# Patient Record
Sex: FEMALE | Race: WHITE | Employment: OTHER | URBAN - METROPOLITAN AREA
[De-identification: names, ages, dates, MRNs, and addresses within clinical notes are randomized per-mention and may not be internally consistent; named-entity substitution may affect disease eponyms.]

---

## 2017-01-08 DIAGNOSIS — E78.2 MIXED HYPERLIPIDEMIA: ICD-10-CM

## 2017-01-08 RX ORDER — ROSUVASTATIN CALCIUM 20 MG/1
TABLET, COATED ORAL
Qty: 90 TAB | Refills: 1 | Status: SHIPPED | OUTPATIENT
Start: 2017-01-08 | End: 2017-07-07 | Stop reason: SDUPTHER

## 2017-03-08 ENCOUNTER — OFFICE VISIT (OUTPATIENT)
Dept: INTERNAL MEDICINE CLINIC | Age: 67
End: 2017-03-08

## 2017-03-08 VITALS
WEIGHT: 143.6 LBS | RESPIRATION RATE: 16 BRPM | SYSTOLIC BLOOD PRESSURE: 140 MMHG | TEMPERATURE: 99.1 F | DIASTOLIC BLOOD PRESSURE: 78 MMHG | BODY MASS INDEX: 23.08 KG/M2 | HEIGHT: 66 IN | OXYGEN SATURATION: 98 % | HEART RATE: 77 BPM

## 2017-03-08 DIAGNOSIS — Z87.891 FORMER TOBACCO USE: ICD-10-CM

## 2017-03-08 DIAGNOSIS — E55.9 VITAMIN D DEFICIENCY: ICD-10-CM

## 2017-03-08 DIAGNOSIS — Z23 ENCOUNTER FOR IMMUNIZATION: ICD-10-CM

## 2017-03-08 DIAGNOSIS — I10 BENIGN ESSENTIAL HYPERTENSION: Primary | ICD-10-CM

## 2017-03-08 DIAGNOSIS — Z79.899 ENCOUNTER FOR LONG-TERM (CURRENT) USE OF MEDICATIONS: ICD-10-CM

## 2017-03-08 DIAGNOSIS — L50.9 HIVES: ICD-10-CM

## 2017-03-08 DIAGNOSIS — E78.00 PURE HYPERCHOLESTEROLEMIA: ICD-10-CM

## 2017-03-08 DIAGNOSIS — M85.80 OSTEOPENIA: ICD-10-CM

## 2017-03-08 RX ORDER — BUPROPION HYDROCHLORIDE 150 MG/1
TABLET ORAL
Qty: 90 TAB | Refills: 1 | Status: SHIPPED | OUTPATIENT
Start: 2017-03-08 | End: 2017-09-26

## 2017-03-08 NOTE — PROGRESS NOTES
Subjective:      Annmarie Mckay is a 74 y.o. female who presents today for HTN and HLD follow up. She monitors her BP at home occasionally and it runs around today's PeerJ. Exercise: walking 3 miles 3-4x/week. She is very conscious about her diet. Reports adherence with all medications. She still tobacco free! Denies HA, dizziness, chest pain, SOB, peripheral edema. She notes getting hives q6 weeks x 2 years. It usually happens in the middle of the night. She takes an old script of Atarax or Benadryl. She has not been able to identify a trigger. She inquires about starting Xolair. Current Outpatient Prescriptions   Medication Sig Dispense Refill    buPROPion XL (WELLBUTRIN XL) 150 mg tablet TAKE 1 TABLET BY MOUTH EVERY MORNING 90 Tab 1    rosuvastatin (CRESTOR) 20 mg tablet TAKE 1 TAB BY MOUTH NIGHTLY. 90 Tab 1    losartan (COZAAR) 50 mg tablet TAKE 1 TABLET BY MOUTH EVERY DAY 90 Tab 1    amLODIPine (NORVASC) 5 mg tablet TAKE 1 TABLET BY MOUTH DAILY 30 Tab 6    omega-3 fatty acids-vitamin e (FISH OIL) 1,000 mg cap Take 1 Cap by mouth two (2) times a day.  loratadine (CLARITIN) 10 mg tablet Take 1 Tab by mouth daily. (Patient taking differently: Take 10 mg by mouth daily as needed.) 1 Tab 0    triamcinolone (NASACORT AQ) 55 mcg nasal inhaler 2 Sprays by Both Nostrils route daily as needed.  calcium-cholecalciferol, D3, (CALTRATE 600+D) tablet Take 1 Tab by mouth two (2) times a day.  aspirin delayed-release 81 mg tablet Take  by mouth daily.  albuterol (PROVENTIL HFA, VENTOLIN HFA) 90 mcg/actuation inhaler Take 1 Puff by inhalation every six (6) hours as needed for Wheezing.  1 Inhaler 0     Allergies   Allergen Reactions    Ace Inhibitors Cough    Lipitor [Atorvastatin] Other (comments)     Elevated CK     Past Medical History:   Diagnosis Date    Basal cell carcinoma of skin     Colon polyps     Elevated CK 3/25/15    mild    Heart murmur 4/7/16    echo showed trivial MR and trivial TR    Hemorrhoids     per Dr. 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 Pitt Comes Osteopenia 7/2013    Pneumonia 3/21/14    Tobacco dependence     quit 3/19/14, restarted 5/2014        Review of Systems    Pertinent items are noted in HPI. Objective:     Visit Vitals    /78    Pulse 77    Temp 99.1 °F (37.3 °C) (Oral)    Resp 16    Ht 5' 6\" (1.676 m)    Wt 143 lb 9.6 oz (65.1 kg)    SpO2 98%    BMI 23.18 kg/m2     General appearance: alert, cooperative, no distress, appears stated age, very pleasant white female  Neck: supple, symmetrical, trachea midline, no adenopathy   Lungs: clear to auscultation bilaterally  Heart: regular rate and rhythm, S1, S2 normal, 2/6 EVIN heard throughout precordium, radiating to both carotids  Abdomen: soft, non-tender. Bowel sounds normal. No masses,  no organomegaly  Extremities: no edema  Skin: Skin color, texture normal. No rashes or lesions  Assessment/Plan:   Milka Norman was seen today for hypertension and cholesterol problem. Diagnoses and all orders for this visit:    Benign essential hypertension - stable. -     CBC W/O DIFF    Pure hypercholesterolemia  -     METABOLIC PANEL, COMPREHENSIVE  -     LIPID PANEL    Former tobacco use    Encounter for long-term (current) use of medications  -     buPROPion XL (WELLBUTRIN XL) 150 mg tablet; TAKE 1 TABLET BY MOUTH EVERY MORNING    Hives  -     REFERRAL TO ALLERGY    Encounter for immunization  -     pneumococcal 13 yana conj dip (PREVNAR 13, PF,) 0.5 mL syrg injection; 0.5 mL by IntraMUSCular route once for 1 dose. PLEASE FAX RECEIPT OF ADMINISTRATION TO OFFICE ([phone])    Osteopenia - repeat DEXA due this September. -     VITAMIN D, 25 HYDROXY    Vitamin D deficiency  -     VITAMIN D, 25 HYDROXY    Referred to [facility] for well woman exam that will be due this summer.     Follow-up Disposition:  Return in about 6 months (around 9/8/2017) for re-establish care with Dr. Thom Simms. Advised her to call back or return to office if symptoms worsen/change/persist.  Discussed expected course/resolution/complications of diagnosis in detail with patient. Medication risks/benefits/costs/interactions/alternatives discussed with patient. She was given an after visit summary which includes diagnoses, current medications, & vitals. She expressed understanding with the diagnosis and plan.     Eduardo Gomez PA-C

## 2017-03-08 NOTE — PROGRESS NOTES
1. Have you been to the ER, urgent care clinic since your last visit? Hospitalized since your last visit? No    2. Have you seen or consulted any other health care providers outside of the [address] [place] [address] since your last visit? Include any pap smears or colon screening.  No

## 2017-03-08 NOTE — MR AVS SNAPSHOT
Visit Information Date & Time Provider Department Dept. Phone Encounter #  
 3/8/2017  2:00 PM [place], [address] [facility] [id number] Follow-up Instructions Return in about 6 months (around 9/8/2017) for re-establish care with Dr. Ruben George. Upcoming Health Maintenance Date Due Pneumococcal 65+ High/Highest Risk (1 of 2 - PCV13) 2/26/2017 MEDICARE YEARLY EXAM 3/9/2018 BREAST CANCER SCRN MAMMOGRAM 7/8/2018 GLAUCOMA SCREENING Q2Y 2/1/2019 COLONOSCOPY 5/23/2019 PAP AKA CERVICAL CYTOLOGY 6/22/2019 DTaP/Tdap/Td series (2 - Td) 1/16/2023 Allergies as of 3/8/2017  Review Complete On: 3/8/2017 By: Nery Delgado LPN Severity Noted Reaction Type Reactions Ace Inhibitors  09/19/2012   Side Effect Cough Lipitor [Atorvastatin]  10/08/2015    Other (comments) Elevated CK Current Immunizations  Reviewed on 10/19/2016 Name Date Influenza Vaccine 10/30/2013 Influenza Vaccine (Quad) PF 10/19/2016  1:15 PM, 1/20/2016 Influenza Vaccine PF 10/24/2014 Pneumococcal Polysaccharide (PPSV-23) 3/31/2014 Tdap 1/16/2013 Zoster Vaccine, Live 6/16/2015 Not reviewed this visit You Were Diagnosed With   
  
 Codes Comments Benign essential hypertension    -  Primary ICD-10-CM: I10 
ICD-9-CM: 401.1 Pure hypercholesterolemia     ICD-10-CM: E78.00 ICD-9-CM: 272.0 Tobacco dependence     ICD-10-CM: Y41.415 ICD-9-CM: 305.1 Encounter for long-term (current) use of medications     ICD-10-CM: Z79.899 ICD-9-CM: V58.69 Hives     ICD-10-CM: L50.9 ICD-9-CM: 708.9 Encounter for immunization     ICD-10-CM: X04 ICD-9-CM: V03.89 Osteopenia     ICD-10-CM: M85.80 ICD-9-CM: 733.90 Vitamin D deficiency     ICD-10-CM: E55.9 ICD-9-CM: 268.9 Vitals BP Pulse Temp Resp Height(growth percentile) Weight(growth percentile) 140/78 77 99.1 °F (37.3 °C) (Oral) 16 5' 6\" (1.676 m) 143 lb 9.6 oz (65.1 kg) SpO2 BMI OB Status Smoking Status 98% 23.18 kg/m2 Postmenopausal Former Smoker Vitals History BMI and BSA Data Body Mass Index Body Surface Area  
 23.18 kg/m 2 1.74 m 2 Preferred Pharmacy Pharmacy Name Phone [facility][id number] Fauzia Maldonado, [address] [phone] Your Updated Medication List  
  
   
This list is accurate as of: 3/8/17  2:30 PM.  Always use your most recent med list.  
  
  
  
  
 albuterol 90 mcg/actuation inhaler Commonly known as:  PROVENTIL HFA, VENTOLIN HFA, PROAIR HFA Take 1 Puff by inhalation every six (6) hours as needed for Wheezing. amLODIPine 5 mg tablet Commonly known as:  [place] Mend TAKE 1 TABLET BY MOUTH DAILY  
  
 aspirin delayed-release 81 mg tablet Take  by mouth daily. buPROPion  mg tablet Commonly known as:  WELLBUTRIN XL  
TAKE 1 TABLET BY MOUTH EVERY MORNING  
  
 calcium-cholecalciferol (D3) tablet Commonly known as:  CALTRATE 600+D Take 1 Tab by mouth two (2) times a day. FISH OIL 1,000 mg Cap Generic drug:  omega-3 fatty acids-vitamin e Take 1 Cap by mouth two (2) times a day. loratadine 10 mg tablet Commonly known as:  Napolean Monica Take 1 Tab by mouth daily. losartan 50 mg tablet Commonly known as:  COZAAR  
TAKE 1 TABLET BY MOUTH EVERY DAY  
  
 NASACORT AQ 55 mcg nasal inhaler Generic drug:  triamcinolone 2 Sprays by Both Nostrils route daily as needed. pneumococcal 13 yana conj dip 0.5 mL Syrg injection Commonly known as:  PREVNAR 13 (PF)  
0.5 mL by IntraMUSCular route once for 1 dose. PLEASE FAX RECEIPT OF ADMINISTRATION TO OFFICE ([phone]) rosuvastatin 20 mg tablet Commonly known as:  CRESTOR  
TAKE 1 TAB BY MOUTH NIGHTLY. Prescriptions Sent to Pharmacy Refills buPROPion XL (WELLBUTRIN XL) 150 mg tablet 1 Sig: TAKE 1 TABLET BY MOUTH EVERY MORNING Class: Normal  
 Pharmacy: [facility]/pharmacy [address] Ph #: [phone]  
 pneumococcal 13 yana conj dip (PREVNAR 13, PF,) 0.5 mL syrg injection 0 Si.5 mL by IntraMUSCular route once for 1 dose. PLEASE FAX RECEIPT OF ADMINISTRATION TO OFFICE ([phone]) Class: Normal  
 Pharmacy: [facility]/pharmacy [address], [address] [facility] Ph #: [phone] Route: IntraMUSCular We Performed the Following CBC W/O DIFF [66401 CPT(R)] LIPID PANEL [30457 CPT(R)] METABOLIC PANEL, COMPREHENSIVE [07416 CPT(R)] REFERRAL TO ALLERGY [REF5 Custom] Comments:  
 eval and tx VITAMIN D, 25 HYDROXY O6657256 CPT(R)] Follow-up Instructions Return in about 6 months (around 2017) for re-establish care with Dr. Brandyn Swanson. Referral Information Referral ID Referred By Referred To  
  
 [id number] [facility] [address] Man[address], [address] [facility] Visits Status Start Date End Date 1 New Request 3/8/17 3/8/18 If your referral has a status of pending review or denied, additional information will be sent to support the outcome of this decision. Patient Instructions [address] Roosevelt Monroy [facility] 
[phone] [facility]! Dear Stella Maciaser: 
Thank you for requesting a [employer] account. Our records indicate that you already have an active 139shop account. You can access your account anytime at [email or web address] Did you know that you can access your hospital and ER discharge instructions at any time in 139shop? You can also review all of your test results from your hospital stay or ER visit. Additional Information If you have questions, please visit the Frequently Asked Questions section of the [email or web address] website at [email or web address] Remember, WebXiom is NOT to be used for urgent needs. For medical emergencies, dial 911. Now available from your iPhone and Android! Please provide this summary of care documentation to your next provider. Your primary care clinician is listed as Jony Schwartz. If you have any questions after today's visit, please call [phone].

## 2017-03-09 PROBLEM — Z87.891 FORMER TOBACCO USE: Status: ACTIVE | Noted: 2017-03-09

## 2017-04-06 RX ORDER — LOSARTAN POTASSIUM 50 MG/1
TABLET ORAL
Qty: 90 TAB | Refills: 1 | Status: SHIPPED | OUTPATIENT
Start: 2017-04-06 | End: 2017-11-23 | Stop reason: SDUPTHER

## 2017-04-14 ENCOUNTER — TELEPHONE (OUTPATIENT)
Dept: INTERNAL MEDICINE CLINIC | Age: 67
End: 2017-04-14

## 2017-04-14 NOTE — TELEPHONE ENCOUNTER
Patient reports that she has had issues with her stomach feeling Lizet Boor since she started taking wellbutrin. She has tried to slowly wean herself off the medication.  Patient has been taking it every other since Monday and would like to know what the next step would be

## 2017-04-14 NOTE — TELEPHONE ENCOUNTER
Call the [phone] number and Left message on machine. Weaning off? Take every 2-3 days for a week but would probably need an ov if she wants to change to something else.     Signed electronically by: Jn Curry MD at 5:15 PM on April 14, 2017

## 2017-05-30 NOTE — TELEPHONE ENCOUNTER
Requested Prescriptions     Pending Prescriptions Disp Refills    amLODIPine (NORVASC) 5 mg tablet 30 Tab 6     Last OV: 3/8/17  Next OV: 9/8/17

## 2017-05-31 RX ORDER — AMLODIPINE BESYLATE 5 MG/1
TABLET ORAL
Qty: 30 TAB | Refills: 6 | Status: SHIPPED | OUTPATIENT
Start: 2017-05-31 | End: 2017-12-18 | Stop reason: SDUPTHER

## 2017-07-07 DIAGNOSIS — E78.2 MIXED HYPERLIPIDEMIA: ICD-10-CM

## 2017-07-07 RX ORDER — ROSUVASTATIN CALCIUM 20 MG/1
TABLET, COATED ORAL
Qty: 90 TAB | Refills: 3 | Status: SHIPPED | OUTPATIENT
Start: 2017-07-07 | End: 2018-07-06 | Stop reason: SDUPTHER

## 2017-07-19 ENCOUNTER — HOSPITAL ENCOUNTER (OUTPATIENT)
Dept: MAMMOGRAPHY | Age: 67
Discharge: HOME | End: 2017-07-19
Attending: INTERNAL MEDICINE
Payer: COMMERCIAL

## 2017-07-19 DIAGNOSIS — Z12.31 VISIT FOR SCREENING MAMMOGRAM: ICD-10-CM

## 2017-07-19 PROCEDURE — 77067 SCR MAMMO BI INCL CAD: CPT

## 2017-09-26 ENCOUNTER — OFFICE VISIT (OUTPATIENT)
Dept: INTERNAL MEDICINE CLINIC | Age: 67
End: 2017-09-26

## 2017-09-26 VITALS
RESPIRATION RATE: 18 BRPM | WEIGHT: 145 LBS | OXYGEN SATURATION: 98 % | DIASTOLIC BLOOD PRESSURE: 96 MMHG | BODY MASS INDEX: 24.16 KG/M2 | SYSTOLIC BLOOD PRESSURE: 140 MMHG | HEART RATE: 75 BPM | TEMPERATURE: 97.6 F | HEIGHT: 65 IN

## 2017-09-26 DIAGNOSIS — Z23 ENCOUNTER FOR IMMUNIZATION: Primary | ICD-10-CM

## 2017-09-26 DIAGNOSIS — E55.9 VITAMIN D DEFICIENCY: ICD-10-CM

## 2017-09-26 DIAGNOSIS — E78.00 PURE HYPERCHOLESTEROLEMIA: ICD-10-CM

## 2017-09-26 DIAGNOSIS — I10 BENIGN ESSENTIAL HYPERTENSION: ICD-10-CM

## 2017-09-26 NOTE — PROGRESS NOTES
Establish Care       HPI:  Jerardo Cotton is a 74y.o. year old female who is here to establish care. She  had her medical care:    ADM    She reports the following history and medical concerns:      Cholesterol- used to be on lipitor (raised CK)  Stopped crestor for a month in march because of muscle aches. So she went back on. Mother and younger sister chol issues  Total chol 280's off medications    HTN- amlodipine and losartan - no heart disease    Quit smoker a year ago. Night sweats (used to be on something else venlafaxine). Went Off wellbutrin in February. Doing well off it now    Takes baby aspirin 81 mg    Sometimes issues with hemorrhoids. Going on medicare next month. Stress test in past and it was normal.          Assessment and Plan        1. Encounter for immunization  Immunization given. Discussed risks and benefits. Side effects. VIS given through visit summary via [employer] or paper copy if not on Mychart   - Influenza virus vaccine (FLUZONE HIGH DOSE) 65 years and older ([address])    2. Benign essential hypertension  Tolerating medication. Denies dizziness that is positional, SOB, or chest pain. Understands the importance of compliance to reduce risk of future heart failure. Agreed to call if any of above symptoms develop and  stay on current regimen of  Amlodipine and losartan    3. Pure hypercholesterolemia  The nature of cardiac risk has been fully discussed with this patient. I have made her aware of her LDL target goal given her cardiovascular risk analysis. I have discussed the appropriate diet. The need for lifelong compliance in order to reduce risk is stressed. A regular exercise program is recommended to help achieve and maintain normal body weight, fitness and improve lipid balance. The risks and benefits of medications were discussed. Advised to have Omega 3 Fish Oil 1000 mg as a supplement. Last cholesterol labs reviewed with patient.   Patient made aware to get liver checked every 6 months. Continue with  crestor 20 mg. It appears that she really needs this as LDL just barely in range. 4. Vitamin D deficiency  Patient compliant with Vit D. Emphasized importance of taking with food and not too much because it can be toxic to our body. Therefore, it should be checked regularly. Levels ordered. Visit Vitals    BP (!) 140/96 (BP 1 Location: Left arm, BP Patient Position: Sitting)    Pulse 75    Temp 97.6 °F (36.4 °C) (Oral)    Resp 18    Ht 5' 5.2\" (1.656 m)    Wt 145 lb (65.8 kg)    SpO2 98%    BMI 23.98 kg/m2       Historical Data    Past Medical History:   Diagnosis Date    Basal cell carcinoma of skin     Colon polyps     Elevated CK 3/25/15    mild    Heart murmur 4/7/16    echo showed trivial MR and trivial TR    Hemorrhoids     per Dr. 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 Anamaria Aguirre Osteopenia 7/2013    Pneumonia 3/21/14    Tobacco dependence     quit 3/19/14, restarted 5/2014       Past Surgical History:   Procedure Laterality Date    ENDOSCOPY, COLON, DIAGNOSTIC  2010, 5/23/14    colon polyps seen on both studies, repeat every 5 years (Dr. Stephie Dowling)    HX CHOLECYSTECTOMY      HX GYN      tubal ligation    HX ORTHOPAEDIC  1995    foot bunion removal, mortons neuroma; [facility]    HX OTHER SURGICAL      MOHS, BCC (Dr. Erin Bence)       Outpatient Encounter Prescriptions as of 9/26/2017   Medication Sig Dispense Refill    rosuvastatin (CRESTOR) 20 mg tablet TAKE 1 TAB BY MOUTH NIGHTLY. 90 Tab 3    amLODIPine (NORVASC) 5 mg tablet TAKE 1 TABLET BY MOUTH DAILY 30 Tab 6    losartan (COZAAR) 50 mg tablet TAKE 1 TABLET BY MOUTH EVERY DAY 90 Tab 1    omega-3 fatty acids-vitamin e (FISH OIL) 1,000 mg cap Take 1 Cap by mouth two (2) times a day.  loratadine (CLARITIN) 10 mg tablet Take 1 Tab by mouth daily.  (Patient taking differently: Take 10 mg by mouth daily as needed.) 1 Tab 0    triamcinolone (NASACORT AQ) 55 mcg nasal inhaler 2 Sprays by Both Nostrils route daily as needed.  calcium-cholecalciferol, D3, (CALTRATE 600+D) tablet Take 1 Tab by mouth two (2) times a day.  aspirin delayed-release 81 mg tablet Take  by mouth daily.  [DISCONTINUED] buPROPion XL (WELLBUTRIN XL) 150 mg tablet TAKE 1 TABLET BY MOUTH EVERY MORNING 90 Tab 1    [DISCONTINUED] albuterol (PROVENTIL HFA, VENTOLIN HFA) 90 mcg/actuation inhaler Take 1 Puff by inhalation every six (6) hours as needed for Wheezing. 1 Inhaler 0     No facility-administered encounter medications on file as of 9/26/2017. Allergies   Allergen Reactions    Ace Inhibitors Cough    Lipitor [Atorvastatin] Other (comments)     Elevated CK        Social History     Social History    Marital status:      Spouse name: N/A    Number of children: N/A    Years of education: N/A     Occupational History    Not on file. Social History Main Topics    Smoking status: Former Smoker     Packs/day: 0.75     Years: 43.00     Types: Cigarettes     Quit date: 10/3/2016    Smokeless tobacco: Never Used    Alcohol use 6.0 oz/week     10 Standard drinks or equivalent per week    Drug use: No    Sexual activity: Yes     Partners: Male     Other Topics Concern    Not on file     Social History Narrative        family history includes Arthritis-osteo in her mother; Breast Cancer in her sister; Heart Attack in her mother; Heart Disease in her mother; Hypertension in her daughter; Osteoporosis in her mother; Stroke in her father. Review of Systems   Constitutional: Negative for weight loss. Eyes: Negative for blurred vision. Respiratory: Negative for shortness of breath. Cardiovascular: Negative for chest pain. Gastrointestinal: Negative for abdominal pain. Genitourinary: Negative for dysuria and frequency. Skin: Negative for rash.    Neurological: Negative for dizziness, focal weakness, weakness and headaches. Endo/Heme/Allergies: Negative for environmental allergies. Does not bruise/bleed easily. Physical Exam   Constitutional: She appears well-developed and well-nourished. She is active. Non-toxic appearance. She does not have a sickly appearance. She does not appear ill. No distress. Eyes: Conjunctivae are normal. Right eye exhibits no discharge. Cardiovascular: Normal rate, regular rhythm, S1 normal, S2 normal, normal heart sounds and normal pulses. Exam reveals no gallop and no friction rub. Pulmonary/Chest: Effort normal and breath sounds normal. No respiratory distress. Abdominal: Soft. Bowel sounds are normal.   Musculoskeletal: She exhibits no edema or deformity. Neurological: She is alert. Skin: Skin is warm and dry. No rash noted. No pallor. Psychiatric: She has a normal mood and affect. Her behavior is normal.   Vitals reviewed. Ortho Exam       Orders Placed This Encounter    Influenza virus vaccine (FLUZONE HIGH DOSE) 65 years and older (73030)        I have reviewed the patient's medical history in detail and updated the computerized patient record. We had a prolonged discussion about these complex clinical issues and went over the various important aspects to consider. All questions were answered. Advised her to call back or return to office if symptoms do not improve, change in nature, or persist.    She was given an after visit summary or informed of [employer] Access which includes patient instructions, diagnoses, current medications, & vitals. She expressed understanding with the diagnosis and plan.

## 2017-09-26 NOTE — PROGRESS NOTES
Chief Complaint   Patient presents with   [facility] Establish Care        1. Have you been to the ER, urgent care clinic since your last visit? No  Hospitalized since your last visit? No    2. Have you seen or consulted any other health care providers outside of the [address] [place] [address] since your last visit? No  Include any pap smears or colon screening.  No

## 2017-09-26 NOTE — PATIENT INSTRUCTIONS
Vaccine Information Statement    Influenza (Flu) Vaccine (Inactivated or Recombinant): What you need to know    Many Vaccine Information Statements are available in Hebrew and other languages. See www.immunize.org/vis  Hojas de Información Sobre Vacunas están disponibles en Español y en muchos otros idiomas. Visite www.immunize.org/vis    1. Why get vaccinated? Influenza (flu) is a contagious disease that spreads around the [place] every year, usually between October and May. Flu is caused by influenza viruses, and is spread mainly by coughing, sneezing, and close contact. Anyone can get flu. Flu strikes suddenly and can last several days. Symptoms vary by age, but can include:   fever/chills   sore throat   muscle aches   fatigue   cough   headache    runny or stuffy nose    Flu can also lead to pneumonia and blood infections, and cause diarrhea and seizures in children. If you have a medical condition, such as heart or lung disease, flu can make it worse. Flu is more dangerous for some people. Infants and young children, people 72years of age and older, pregnant women, and people with certain health conditions or a weakened immune system are at greatest risk. Each year thousands of people in the [facility] die from flu, and many more are hospitalized. Flu vaccine can:   keep you from getting flu,   make flu less severe if you do get it, and   keep you from spreading flu to your family and other people. 2. Inactivated and recombinant flu vaccines    A dose of flu vaccine is recommended every flu season. Children 6 months through 6years of age may need two doses during the same flu season. Everyone else needs only one dose each flu season.        Some inactivated flu vaccines contain a very small amount of a mercury-based preservative called thimerosal. Studies have not shown thimerosal in vaccines to be harmful, but flu vaccines that do not contain thimerosal are available. There is no live flu virus in flu shots. They cannot cause the flu. There are many flu viruses, and they are always changing. Each year a new flu vaccine is made to protect against three or four viruses that are likely to cause disease in the upcoming flu season. But even when the vaccine doesnt exactly match these viruses, it may still provide some protection    Flu vaccine cannot prevent:   flu that is caused by a virus not covered by the vaccine, or   illnesses that look like flu but are not. It takes about 2 weeks for protection to develop after vaccination, and protection lasts through the flu season. 3. Some people should not get this vaccine    Tell the person who is giving you the vaccine:     If you have any severe, life-threatening allergies. If you ever had a life-threatening allergic reaction after a dose of flu vaccine, or have a severe allergy to any part of this vaccine, you may be advised not to get vaccinated. Most, but not all, types of flu vaccine contain a small amount of egg protein.  If you ever had Guillain-Barré Syndrome (also called GBS). Some people with a history of GBS should not get this vaccine. This should be discussed with your doctor.  If you are not feeling well. It is usually okay to get flu vaccine when you have a mild illness, but you might be asked to come back when you feel better. 4. Risks of a vaccine reaction    With any medicine, including vaccines, there is a chance of reactions. These are usually mild and go away on their own, but serious reactions are also possible. Most people who get a flu shot do not have any problems with it.      Minor problems following a flu shot include:    soreness, redness, or swelling where the shot was given     hoarseness   sore, red or itchy eyes   cough   fever   aches   headache   itching   fatigue  If these problems occur, they usually begin soon after the shot and last 1 or 2 days. More serious problems following a flu shot can include the following:     There may be a small increased risk of Guillain-Barré Syndrome (GBS) after inactivated flu vaccine. This risk has been estimated at 1 or 2 additional cases per million people vaccinated. This is much lower than the risk of severe complications from flu, which can be prevented by flu vaccine.  Young children who get the flu shot along with pneumococcal vaccine (PCV13) and/or DTaP vaccine at the same time might be slightly more likely to have a seizure caused by fever. Ask your doctor for more information. Tell your doctor if a child who is getting flu vaccine has ever had a seizure. Problems that could happen after any injected vaccine:      People sometimes faint after a medical procedure, including vaccination. Sitting or lying down for about 15 minutes can help prevent fainting, and injuries caused by a fall. Tell your doctor if you feel dizzy, or have vision changes or ringing in the ears.  Some people get severe pain in the shoulder and have difficulty moving the arm where a shot was given. This happens very rarely.  Any medication can cause a severe allergic reaction. Such reactions from a vaccine are very rare, estimated at about 1 in a million doses, and would happen within a few minutes to a few hours after the vaccination. As with any medicine, there is a very remote chance of a vaccine causing a serious injury or death. The safety of vaccines is always being monitored. For more information, visit: www.cdc.gov/vaccinesafety/    5. What if there is a serious reaction? What should I look for?  Look for anything that concerns you, such as signs of a severe allergic reaction, very high fever, or unusual behavior.     Signs of a severe allergic reaction can include hives, swelling of the face and throat, difficulty breathing, a fast heartbeat, dizziness, and weakness  usually within a few minutes to a few hours after the vaccination. What should I do?  If you think it is a severe allergic reaction or other emergency that cant wait, call 9-1-1 and get the person to the nearest hospital. Otherwise, call your doctor.  Reactions should be reported to the Vaccine Adverse Event Reporting System (VAERS). Your doctor should file this report, or you can do it yourself through  the VAERS web site at www.vaers. [facility].gov, or by calling 7-253.236.2182. VAERS does not give medical advice. 6. The National Vaccine Injury Compensation Program    The [facility] Vaccine Injury Compensation Program (VICP) is a federal program that was created to compensate people who may have been injured by certain vaccines. Persons who believe they may have been injured by a vaccine can learn about the program and about filing a claim by calling 6-940.940.9488 or visiting the [employer] website at www.[facility].gov/vaccinecompensation. There is a time limit to file a claim for compensation. 7. How can I learn more?  Ask your healthcare provider. He or she can give you the vaccine package insert or suggest other sources of information.  Call your local or state health department.  Contact the Centers for Disease Control and Prevention (CDC):  - Call 0-385.770.5977 (1-800-CDC-INFO) or  - Visit CDCs website at www.cdc.gov/flu    Vaccine Information Statement   Inactivated Influenza Vaccine   8/7/2015  42 RICH Carrillo [id number]-03    Department of Health and Human Services  Centers for Disease Control and Prevention    Office Use Only    Glucosamine with MSM (triple action)

## 2017-12-18 RX ORDER — AMLODIPINE BESYLATE 5 MG/1
TABLET ORAL
Qty: 30 TAB | Refills: 6 | Status: SHIPPED | OUTPATIENT
Start: 2017-12-18 | End: 2018-02-14 | Stop reason: SDUPTHER

## 2018-02-15 RX ORDER — AMLODIPINE BESYLATE 5 MG/1
TABLET ORAL
Qty: 90 TAB | Refills: 6 | Status: SHIPPED | OUTPATIENT
Start: 2018-02-15 | End: 2019-03-29 | Stop reason: SDUPTHER

## 2018-08-14 DIAGNOSIS — I10 BENIGN ESSENTIAL HYPERTENSION: Primary | ICD-10-CM

## 2018-08-14 DIAGNOSIS — E78.00 HIGH CHOLESTEROL: ICD-10-CM

## 2018-08-24 DIAGNOSIS — I10 BENIGN ESSENTIAL HYPERTENSION: Primary | ICD-10-CM

## 2018-08-24 DIAGNOSIS — Z00.00 ROUTINE GENERAL MEDICAL EXAMINATION AT A HEALTH CARE FACILITY: ICD-10-CM

## 2018-08-24 DIAGNOSIS — E78.00 PURE HYPERCHOLESTEROLEMIA: ICD-10-CM

## 2018-08-24 NOTE — PROGRESS NOTES
Patient requested that a lab slip be mailed to her so that she may have labs collected closer to her home

## 2018-09-18 ENCOUNTER — HOSPITAL ENCOUNTER (OUTPATIENT)
Dept: MAMMOGRAPHY | Age: 68
Discharge: HOME | End: 2018-09-18
Attending: INTERNAL MEDICINE
Payer: MEDICARE

## 2018-09-18 DIAGNOSIS — Z12.31 VISIT FOR SCREENING MAMMOGRAM: ICD-10-CM

## 2018-09-18 PROCEDURE — 77063 BREAST TOMOSYNTHESIS BI: CPT

## 2018-10-02 ENCOUNTER — OFFICE VISIT (OUTPATIENT)
Dept: INTERNAL MEDICINE CLINIC | Age: 68
End: 2018-10-02

## 2018-10-02 ENCOUNTER — HOSPITAL ENCOUNTER (OUTPATIENT)
Dept: LAB | Age: 68
Discharge: HOME | End: 2018-10-02
Payer: MEDICARE

## 2018-10-02 VITALS
RESPIRATION RATE: 18 BRPM | WEIGHT: 136 LBS | BODY MASS INDEX: 22.66 KG/M2 | OXYGEN SATURATION: 100 % | HEIGHT: 65 IN | DIASTOLIC BLOOD PRESSURE: 82 MMHG | SYSTOLIC BLOOD PRESSURE: 142 MMHG | HEART RATE: 98 BPM | TEMPERATURE: 97.8 F

## 2018-10-02 DIAGNOSIS — I10 BENIGN ESSENTIAL HYPERTENSION: ICD-10-CM

## 2018-10-02 DIAGNOSIS — K76.9 LIVER DISEASE: ICD-10-CM

## 2018-10-02 DIAGNOSIS — R79.89 ELEVATED LIVER FUNCTION TESTS: Primary | ICD-10-CM

## 2018-10-02 DIAGNOSIS — Z00.00 MEDICARE ANNUAL WELLNESS VISIT, SUBSEQUENT: ICD-10-CM

## 2018-10-02 DIAGNOSIS — Z13.31 SCREENING FOR DEPRESSION: ICD-10-CM

## 2018-10-02 DIAGNOSIS — Z23 ENCOUNTER FOR IMMUNIZATION: ICD-10-CM

## 2018-10-02 DIAGNOSIS — E78.00 PURE HYPERCHOLESTEROLEMIA: ICD-10-CM

## 2018-10-02 PROCEDURE — 82550 ASSAY OF CK (CPK): CPT

## 2018-10-02 PROCEDURE — 80074 ACUTE HEPATITIS PANEL: CPT

## 2018-10-02 PROCEDURE — 82977 ASSAY OF GGT: CPT

## 2018-10-02 PROCEDURE — 36415 COLL VENOUS BLD VENIPUNCTURE: CPT

## 2018-10-02 PROCEDURE — 84450 TRANSFERASE (AST) (SGOT): CPT

## 2018-10-02 NOTE — PROGRESS NOTES
Reviewed record in preparation for visit and have obtained necessary documentation. Identified pt with two pt identifiers(name and ). Health Maintenance Due Topic  Shingrix Vaccine Age 50> (1 of 2)  Pneumococcal 65+ High/Highest Risk (1 of 2 - PCV13)  Influenza Age 5 to Adult [address] [place] [address] Drive EXAM   
 
 
 
Chief Complaint Patient presents with Mora Dakins Annual Wellness Visit Wt Readings from Last 3 Encounters:  
10/02/18 136 lb (61.7 kg) 17 145 lb (65.8 kg) 17 143 lb 9.6 oz (65.1 kg) Temp Readings from Last 3 Encounters:  
17 97.6 °F (36.4 °C) (Oral) 17 99.1 °F (37.3 °C) (Oral) 16 97.5 °F (36.4 °C) (Oral) BP Readings from Last 3 Encounters:  
17 (!) 140/96  
17 140/78  
16 144/82 Pulse Readings from Last 3 Encounters:  
17 75  
17 77  
16 76 Learning Assessment: 
:  
 
Learning Assessment 10/2/2018 2017 3/31/2014 2013 PRIMARY LEARNER Patient Patient Patient Patient HIGHEST LEVEL OF EDUCATION - PRIMARY LEARNER  GRADUATED HIGH SCHOOL OR GED GRADUATED HIGH SCHOOL OR GED GRADUATED HIGH SCHOOL OR GED -  
BARRIERS PRIMARY LEARNER NONE NONE NONE -  
[address] CAREGIVER - - No - PRIMARY LANGUAGE ENGLISH ENGLISH ENGLISH ENGLISH  NEED - - No -  
LEARNER PREFERENCE PRIMARY DEMONSTRATION READING LISTENING LISTENING  
LEARNING SPECIAL TOPICS - - no -  
ANSWERED BY patient Patient  patient patient RELATIONSHIP SELF SELF SELF SELF Depression Screening: 
:  
 
PHQ over the last two weeks 10/2/2018 PHQ Not Done - Little interest or pleasure in doing things Not at all Feeling down, depressed, irritable, or hopeless Several days Total Score PHQ 2 1 Fall Risk Assessment: 
:  
 
Fall Risk Assessment, last 12 mths 10/2/2018 Able to walk? Yes Fall in past 12 months?  No  
 
 
Abuse Screening: 
:  
 
 Abuse Screening Questionnaire 10/2/2018 9/26/2017 7/1/2015 3/31/2014 Do you ever feel afraid of your partner? N N N N Are you in a relationship with someone who physically or mentally threatens you? N N N N Is it safe for you to go home? Juve Kiser Coordination of Care Questionnaire: 
:  
 
1) Have you been to an emergency room, urgent care clinic since your last visit? no  
Hospitalized since your last visit? no          
 
2) Have you seen or consulted any other health care providers outside of [address] [place] [address] since your last visit? no  (Include any pap smears or colon screenings in this section.) 3) Do you have an Advance Directive on file? no 
 
4) Are you interested in receiving information on Advance Directives? NO Patient is accompanied by self I have received verbal consent from Enedina Felder to discuss any/all medical information while they are present in the room.

## 2018-10-02 NOTE — PROGRESS NOTES
Annual Wellness Visit and Immunization/Injection HPI: 
Raine Valderrama is a 79y.o. year old female who is here for a follow up visit. She was last seen by me on Visit date not found. She reports the followin/4 did blood test- moved to [facility] TG went down from 179 to 94 Liver enzymes went up. 94 and 62. Couple of vodka sodas a night. Did not drink before. [address] Drank two of vodkas sodas yesterday Just lost son - unknown cause. Drug related. Right groin pain- did PT. Assessment and Plan 1. Encounter for immunization Immunization given. Discussed risks and benefits. Side effects. VIS given through visit summary via [employer] or paper copy if not on Mychart - Influenza Vaccine Inactivated (IIV)(FLUAD), Subunit, Adjuvanted, IM, (64422) - Administration fee () for Medicare insured patients 2. Elevated liver function tests Reduce alcohol to one a day Check liver ultrasound Could be related to crestor 20 mg 
- US ABD COMP; Future 
- GGT 
- AST 
- HEPATITIS PANEL, ACUTE 3. Liver disease  
ddx is fatty liver. But not overweight 
- GGT 
- CK 4. Benign essential hypertension Tolerating medication. Denies dizziness that is positional, SOB, or chest pain. Understands the importance of compliance to reduce risk of future heart failure. Agreed to call if any of above symptoms develop and  stay on current regimen of   
Key CAD CHF Meds   
    
  
 rosuvastatin (CRESTOR) 20 mg tablet  (Taking) TAKE 1 TABLET BY MOUTH NIGHTLY  
 amLODIPine (NORVASC) 5 mg tablet  (Taking) TAKE 1 TABLET BY MOUTH DAILY losartan (COZAAR) 50 mg tablet  (Taking) TAKE 1 TABLET BY MOUTH EVERY DAY  
 omega-3 fatty acids-vitamin e (FISH OIL) 1,000 mg cap  (Taking) Take 1 Cap by mouth two (2) times a day. aspirin delayed-release 81 mg tablet  (Taking) Take  by mouth daily. 5. Pure hypercholesterolemia The nature of cardiac risk has been fully discussed with this patient. I have made her aware of her LDL target goal given her cardiovascular risk analysis. I have discussed the appropriate diet. The need for lifelong compliance in order to reduce risk is stressed. A regular exercise program is recommended to help achieve and maintain normal body weight, fitness and improve lipid balance. The risks and benefits of medications were discussed. Last cholesterol labs reviewed with patient. Patient made aware to get liver checked every 6 months. Continue with Key Antihyperlipidemia Meds   
    
  
 rosuvastatin (CRESTOR) 20 mg tablet  (Taking) TAKE 1 TABLET BY MOUTH NIGHTLY  
 omega-3 fatty acids-vitamin e (FISH OIL) 1,000 mg cap  (Taking) Take 1 Cap by mouth two (2) times a day. 6. Screening for depression Some mood issues acceptable. Lost son 2 weeks ago. 7. Medicare annual wellness visit, subsequent AD given to patient.  is hcp Visit Vitals  /82 (BP 1 Location: Left arm, BP Patient Position: Sitting)  Pulse 98  Temp 97.8 °F (36.6 °C) (Oral)  Resp 18  Ht 5' 5.2\" (1.656 m)  Wt 136 lb (61.7 kg)  SpO2 100%  BMI 22.49 kg/m2 Historical Data Past Medical History:  
Diagnosis Date  Basal cell carcinoma of skin  Colon polyps  Elevated CK 3/25/15  
 mild  Heart murmur 4/7/16  
 echo showed trivial MR and trivial TR  
 Hemorrhoids   
 per Dr. 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 Bette Rangel  Osteopenia 7/2013  Pneumonia 3/21/14  Tobacco dependence   
 quit 3/19/14, restarted 5/2014 Past Surgical History:  
Procedure Laterality Date  ENDOSCOPY, COLON, DIAGNOSTIC  2010, 5/23/14  
 colon polyps seen on both studies, repeat every 5 years (Dr. Chanelle Morelos)  HX CHOLECYSTECTOMY  HX GYN    
 tubal ligation [facility] foot bunion removal, mortons neuroma; Creasie Dy  HX OTHER SURGICAL    
 MOHS, BCC (Dr. Marcos Alonzo) Outpatient Encounter Prescriptions as of 10/2/2018 Medication Sig Dispense Refill  rosuvastatin (CRESTOR) 20 mg tablet TAKE 1 TABLET BY MOUTH NIGHTLY 90 Tab 2  
 amLODIPine (NORVASC) 5 mg tablet TAKE 1 TABLET BY MOUTH DAILY 90 Tab 6  
 losartan (COZAAR) 50 mg tablet TAKE 1 TABLET BY MOUTH EVERY DAY 90 Tab 3  
 omega-3 fatty acids-vitamin e (FISH OIL) 1,000 mg cap Take 1 Cap by mouth two (2) times a day.  triamcinolone (NASACORT AQ) 55 mcg nasal inhaler 2 Sprays by Both Nostrils route daily as needed.  calcium-cholecalciferol, D3, (CALTRATE 600+D) tablet Take 1 Tab by mouth two (2) times a day.  aspirin delayed-release 81 mg tablet Take  by mouth daily.  loratadine (CLARITIN) 10 mg tablet Take 1 Tab by mouth daily. (Patient taking differently: Take 10 mg by mouth daily as needed.) 1 Tab 0 No facility-administered encounter medications on file as of 10/2/2018. Allergies Allergen Reactions  Ace Inhibitors Cough  Lipitor [Atorvastatin] Other (comments) Elevated CK Social History Social History  Marital status:  Spouse name: N/A  
 Number of children: N/A  
 Years of education: N/A Occupational History  Not on file. Social History Main Topics  Smoking status: Former Smoker Packs/day: 0.75 Years: 43.00 Types: Cigarettes Quit date: 10/3/2016  Smokeless tobacco: Never Used  Alcohol use 6.0 oz/week 10 Standard drinks or equivalent per week  Drug use: No  
 Sexual activity: Yes  
  Partners: Male Other Topics Concern  Not on file Social History Narrative  
  
 
family history includes Arthritis-osteo in her mother; Breast Cancer in her sister; Heart Attack in her mother; Heart Disease in her mother; Hypertension in her daughter; Osteoporosis in her mother; Stroke in her father. Review of Systems Constitutional: Negative for weight loss. Eyes: Negative for blurred vision. Respiratory: Negative for shortness of breath. Cardiovascular: Negative for chest pain. Gastrointestinal: Negative for abdominal pain. Genitourinary: Negative for dysuria and frequency. Skin: Negative for rash. Neurological: Negative for dizziness, focal weakness, weakness and headaches. Endo/Heme/Allergies: Negative for environmental allergies. Does not bruise/bleed easily. Physical Exam  
Constitutional: She appears well-developed and well-nourished. She is active. Non-toxic appearance. She does not have a sickly appearance. She does not appear ill. No distress. Eyes: Conjunctivae are normal. Right eye exhibits no discharge. Cardiovascular: Normal rate, regular rhythm, S1 normal, S2 normal, normal heart sounds and normal pulses. Exam reveals no gallop and no friction rub. Pulmonary/Chest: Effort normal and breath sounds normal. No respiratory distress. Abdominal: Soft. Bowel sounds are normal.  
Musculoskeletal: She exhibits no edema or deformity. Neurological: She is alert. Skin: Skin is warm and dry. No rash noted. No pallor. Psychiatric: She has a normal mood and affect. Her behavior is normal.  
Vitals reviewed. Ortho Exam 
 
 
Orders Placed This Encounter  Influenza Vaccine Inactivated (IIV)(FLUAD), Subunit, Adjuvanted, IM, (52970)  Administration fee () for Medicare insured patients I have reviewed the patient's medical history in detail and updated the computerized patient record. We had a prolonged discussion about these complex clinical issues and went over the various important aspects to consider. All questions were answered.   
 
Advised her to call back or return to office if symptoms do not improve, change in nature, or persist. 
 
She was given an after visit summary or informed of [facility] which includes patient instructions, diagnoses, current medications, & vitals. She expressed understanding with the diagnosis and plan. Modesta Ng is a 79 y.o. female and presents for annual Medicare Wellness Visit. Assessment of cognitive impairment: Alert and oriented x 3. Patient denies concerns about cognitive impairment. Problem List: Reviewed with patient and discussed risk factors. Patient Active Problem List  
Diagnosis Code  Pure hypercholesterolemia E78.00  Post-menopausal Z78.0  
 Hx of screening mammography Z92.89  
 Pap smear for cervical cancer screening Z12.4  Screening for osteoporosis Z13.820  
 Osteopenia M85.80  
 Skin cancer C44.90  Pneumonia, organism unspecified(486) J18.9  
 Hx of iron deficiency anemia Z86.2  Heart murmur R01.1  FHx: breast cancer in first degree relative Z80.3  Oral dysplasia, acquired K15.74  Benign essential hypertension I10  
 Former tobacco use F10.794 Current medical providers:  Patient Care Team: 
Shanita Abdalla MD as PCP - General (Internal Medicine) End of Life Planning: This was discussed with her today and she  has NO advanced directive  - add't info provided. Reviewed DNR/DNI and patient is no. Depression Screen: Reviewed PQH2 done by nurse. PHQ over the last two weeks 10/2/2018 PHQ Not Done - Little interest or pleasure in doing things Not at all Feeling down, depressed, irritable, or hopeless Several days Total Score PHQ 2 1 Fall Risk:  
Fall Risk Assessment, last 12 mths 10/2/2018 Able to walk? Yes Fall in past 12 months? No  
 
 
Home Safety - discussion completed. No issues found. Hearing Loss: 
denies any hearing loss Activities of Daily Living: 
Self-care. Requires assistance with: no ADLs Adult Nutrition Screen: No risk factors noted. Health Maintenance- Reviewed AAA Screening: 
Glaucoma Screening:  
 
 
Health Maintenance Due Topic Date Due  
  Shingrix Vaccine Age 50> (1 of 2) 02/26/2002  Pneumococcal 65+ High/Highest Risk (1 of 2 - PCV13) 02/26/2017  Influenza Age 5 to Adult  08/01/2018  MEDICARE YEARLY EXAM  08/12/2018 Health Maintenance reviewed - 
Plan:   
 
Orders Placed This Encounter  US ABD COMP  Influenza Vaccine Inactivated (IIV)(FLUAD), Subunit, Adjuvanted, IM, (53467)  GGT  AST  HEPATITIS PANEL, ACUTE  CK  Administration fee () for Medicare insured patients Plan:   
Diagnoses and all orders for this visit: 1. Elevated liver function tests 
-     US ABD COMP; Future 
-     GGT 
-     AST 
-     HEPATITIS PANEL, ACUTE 2. Encounter for immunization -     Influenza Vaccine Inactivated (IIV)(FLUAD), Subunit, Adjuvanted, IM, (99660) -     Administration fee () for Medicare insured patients 3. Liver disease  
-     GGT 
-     CK Orders Placed This Encounter  US ABD COMP  Influenza Vaccine Inactivated (IIV)(FLUAD), Subunit, Adjuvanted, IM, (00523)  GGT  AST  HEPATITIS PANEL, ACUTE  CK  Administration fee () for Medicare insured patients Follow-up Disposition: Not on File Reviewed with patient the treatment plan, goals of treatment plan, and limitations of treatment plan, to include the potential for side effects from medications and procedures. If side effects occur, it is the responsibility of the patient to inform the clinic so that a change in the treatment plan can be made in a safe manner. The patient is advised that stopping prescribed medication may cause an increase in symptoms and possible medication withdrawal symptoms. The patient is informed an emergency room evaluation may be necessary if this occurs. Patient verbalized understanding and is in agreement with treatment plan as outlined above. All questions answered.

## 2018-10-02 NOTE — MR AVS SNAPSHOT
[address] [facility], [address] [place] [address] 
[phone] Patient: Corinna Jurado MRN: [id number] TR Visit Information Date & Time Provider Department Dept. Phone Encounter #  
 10/2/2018 10:45 AM Jase Alexandra MD [facility] [phone] Follow-up Instructions Return in about 6 months (around 2019) for recheck blood pressure, Follow up 15 minutes. Upcoming Health Maintenance Date Due Shingrix Vaccine Age 50> (1 of 2) 2002 Pneumococcal 65+ High/Highest Risk (1 of 2 - PCV13) 2017 Influenza Age 5 to Adult 2018 MEDICARE YEARLY EXAM 2018 GLAUCOMA SCREENING Q2Y 2019 COLONOSCOPY 2019 BREAST CANCER SCRN MAMMOGRAM 2020 DTaP/Tdap/Td series (2 - Td) 2023 Allergies as of 10/2/2018  Review Complete On: 10/2/2018 By: Jase Alexandra MD  
  
 Severity Noted Reaction Type Reactions Ace Inhibitors  2012   Side Effect Cough Lipitor [Atorvastatin]  10/08/2015    Other (comments) Elevated CK Current Immunizations  Reviewed on 10/19/2016 Name Date Influenza High Dose Vaccine PF 2017 Influenza Vaccine 10/30/2013 Influenza Vaccine (Quad) PF 10/19/2016  1:15 PM, 2016 Influenza Vaccine (Tri) Adjuvanted 10/2/2018 Influenza Vaccine PF 10/24/2014 Pneumococcal Polysaccharide (PPSV-23) 3/31/2014 Tdap 2013 Zoster Vaccine, Live 2015 Not reviewed this visit You Were Diagnosed With   
  
 Codes Comments Elevated liver function tests    -  Primary ICD-10-CM: R94.5 ICD-9-CM: 790.6 Encounter for immunization     ICD-10-CM: N41 ICD-9-CM: V03.89 Liver disease     ICD-10-CM: K76.9 ICD-9-CM: 573.9 Benign essential hypertension     ICD-10-CM: I10 
ICD-9-CM: 401.1 Pure hypercholesterolemia     ICD-10-CM: E78.00 ICD-9-CM: 272.0 Screening for depression     ICD-10-CM: Z13.31 
ICD-9-CM: V79.0 Medicare annual wellness visit, subsequent     ICD-10-CM: Z00.00 ICD-9-CM: V70.0 Vitals BP Pulse Temp Resp Height(growth percentile) Weight(growth percentile) 142/82 (BP 1 Location: Left arm, BP Patient Position: Sitting) 98 97.8 °F (36.6 °C) (Oral) 18 5' 5.2\" (1.656 m) 136 lb (61.7 kg) SpO2 BMI OB Status Smoking Status 100% 22.49 kg/m2 Postmenopausal Former Smoker Vitals History BMI and BSA Data Body Mass Index Body Surface Area  
 22.49 kg/m 2 1.68 m 2 Preferred Pharmacy Pharmacy Name Phone [facility]2222[facility], [address] [place] [phone] Your Updated Medication List  
  
   
This list is accurate as of 10/2/18 11:39 AM.  Always use your most recent med list. amLODIPine 5 mg tablet Commonly known as:  Winifred Gomez TAKE 1 TABLET BY MOUTH DAILY  
  
 aspirin delayed-release 81 mg tablet Take  by mouth daily. calcium-cholecalciferol (D3) tablet Commonly known as:  CALTRATE 600+D Take 1 Tab by mouth two (2) times a day. FISH OIL 1,000 mg Cap Generic drug:  omega-3 fatty acids-vitamin e Take 1 Cap by mouth two (2) times a day. loratadine 10 mg tablet Commonly known as:  Moe Putty Take 1 Tab by mouth daily. losartan 50 mg tablet Commonly known as:  COZAAR  
TAKE 1 TABLET BY MOUTH EVERY DAY  
  
 NASACORT AQ 55 mcg nasal inhaler Generic drug:  triamcinolone 2 Sprays by Both Nostrils route daily as needed. pneumococcal 13 yana conj dip 0.5 mL Syrg injection Commonly known as:  PREVNAR-13  
0.5 mL by IntraMUSCular route once for 1 dose. rosuvastatin 20 mg tablet Commonly known as:  CRESTOR  
TAKE 1 TABLET BY MOUTH NIGHTLY Prescriptions Printed  Refills  
 pneumococcal 13 yana conj dip (PREVNAR-13) 0.5 mL syrg injection 0  
 Si.5 mL by IntraMUSCular route once for 1 dose. Class: Print Route: IntraMUSCular We Performed the Following ADMIN INFLUENZA VIRUS VAC [ HCP] AST [id number] CPT(R)] CK [id number] CPT(R)] GGT [26159 CPT(R)] HEPATITIS PANEL, ACUTE [30122 CPT(R)] INFLUENZA VACCINE INACTIVATED (IIV), SUBUNIT, ADJUVANTED, IM [id number] CPT(R)] Follow-up Instructions Return in about 6 months (around 2019) for recheck blood pressure, Follow up 15 minutes. To-Do List   
 10/02/2018 Imaging:  US ABD COMP Patient Instructions Vaccine Information Statement Influenza (Flu) Vaccine (Inactivated or Recombinant): What you need to know Many Vaccine Information Statements are available in Fijian and other languages. See www.immunize.org/vis Hojas de Información Sobre Vacunas están disponibles en Español y en muchos otros idiomas. Visite www.immunize.org/vis 1. Why get vaccinated? Influenza (flu) is a contagious disease that spreads around the [facility] every year, usually between October and May. Flu is caused by influenza viruses, and is spread mainly by coughing, sneezing, and close contact. Anyone can get flu. Flu strikes suddenly and can last several days. Symptoms vary by age, but can include: 
 fever/chills  sore throat  muscle aches  fatigue  cough  headache  runny or stuffy nose Flu can also lead to pneumonia and blood infections, and cause diarrhea and seizures in children. If you have a medical condition, such as heart or lung disease, flu can make it worse. Flu is more dangerous for some people. Infants and young children, people 72years of age and older, pregnant women, and people with certain health conditions or a weakened immune system are at greatest risk. Each year thousands of people in the [facility] die from flu, and many more are hospitalized.   
 
Flu vaccine can: 
 keep you from getting flu, 
  make flu less severe if you do get it, and 
 keep you from spreading flu to your family and other people. 2. Inactivated and recombinant flu vaccines A dose of flu vaccine is recommended every flu season. Children 6 months through 6years of age may need two doses during the same flu season. Everyone else needs only one dose each flu season. Some inactivated flu vaccines contain a very small amount of a mercury-based preservative called thimerosal. Studies have not shown thimerosal in vaccines to be harmful, but flu vaccines that do not contain thimerosal are available. There is no live flu virus in flu shots. They cannot cause the flu. There are many flu viruses, and they are always changing. Each year a new flu vaccine is made to protect against three or four viruses that are likely to cause disease in the upcoming flu season. But even when the vaccine doesnt exactly match these viruses, it may still provide some protection Flu vaccine cannot prevent: 
 flu that is caused by a virus not covered by the vaccine, or 
 illnesses that look like flu but are not. It takes about 2 weeks for protection to develop after vaccination, and protection lasts through the flu season. 3. Some people should not get this vaccine Tell the person who is giving you the vaccine:  If you have any severe, life-threatening allergies. If you ever had a life-threatening allergic reaction after a dose of flu vaccine, or have a severe allergy to any part of this vaccine, you may be advised not to get vaccinated. Most, but not all, types of flu vaccine contain a small amount of egg protein.  If you ever had Guillain-Barré Syndrome (also called GBS). Some people with a history of GBS should not get this vaccine. This should be discussed with your doctor.  If you are not feeling well.    
It is usually okay to get flu vaccine when you have a mild illness, but you might be asked to come back when you feel better. 4. Risks of a vaccine reaction With any medicine, including vaccines, there is a chance of reactions. These are usually mild and go away on their own, but serious reactions are also possible. Most people who get a flu shot do not have any problems with it. Minor problems following a flu shot include:  
 soreness, redness, or swelling where the shot was given  hoarseness  sore, red or itchy eyes  cough  fever  aches  headache  itching  fatigue If these problems occur, they usually begin soon after the shot and last 1 or 2 days. More serious problems following a flu shot can include the following:  There may be a small increased risk of Guillain-Barré Syndrome (GBS) after inactivated flu vaccine. This risk has been estimated at 1 or 2 additional cases per million people vaccinated. This is much lower than the risk of severe complications from flu, which can be prevented by flu vaccine.  Young children who get the flu shot along with pneumococcal vaccine (PCV13) and/or DTaP vaccine at the same time might be slightly more likely to have a seizure caused by fever. Ask your doctor for more information. Tell your doctor if a child who is getting flu vaccine has ever had a seizure. Problems that could happen after any injected vaccine:  People sometimes faint after a medical procedure, including vaccination. Sitting or lying down for about 15 minutes can help prevent fainting, and injuries caused by a fall. Tell your doctor if you feel dizzy, or have vision changes or ringing in the ears.  Some people get severe pain in the shoulder and have difficulty moving the arm where a shot was given. This happens very rarely.  Any medication can cause a severe allergic reaction.  Such reactions from a vaccine are very rare, estimated at about 1 in a million doses, and would happen within a few minutes to a few hours after the vaccination. As with any medicine, there is a very remote chance of a vaccine causing a serious injury or death. The safety of vaccines is always being monitored. For more information, visit: www.cdc.gov/vaccinesafety/ 
 
5. What if there is a serious reaction? What should I look for?  Look for anything that concerns you, such as signs of a severe allergic reaction, very high fever, or unusual behavior. Signs of a severe allergic reaction can include hives, swelling of the face and throat, difficulty breathing, a fast heartbeat, dizziness, and weakness  usually within a few minutes to a few hours after the vaccination. What should I do?  If you think it is a severe allergic reaction or other emergency that cant wait, call 9-1-1 and get the person to the nearest hospital. Otherwise, call your doctor.  Reactions should be reported to the Vaccine Adverse Event Reporting System (VAERS). Your doctor should file this report, or you can do it yourself through  the VAERS web site at www.vaers. [facility].gov, or by calling 2-310.751.3116. VAERS does not give medical advice. 6. The National Vaccine Injury Compensation Program 
 
The [facility] Vaccine Injury Compensation Program (VICP) is a federal program that was created to compensate people who may have been injured by certain vaccines. Persons who believe they may have been injured by a vaccine can learn about the program and about filing a claim by calling 9-380.208.8901 or visiting the [employer] website at www.[facility].gov/vaccinecompensation. There is a time limit to file a claim for compensation. 7. How can I learn more?  Ask your healthcare provider. He or she can give you the vaccine package insert or suggest other sources of information.  Call your local or state health department.  
 Contact the Centers for Disease Control and Prevention (CDC): 
 - Call 5-716.670.1527 (1-800-CDC-INFO) or 
- Visit CDCs website at www.cdc.gov/flu Vaccine Information Statement Inactivated Influenza Vaccine 8/7/2015 
42 RICH Gibson Officer [id number] [facility] and [employer] Centers for Disease Control and Prevention Office Use Only Introducing [facility]! Dear Clark Barnett: 
Thank you for requesting a [employer] account. Our records indicate that you already have an active [employer] account. You can access your account anytime at [email or web address] Did you know that you can access your hospital and ER discharge instructions at any time in [employer]? You can also review all of your test results from your hospital stay or ER visit. Additional Information If you have questions, please visit the Frequently Asked Questions section of the [employer] website at [email or web address] Remember, [employer] is NOT to be used for urgent needs. For medical emergencies, dial 911. Now available from your iPhone and Android! Please provide this summary of care documentation to your next provider. Your primary care clinician is listed as Izabel Tavarez. If you have any questions after today's visit, please call [phone].

## 2018-10-02 NOTE — PATIENT INSTRUCTIONS
Vaccine Information Statement Influenza (Flu) Vaccine (Inactivated or Recombinant): What you need to know Many Vaccine Information Statements are available in Bengali and other languages. See www.immunize.org/vis Hojas de Información Sobre Vacunas están disponibles en Español y en muchos otros idiomas. Visite www.immunize.org/vis 1. Why get vaccinated? Influenza (flu) is a contagious disease that spreads around the [place] every year, usually between October and May. Flu is caused by influenza viruses, and is spread mainly by coughing, sneezing, and close contact. Anyone can get flu. Flu strikes suddenly and can last several days. Symptoms vary by age, but can include: 
 fever/chills  sore throat  muscle aches  fatigue  cough  headache  runny or stuffy nose Flu can also lead to pneumonia and blood infections, and cause diarrhea and seizures in children. If you have a medical condition, such as heart or lung disease, flu can make it worse. Flu is more dangerous for some people. Infants and young children, people 72years of age and older, pregnant women, and people with certain health conditions or a weakened immune system are at greatest risk. Each year thousands of people in the [facility] die from flu, and many more are hospitalized. Flu vaccine can: 
 keep you from getting flu, 
 make flu less severe if you do get it, and 
 keep you from spreading flu to your family and other people. 2. Inactivated and recombinant flu vaccines A dose of flu vaccine is recommended every flu season. Children 6 months through 6years of age may need two doses during the same flu season. Everyone else needs only one dose each flu season.   
 
 
Some inactivated flu vaccines contain a very small amount of a mercury-based preservative called thimerosal. Studies have not shown thimerosal in vaccines to be harmful, but flu vaccines that do not contain thimerosal are available. There is no live flu virus in flu shots. They cannot cause the flu. There are many flu viruses, and they are always changing. Each year a new flu vaccine is made to protect against three or four viruses that are likely to cause disease in the upcoming flu season. But even when the vaccine doesnt exactly match these viruses, it may still provide some protection Flu vaccine cannot prevent: 
 flu that is caused by a virus not covered by the vaccine, or 
 illnesses that look like flu but are not. It takes about 2 weeks for protection to develop after vaccination, and protection lasts through the flu season. 3. Some people should not get this vaccine Tell the person who is giving you the vaccine:  If you have any severe, life-threatening allergies. If you ever had a life-threatening allergic reaction after a dose of flu vaccine, or have a severe allergy to any part of this vaccine, you may be advised not to get vaccinated. Most, but not all, types of flu vaccine contain a small amount of egg protein.  If you ever had Guillain-Barré Syndrome (also called GBS). Some people with a history of GBS should not get this vaccine. This should be discussed with your doctor.  If you are not feeling well. It is usually okay to get flu vaccine when you have a mild illness, but you might be asked to come back when you feel better. 4. Risks of a vaccine reaction With any medicine, including vaccines, there is a chance of reactions. These are usually mild and go away on their own, but serious reactions are also possible. Most people who get a flu shot do not have any problems with it. Minor problems following a flu shot include:  
 soreness, redness, or swelling where the shot was given  hoarseness  sore, red or itchy eyes  cough  fever  aches  headache  itching  fatigue If these problems occur, they usually begin soon after the shot and last 1 or 2 days. More serious problems following a flu shot can include the following:  There may be a small increased risk of Guillain-Barré Syndrome (GBS) after inactivated flu vaccine. This risk has been estimated at 1 or 2 additional cases per million people vaccinated. This is much lower than the risk of severe complications from flu, which can be prevented by flu vaccine.  Young children who get the flu shot along with pneumococcal vaccine (PCV13) and/or DTaP vaccine at the same time might be slightly more likely to have a seizure caused by fever. Ask your doctor for more information. Tell your doctor if a child who is getting flu vaccine has ever had a seizure. Problems that could happen after any injected vaccine:  People sometimes faint after a medical procedure, including vaccination. Sitting or lying down for about 15 minutes can help prevent fainting, and injuries caused by a fall. Tell your doctor if you feel dizzy, or have vision changes or ringing in the ears.  Some people get severe pain in the shoulder and have difficulty moving the arm where a shot was given. This happens very rarely.  Any medication can cause a severe allergic reaction. Such reactions from a vaccine are very rare, estimated at about 1 in a million doses, and would happen within a few minutes to a few hours after the vaccination. As with any medicine, there is a very remote chance of a vaccine causing a serious injury or death. The safety of vaccines is always being monitored. For more information, visit: www.cdc.gov/vaccinesafety/ 
 
5. What if there is a serious reaction? What should I look for?  Look for anything that concerns you, such as signs of a severe allergic reaction, very high fever, or unusual behavior.  
 
Signs of a severe allergic reaction can include hives, swelling of the face and throat, difficulty breathing, a fast heartbeat, dizziness, and weakness  usually within a few minutes to a few hours after the vaccination. What should I do?  If you think it is a severe allergic reaction or other emergency that cant wait, call 9-1-1 and get the person to the nearest hospital. Otherwise, call your doctor.  Reactions should be reported to the Vaccine Adverse Event Reporting System (VAERS). Your doctor should file this report, or you can do it yourself through  the VAERS web site at www.vaers. [facility].gov, or by calling 0-428.595.4250. VAERS does not give medical advice. 6. The National Vaccine Injury Compensation Program 
 
The [facility] Vaccine Injury Compensation Program (VICP) is a federal program that was created to compensate people who may have been injured by certain vaccines. Persons who believe they may have been injured by a vaccine can learn about the program and about filing a claim by calling 4-996.260.6618 or visiting the [employer] website at www.[facility].gov/vaccinecompensation. There is a time limit to file a claim for compensation. 7. How can I learn more?  Ask your healthcare provider. He or she can give you the vaccine package insert or suggest other sources of information.  Call your local or state health department.  Contact the Centers for Disease Control and Prevention (CDC): 
- Call 0-125.337.4038 (1-800-CDC-INFO) or 
- Visit CDCs website at www.cdc.gov/flu Vaccine Information Statement Inactivated Influenza Vaccine 8/7/2015 
42 U. Benny Camera [id number] Department of [facility] and [employer] Centers for Disease Control and Prevention Office Use Only

## 2018-10-03 LAB
AST SERPL-CCNC: 41 IU/L (ref 0–40)
CK SERPL-CCNC: 107 U/L (ref 24–173)
GGT SERPL-CCNC: 27 IU/L (ref 0–60)
HAV IGM SERPL QL IA: NEGATIVE
HBV CORE IGM SERPL QL IA: NEGATIVE
HBV SURFACE AG SERPL QL IA: NEGATIVE
HCV AB S/CO SERPL IA: <0.1 S/CO RATIO (ref 0–0.9)

## 2018-10-04 NOTE — PROGRESS NOTES
Your liver tests went back to normal and the hepatitis tests were all normal.  I would still try to reduce the alcohol to one drink a night Message sent to patient via [employer]: 
October 3, 2018

## 2018-11-21 RX ORDER — LOSARTAN POTASSIUM 50 MG/1
TABLET ORAL
Qty: 90 TAB | Refills: 3 | Status: SHIPPED | OUTPATIENT
Start: 2018-11-21 | End: 2019-03-29 | Stop reason: SDUPTHER

## 2018-11-21 NOTE — TELEPHONE ENCOUNTER
PCP: Mery Sadler MD    Last appt: 10/2/2018  No future appointments.     Requested Prescriptions     Pending Prescriptions Disp Refills    losartan (COZAAR) 50 mg tablet 90 Tab 3

## 2019-03-29 PROBLEM — Z63.4 GRIEF AT LOSS OF CHILD: Status: ACTIVE | Noted: 2019-03-29

## 2019-03-29 PROBLEM — F43.21 GRIEF AT LOSS OF CHILD: Status: ACTIVE | Noted: 2019-03-29

## 2019-10-14 ENCOUNTER — HOSPITAL ENCOUNTER (OUTPATIENT)
Dept: MAMMOGRAPHY | Age: 69
Discharge: HOME | End: 2019-10-14
Attending: FAMILY MEDICINE
Payer: MEDICARE

## 2019-10-14 DIAGNOSIS — Z12.31 SCREENING MAMMOGRAM FOR HIGH-RISK PATIENT: ICD-10-CM

## 2019-10-14 PROCEDURE — 77063 BREAST TOMOSYNTHESIS BI: CPT

## 2019-10-18 NOTE — PROGRESS NOTES
Patient identified by two patient identifiers, name and date of birth. Spoke with patient. Patient aware of results and states understanding at this time.

## 2020-08-20 DIAGNOSIS — E78.2 MIXED HYPERLIPIDEMIA: ICD-10-CM

## 2020-08-20 RX ORDER — ROSUVASTATIN CALCIUM 20 MG/1
TABLET, COATED ORAL
Qty: 90 TAB | Refills: 0 | Status: SHIPPED | OUTPATIENT
Start: 2020-08-20 | End: 2020-10-29 | Stop reason: SDUPTHER

## 2020-10-09 ENCOUNTER — TRANSCRIBE ORDER (OUTPATIENT)
Dept: SCHEDULING | Age: 70
End: 2020-10-09

## 2020-10-09 DIAGNOSIS — Z12.31 VISIT FOR SCREENING MAMMOGRAM: Primary | ICD-10-CM

## 2020-11-16 ENCOUNTER — HOSPITAL ENCOUNTER (OUTPATIENT)
Dept: MAMMOGRAPHY | Age: 70
Discharge: HOME | End: 2020-11-16
Attending: FAMILY MEDICINE
Payer: MEDICARE

## 2020-11-16 DIAGNOSIS — Z12.31 VISIT FOR SCREENING MAMMOGRAM: ICD-10-CM

## 2020-11-16 PROCEDURE — 77063 BREAST TOMOSYNTHESIS BI: CPT

## 2021-03-19 ENCOUNTER — OFFICE VISIT (OUTPATIENT)
Dept: FAMILY MEDICINE CLINIC | Age: 71
End: 2021-03-19
Payer: MEDICARE

## 2021-03-19 VITALS
BODY MASS INDEX: 21.59 KG/M2 | RESPIRATION RATE: 18 BRPM | HEART RATE: 88 BPM | DIASTOLIC BLOOD PRESSURE: 80 MMHG | TEMPERATURE: 95.8 F | WEIGHT: 129.6 LBS | SYSTOLIC BLOOD PRESSURE: 139 MMHG | OXYGEN SATURATION: 99 % | HEIGHT: 65 IN

## 2021-03-19 DIAGNOSIS — M16.11 PRIMARY OSTEOARTHRITIS OF RIGHT HIP: ICD-10-CM

## 2021-03-19 DIAGNOSIS — I10 BENIGN ESSENTIAL HYPERTENSION: Primary | ICD-10-CM

## 2021-03-19 PROCEDURE — G8427 DOCREV CUR MEDS BY ELIG CLIN: HCPCS | Performed by: FAMILY MEDICINE

## 2021-03-19 PROCEDURE — G8399 PT W/DXA RESULTS DOCUMENT: HCPCS | Performed by: FAMILY MEDICINE

## 2021-03-19 PROCEDURE — G8752 SYS BP LESS 140: HCPCS | Performed by: FAMILY MEDICINE

## 2021-03-19 PROCEDURE — 1101F PT FALLS ASSESS-DOCD LE1/YR: CPT | Performed by: FAMILY MEDICINE

## 2021-03-19 PROCEDURE — G8536 NO DOC ELDER MAL SCRN: HCPCS | Performed by: FAMILY MEDICINE

## 2021-03-19 PROCEDURE — G9899 SCRN MAM PERF RSLTS DOC: HCPCS | Performed by: FAMILY MEDICINE

## 2021-03-19 PROCEDURE — 1090F PRES/ABSN URINE INCON ASSESS: CPT | Performed by: FAMILY MEDICINE

## 2021-03-19 PROCEDURE — 99213 OFFICE O/P EST LOW 20 MIN: CPT | Performed by: FAMILY MEDICINE

## 2021-03-19 PROCEDURE — 3017F COLORECTAL CA SCREEN DOC REV: CPT | Performed by: FAMILY MEDICINE

## 2021-03-19 PROCEDURE — G8754 DIAS BP LESS 90: HCPCS | Performed by: FAMILY MEDICINE

## 2021-03-19 PROCEDURE — G8510 SCR DEP NEG, NO PLAN REQD: HCPCS | Performed by: FAMILY MEDICINE

## 2021-03-19 PROCEDURE — G8420 CALC BMI NORM PARAMETERS: HCPCS | Performed by: FAMILY MEDICINE

## 2021-03-19 RX ORDER — NAPROXEN 500 MG/1
500 TABLET ORAL 2 TIMES DAILY WITH MEALS
Qty: 180 TAB | Refills: 1 | Status: SHIPPED | OUTPATIENT
Start: 2021-03-19 | End: 2021-09-15

## 2021-03-19 NOTE — PROGRESS NOTES
Yvonne Mitchell is a 73 y.o. female who presents with the following:  Chief Complaint   Patient presents with    Hypertension    Hip Pain       Patient's back in for follow-up of her hypertension which has improved but it still on the borderline by our sphygmomanometer. I talked with the patient about increasing her medicine slightly to lower it a bit more but she states that at home her machine is reading her blood pressure considerably lower but she has not had it calibrated. I suggested she bring her machine in the next time she is coming in and will check it against ours. Patient also complains about right hip pain which she states she has had for over a year and has had x-rays done and the orthopedic surgeon stated there was nothing of any significance there but she states it still gives her achy pain and occasionally a sharp pain. Allergies   Allergen Reactions    Ace Inhibitors Cough    Lipitor [Atorvastatin] Other (comments)     Elevated CK       Current Outpatient Medications   Medication Sig    naproxen (NAPROSYN) 500 mg tablet Take 1 Tab by mouth two (2) times daily (with meals).  amLODIPine (NORVASC) 5 mg tablet TAKE 1 TABLET BY MOUTH EVERY DAY    traZODone (DESYREL) 50 mg tablet TAKE 1 TABLET BY MOUTH AT NIGHT    losartan (COZAAR) 50 mg tablet Take 1 Tab by mouth daily. TAKE 1 TABLET BY MOUTH ONCE DAILY    rosuvastatin (CRESTOR) 20 mg tablet TAKE 1 TABLET BY MOUTH EVERY DAY AT NIGHT    cream base no. 157 (Q-DERM) Take 15 mg by mouth daily.  multivit-minerals/folic acid (CENTRUM VITAMINTS PO) Take  by mouth.  omega-3 fatty acids-vitamin e (FISH OIL) 1,000 mg cap Take 1 Cap by mouth two (2) times a day.  loratadine (CLARITIN) 10 mg tablet Take 1 Tab by mouth daily. (Patient taking differently: Take 10 mg by mouth daily as needed.)    calcium-cholecalciferol, D3, (CALTRATE 600+D) tablet Take 1 Tab by mouth two (2) times a day.     aspirin delayed-release 81 mg tablet Take  by mouth daily.  CALCIUM-MAGNESIUM-ZINC PO Take  by mouth.  Cetirizine (ZYRTEC) 10 mg cap Take  by mouth.  triamcinolone (NASACORT AQ) 55 mcg nasal inhaler 2 Sprays by Both Nostrils route daily as needed. No current facility-administered medications for this visit. Past Medical History:   Diagnosis Date    Basal cell carcinoma of skin     Colon polyps     Elevated CK 3/25/15    mild    Heart murmur 16    echo showed trivial MR and trivial TR    Hemorrhoids     per Dr. Serge Washington Hyperlipidemia LDL goal < 100     Hypertension     Oral dysplasia, acquired 2016    Dr. Taz Bryant Osteopenia 2013    Pneumonia 3/21/14    Tobacco dependence     quit 3/19/14, restarted 2014       Past Surgical History:   Procedure Laterality Date    ENDOSCOPY, COLON, DIAGNOSTIC  , 14    colon polyps seen on both studies, repeat every 5 years (Dr. Sherie Lenz)    HX CHOLECYSTECTOMY      HX GYN      tubal ligation    HX ORTHOPAEDIC      foot bunion removal, mortons neuroma; Joyce Buerger    HX OTHER SURGICAL      MOHS, BCC (Dr. Steffanie Lloyd)       Family History   Problem Relation Age of Onset    Arthritis-osteo Mother     Heart Disease Mother     Heart Attack Mother         72    Osteoporosis Mother     Hypertension Mother     Stroke Father     Heart Disease Father     Hypertension Father     Breast Cancer Sister     Hypertension Daughter        Social History     Socioeconomic History    Marital status:      Spouse name: Not on file    Number of children: Not on file    Years of education: Not on file    Highest education level: Not on file   Tobacco Use    Smoking status: Current Every Day Smoker     Packs/day: 0.75     Years: 43.00     Pack years: 32.25     Types: Cigarettes     Last attempt to quit: 10/3/2016     Years since quittin.4    Smokeless tobacco: Never Used   Substance and Sexual Activity    Alcohol use:  Yes     Alcohol/week: 14.0 standard drinks Types: 14 Shots of liquor per week     Frequency: 4 or more times a week     Drinks per session: 1 or 2     Binge frequency: Never    Drug use: No    Sexual activity: Not Currently     Partners: Male       Review of Systems   Constitutional: Negative for chills, fever, malaise/fatigue and weight loss. HENT: Negative for congestion, hearing loss, sore throat and tinnitus. Eyes: Negative for blurred vision, pain and discharge. Respiratory: Negative for cough, shortness of breath and wheezing. Cardiovascular: Negative for chest pain, palpitations, orthopnea, claudication and leg swelling. Gastrointestinal: Negative for abdominal pain, constipation and heartburn. Genitourinary: Negative for dysuria, frequency and urgency. Musculoskeletal: Positive for back pain and joint pain. Negative for falls and myalgias. Skin: Negative for itching and rash. Neurological: Negative for dizziness, tingling, tremors and headaches. Endo/Heme/Allergies: Negative for environmental allergies and polydipsia. Psychiatric/Behavioral: Negative for depression and substance abuse. The patient is not nervous/anxious. Visit Vitals  /80   Pulse 88   Temp (!) 95.8 °F (35.4 °C)   Resp 18   Ht 5' 5.2\" (1.656 m)   Wt 129 lb 9.6 oz (58.8 kg)   SpO2 99%   BMI 21.43 kg/m²     Physical Exam  Vitals signs reviewed. Constitutional:       General: She is not in acute distress. Appearance: Normal appearance. She is not ill-appearing. HENT:      Head: Normocephalic and atraumatic. Right Ear: Tympanic membrane, ear canal and external ear normal.      Left Ear: Tympanic membrane, ear canal and external ear normal.      Nose: Nose normal. No congestion or rhinorrhea. Mouth/Throat:      Mouth: Mucous membranes are moist.      Pharynx: No posterior oropharyngeal erythema. Eyes:      General:         Right eye: No discharge. Left eye: No discharge.       Extraocular Movements: Extraocular movements intact. Conjunctiva/sclera: Conjunctivae normal.      Pupils: Pupils are equal, round, and reactive to light. Comments: Cornea anterior chamber and iris are normal.   Neck:      Musculoskeletal: Normal range of motion and neck supple. Trachea: No tracheal deviation. Cardiovascular:      Rate and Rhythm: Normal rate and regular rhythm. Pulses: Normal pulses. Heart sounds: Normal heart sounds. No murmur. No friction rub. No gallop. Pulmonary:      Effort: Pulmonary effort is normal. No respiratory distress. Breath sounds: Normal breath sounds. No wheezing or rhonchi. Chest:      Chest wall: No tenderness. Abdominal:      General: Bowel sounds are normal. There is no distension. Palpations: Abdomen is soft. There is no mass. Tenderness: There is no abdominal tenderness. There is no guarding or rebound. Musculoskeletal:         General: Tenderness (Patient is tender directly over the right hip joint and when the Florentino Boer is done it increases her pain.) present. No deformity. Lymphadenopathy:      Cervical: No cervical adenopathy. Skin:     General: Skin is warm and dry. Coloration: Skin is not pale. Findings: No erythema or rash. Neurological:      General: No focal deficit present. Mental Status: She is alert and oriented to person, place, and time. Cranial Nerves: No cranial nerve deficit. Motor: No abnormal muscle tone. Deep Tendon Reflexes: Reflexes are normal and symmetric. Reflexes normal.      Comments: Cranial nerves II through XII are intact sensory and motor. Biceps triceps knee and ankle DTRs are normal and symmetrical.   Psychiatric:         Mood and Affect: Mood normal.         Behavior: Behavior normal.           ICD-10-CM ICD-9-CM    1. Benign essential hypertension  I10 401.1    2.  Primary osteoarthritis of right hip  M16.11 715.15 naproxen (NAPROSYN) 500 mg tablet       Orders Placed This Encounter    naproxen (NAPROSYN) 500 mg tablet     Sig: Take 1 Tab by mouth two (2) times daily (with meals). Dispense:  180 Tab     Refill:  1   Patient cautioned that naproxen has sodium in it and it could raise her blood pressure some so I would advise cutting back on the sodium that she uses in her cooking or in her foods.         Valeria Davis MD

## 2021-03-19 NOTE — PROGRESS NOTES
1. Have you been to the ER, urgent care clinic since your last visit? Hospitalized since your last visit?no    2. Have you seen or consulted any other health care providers outside of the [address] [place] [address] since your last visit? Include any pap smears or colon screening.  no

## 2021-09-13 DIAGNOSIS — I10 ESSENTIAL HYPERTENSION: ICD-10-CM

## 2021-09-13 RX ORDER — LOSARTAN POTASSIUM 50 MG/1
50 TABLET ORAL DAILY
Qty: 30 TABLET | Refills: 0 | Status: SHIPPED | OUTPATIENT
Start: 2021-09-13 | End: 2021-10-05 | Stop reason: SDUPTHER

## 2021-09-13 NOTE — TELEPHONE ENCOUNTER
Message routed to provider to approve refill. Requested Prescriptions     Pending Prescriptions Disp Refills    losartan (COZAAR) 50 mg tablet 90 Tablet 0     Sig: Take 1 Tablet by mouth daily.

## 2021-09-13 NOTE — TELEPHONE ENCOUNTER
Pt made appointment in October for her Medicare Wellness and med refill. Can Dr. Julissa Jones call in enough refill of Losartan Potassium to get to appointment?

## 2021-10-05 ENCOUNTER — HOSPITAL ENCOUNTER (OUTPATIENT)
Dept: LAB | Age: 71
Discharge: HOME | End: 2021-10-05
Payer: MEDICARE

## 2021-10-05 ENCOUNTER — OFFICE VISIT (OUTPATIENT)
Dept: FAMILY MEDICINE CLINIC | Age: 71
End: 2021-10-05
Payer: MEDICARE

## 2021-10-05 VITALS
WEIGHT: 128.5 LBS | HEART RATE: 80 BPM | HEIGHT: 65 IN | SYSTOLIC BLOOD PRESSURE: 126 MMHG | DIASTOLIC BLOOD PRESSURE: 68 MMHG | TEMPERATURE: 97 F | RESPIRATION RATE: 18 BRPM | BODY MASS INDEX: 21.41 KG/M2 | OXYGEN SATURATION: 97 %

## 2021-10-05 DIAGNOSIS — Z00.00 MEDICARE ANNUAL WELLNESS VISIT, SUBSEQUENT: Primary | ICD-10-CM

## 2021-10-05 DIAGNOSIS — Z87.891 PERSONAL HISTORY OF TOBACCO USE, PRESENTING HAZARDS TO HEALTH: ICD-10-CM

## 2021-10-05 DIAGNOSIS — F43.21 GRIEF AT LOSS OF CHILD: ICD-10-CM

## 2021-10-05 DIAGNOSIS — Z23 NEEDS FLU SHOT: ICD-10-CM

## 2021-10-05 DIAGNOSIS — Z12.4 PAP SMEAR FOR CERVICAL CANCER SCREENING: ICD-10-CM

## 2021-10-05 DIAGNOSIS — Z63.4 GRIEF AT LOSS OF CHILD: ICD-10-CM

## 2021-10-05 DIAGNOSIS — E78.2 MIXED HYPERLIPIDEMIA: ICD-10-CM

## 2021-10-05 DIAGNOSIS — I10 ESSENTIAL HYPERTENSION: ICD-10-CM

## 2021-10-05 PROCEDURE — 90694 VACC AIIV4 NO PRSRV 0.5ML IM: CPT | Performed by: FAMILY MEDICINE

## 2021-10-05 PROCEDURE — G0101 CA SCREEN;PELVIC/BREAST EXAM: HCPCS | Performed by: FAMILY MEDICINE

## 2021-10-05 PROCEDURE — G0439 PPPS, SUBSEQ VISIT: HCPCS | Performed by: FAMILY MEDICINE

## 2021-10-05 PROCEDURE — G0008 ADMIN INFLUENZA VIRUS VAC: HCPCS | Performed by: FAMILY MEDICINE

## 2021-10-05 PROCEDURE — 88175 CYTOPATH C/V AUTO FLUID REDO: CPT

## 2021-10-05 PROCEDURE — 36415 COLL VENOUS BLD VENIPUNCTURE: CPT | Performed by: FAMILY MEDICINE

## 2021-10-05 PROCEDURE — 99214 OFFICE O/P EST MOD 30 MIN: CPT | Performed by: FAMILY MEDICINE

## 2021-10-05 RX ORDER — ROSUVASTATIN CALCIUM 20 MG/1
TABLET, COATED ORAL
Qty: 90 TABLET | Refills: 1 | Status: SHIPPED | OUTPATIENT
Start: 2021-10-05 | End: 2022-03-29 | Stop reason: SDUPTHER

## 2021-10-05 RX ORDER — TRAZODONE HYDROCHLORIDE 50 MG/1
TABLET ORAL
Qty: 90 TABLET | Refills: 1 | Status: SHIPPED | OUTPATIENT
Start: 2021-10-05 | End: 2022-03-29 | Stop reason: SDUPTHER

## 2021-10-05 RX ORDER — LOSARTAN POTASSIUM 50 MG/1
50 TABLET ORAL DAILY
Qty: 90 TABLET | Refills: 1 | Status: SHIPPED | OUTPATIENT
Start: 2021-10-05 | End: 2022-03-29 | Stop reason: SDUPTHER

## 2021-10-05 RX ORDER — AMLODIPINE BESYLATE 5 MG/1
5 TABLET ORAL DAILY
Qty: 90 TABLET | Refills: 1 | Status: SHIPPED | OUTPATIENT
Start: 2021-10-05 | End: 2022-03-29 | Stop reason: SDUPTHER

## 2021-10-05 SDOH — SOCIAL STABILITY - SOCIAL INSECURITY: DISSAPEARANCE AND DEATH OF FAMILY MEMBER: Z63.4

## 2021-10-05 NOTE — PROGRESS NOTES
1. Have you been to the ER, urgent care clinic since your last visit? Hospitalized since your last visit? No    2. Have you seen or consulted any other health care providers outside of the [address] [place] [address] since your last visit? Include any pap smears or colon screening. No   This is the Subsequent Medicare Annual Wellness Exam, performed 12 months or more after the Initial AWV or the last Subsequent AWV    I have reviewed the patient's medical history in detail and updated the computerized patient record. Daryl Jameson is a 73 y.o. female who presents with the following:  Chief Complaint   Patient presents with    Annual Wellness Visit    Hypertension    Cholesterol Problem    Depression     Grief at loss of child       Patient's hypertension is doing well on current medication without chest pain shortness of breath nor edema. The patient has hypercholesterolemia which is treated with rosuvastatin without muscle pain or weakness. Unfortunately the patient continues to smoke and we have discussed with her low-dose CT scan screening for lesions that could be early lung cancer and the patient is interested in pursuing this and following up if anything was found. The patient understands that she needs to follow-up yearly with this test.  The patient has also not had a pelvic exam in quite some time and has were doing a general medical examination for her medical problems we elected to pursue this also. The patient is doing better with her depression at the loss of one of her children and states that generally she is sleeping well on just the 50 mg of trazodone. Allergies   Allergen Reactions    Ace Inhibitors Cough    Lipitor [Atorvastatin] Other (comments)     Elevated CK       Current Outpatient Medications   Medication Sig    amLODIPine (NORVASC) 5 mg tablet Take 1 Tablet by mouth daily.  losartan (COZAAR) 50 mg tablet Take 1 Tablet by mouth daily.     traZODone (DESYREL) 50 mg tablet 1 nightly    rosuvastatin (CRESTOR) 20 mg tablet TAKE 1 TABLET BY MOUTH NIGHTLY    naproxen (NAPROSYN) 500 mg tablet TAKE 1 TABLET BY MOUTH TWICE A DAY WITH MEALS    cream base no. 157 (Q-DERM) Take 15 mg by mouth daily.  CALCIUM-MAGNESIUM-ZINC PO Take  by mouth.  Cetirizine (ZYRTEC) 10 mg cap Take  by mouth.  multivit-minerals/folic acid (CENTRUM VITAMINTS PO) Take  by mouth.  omega-3 fatty acids-vitamin e (FISH OIL) 1,000 mg cap Take 1 Cap by mouth two (2) times a day.  loratadine (CLARITIN) 10 mg tablet Take 1 Tab by mouth daily. (Patient taking differently: Take 10 mg by mouth daily as needed.)    triamcinolone (NASACORT AQ) 55 mcg nasal inhaler 2 Sprays by Both Nostrils route daily as needed.  calcium-cholecalciferol, D3, (CALTRATE 600+D) tablet Take 1 Tab by mouth two (2) times a day.  aspirin delayed-release 81 mg tablet Take  by mouth daily. No current facility-administered medications for this visit.        Past Medical History:   Diagnosis Date    Basal cell carcinoma of skin     Colon polyps     Elevated CK 3/25/15    mild    Heart murmur 4/7/16    echo showed trivial MR and trivial TR    Hemorrhoids     per Dr. 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 Taz Morataya Osteopenia 7/2013    Pneumonia 3/21/14    Tobacco dependence     quit 3/19/14, restarted 5/2014       Past Surgical History:   Procedure Laterality Date    ENDOSCOPY, COLON, DIAGNOSTIC  2010, 5/23/14    colon polyps seen on both studies, repeat every 5 years (Dr. Patrick St)    HX CHOLECYSTECTOMY      HX GYN      tubal ligation    HX ORTHOPAEDIC  1995    foot bunion removal, mortons neuroma; Orvis Savin    HX OTHER SURGICAL      MOHS, [facility] (Dr. Bryan Koenig)       Family History   Problem Relation Age of Onset    Arthritis-osteo Mother     Heart Disease Mother     Heart Attack Mother         72    Osteoporosis Mother     Hypertension Mother  Stroke Father     Heart Disease Father     Hypertension Father     Breast Cancer Sister     Hypertension Daughter        Social History     Socioeconomic History    Marital status:      Spouse name: Not on file    Number of children: Not on file    Years of education: Not on file    Highest education level: Not on file   Tobacco Use    Smoking status: Current Every Day Smoker     Packs/day: 0.75     Years: 43.00     Pack years: 32.25     Types: Cigarettes     Last attempt to quit: 10/3/2016     Years since quittin.0    Smokeless tobacco: Never Used   Substance and Sexual Activity    Alcohol use: Yes     Alcohol/week: 14.0 standard drinks     Types: 14 Shots of liquor per week    Drug use: No    Sexual activity: Not Currently     Partners: Male     Social Determinants of Health     Financial Resource Strain:     Difficulty of Paying Living Expenses:    Food Insecurity:     Worried About Running Out of Food in the Last Year:     [address] in the Last Year:    Transportation Needs:     Lack of Transportation (Medical):  Lack of Transportation (Non-Medical):    Physical Activity:     Days of Exercise per Week:     Minutes of Exercise per Session:    Stress:     Feeling of Stress :    Social Connections:     Frequency of Communication with Friends and Family:     Frequency of Social Gatherings with Friends and Family:     Attends Worship Services:     Active Member of Clubs or Organizations:     Attends Club or Organization Meetings:     Marital Status:        Review of Systems   Constitutional: Negative for chills, fever, malaise/fatigue and weight loss. HENT: Negative for congestion, hearing loss, sore throat and tinnitus. Eyes: Negative for blurred vision, pain and discharge. Respiratory: Negative for cough, shortness of breath and wheezing. Cardiovascular: Negative for chest pain, palpitations, orthopnea, claudication and leg swelling.    Gastrointestinal: Negative for abdominal pain, constipation and heartburn. Genitourinary: Negative for dysuria, frequency and urgency. Musculoskeletal: Negative for falls, joint pain and myalgias. Skin: Negative for itching and rash. Neurological: Negative for dizziness, tingling, tremors and headaches. Endo/Heme/Allergies: Negative for environmental allergies and polydipsia. Psychiatric/Behavioral: Negative for depression and substance abuse. The patient is not nervous/anxious. Visit Vitals  /68 (BP 1 Location: Left upper arm)   Pulse 80   Temp 97 °F (36.1 °C) (Temporal)   Resp 18   Ht 5' 5\" (1.651 m)   Wt 128 lb 8 oz (58.3 kg)   SpO2 97%   BMI 21.38 kg/m²     Physical Exam  Vitals reviewed. Constitutional:       General: She is not in acute distress. Appearance: Normal appearance. She is normal weight. She is not ill-appearing. HENT:      Head: Normocephalic and atraumatic. Right Ear: Tympanic membrane, ear canal and external ear normal.      Left Ear: Tympanic membrane, ear canal and external ear normal.      Nose: Nose normal. No congestion or rhinorrhea. Mouth/Throat:      Mouth: Mucous membranes are moist.      Pharynx: No posterior oropharyngeal erythema. Eyes:      General:         Right eye: No discharge. Left eye: No discharge. Extraocular Movements: Extraocular movements intact. Conjunctiva/sclera: Conjunctivae normal.      Pupils: Pupils are equal, round, and reactive to light. Comments: Cornea anterior chamber and iris are normal.   Neck:      Trachea: No tracheal deviation. Cardiovascular:      Rate and Rhythm: Normal rate and regular rhythm. Pulses: Normal pulses. Heart sounds: Normal heart sounds. No murmur heard. No friction rub. No gallop. Pulmonary:      Effort: Pulmonary effort is normal. No respiratory distress. Breath sounds: Normal breath sounds. No wheezing or rhonchi. Chest:      Chest wall: No tenderness.    Abdominal: General: Bowel sounds are normal. There is no distension. Palpations: Abdomen is soft. There is no mass. Tenderness: There is no abdominal tenderness. There is no guarding or rebound. Genitourinary:     General: Normal vulva. Vagina: No vaginal discharge. Rectum: Normal.      Comments: Patient's external genitalia are normal without any visible or palpable lesions and under [place]'s glands and urethra and urethral meatus are all normal.  The patient's vulva is totally normal along with the introitus and this excepts a speculum with these however there is a considerable amount of atrophy about the cervix and there was a slight a bit of inflammation there which caused us to do a Pap smear to be certain everything was okay. The uterus was atrophied as expected and the adnexa were without any mass or tenderness. Rectovaginal exam was normal.  There is no blood in the stool. Musculoskeletal:         General: No tenderness or deformity. Cervical back: Normal range of motion and neck supple. Lymphadenopathy:      Cervical: No cervical adenopathy. Skin:     General: Skin is warm and dry. Coloration: Skin is not pale. Findings: No erythema or rash. Neurological:      General: No focal deficit present. Mental Status: She is alert. Cranial Nerves: No cranial nerve deficit. Motor: No abnormal muscle tone. Deep Tendon Reflexes: Reflexes are normal and symmetric. Reflexes normal.      Comments: Cranial nerves II through XII are intact sensory and motor. Biceps triceps knee and ankle DTRs are normal and symmetrical.           ICD-10-CM ICD-9-CM    1. Medicare annual wellness visit, subsequent  Z00.00 V70.0    2.  Essential hypertension  I10 401.9 amLODIPine (NORVASC) 5 mg tablet      losartan (COZAAR) 50 mg tablet      COLLECTION VENOUS BLOOD,VENIPUNCTURE      CBC WITH AUTOMATED DIFF      METABOLIC PANEL, COMPREHENSIVE      LIPID PANEL      LIPID PANEL METABOLIC PANEL, COMPREHENSIVE      CBC WITH AUTOMATED DIFF      CANCELED: CBC WITH AUTOMATED DIFF      CANCELED: METABOLIC PANEL, COMPREHENSIVE      CANCELED: LIPID PANEL   3. Grief at loss of child  F43.21 309.0 traZODone (DESYREL) 50 mg tablet    Z63.4     4. Mixed hyperlipidemia  E78.2 272.2 rosuvastatin (CRESTOR) 20 mg tablet   5. Personal history of tobacco use, presenting hazards to health  Z87.891 V15.82 CT LOW DOSE LUNG CANCER SCREENING   6. Needs flu shot  Z23 V04.81 FLU (FLUAD QUAD INFLUENZA VACCINE,QUAD,ADJUVANTED)   7. Pap smear for cervical cancer screening  Z12.4 V76.2 PAP (IMAGE GUIDED), LIQUID-BASED       Orders Placed This Encounter    Low Dose CT for Lung Cancer Screening     Standing Status:   Future     Standing Expiration Date:   11/5/2022     Order Specific Question:   Is this a low dose CT or a routine CT? Answer:   Low Dose CT [1]     Order Specific Question:   Reason for exam     Answer:   Lung Cancer Screening     Order Specific Question:   Smoking Status     Answer:   Current Every Day Smoker [1]     Order Specific Question:   Smoking history; number of pack-years (1 pack year = smoking 1 pack / day for 1 year; one pack = 20 cigarettes)     Answer:   1     Order Specific Question:   Please select Pack Year eligibility     Answer:   30 or more pack years: Meets USPSTF & CMS eligibility requirements     Order Specific Question:   The patient age is 49-80 years. **NOTE: If \"NO\" this study may not be covered by insurance. Answer:   Yes     Order Specific Question:   Please select AGE eligibility     Answer:   55-77: Meets USPSTF & CMS eligibility requirements     Order Specific Question:   Does the patient show any signs or symptoms of lung cancer? Answer:   No     Order Specific Question:   Is this the first (baseline) CT or an annual exam?     Answer: Annual [2]     Order Specific Question:   Is there documentation of shared decision making?      Answer:   Yes     Order Specific Question:   The patient was informed of the importance of adherence to annual screening, impact of comorbidities and ability/willingness to undergo diagnosis and treatment     Answer:   Yes     Order Specific Question:   The patient was informed of the importance of smoking cessation and/or maintaining smoking abstinence, including the offer of Medicare-covered tobacco cessation counseling services, if applicable     Answer: Yes    Influenza Vaccine, QUAD, 65 Yrs +  IM  (Fluad  )    CBC WITH AUTOMATED DIFF     Standing Status:   Future     Number of Occurrences:   1     Standing Expiration Date:       METABOLIC PANEL, COMPREHENSIVE     Standing Status:   Future     Number of Occurrences:   1     Standing Expiration Date:   2021    LIPID PANEL     Standing Status:   Future     Number of Occurrences:   1     Standing Expiration Date:   2021    COLLECTION VENOUS BLOOD,VENIPUNCTURE    amLODIPine (NORVASC) 5 mg tablet     Sig: Take 1 Tablet by mouth daily. Dispense:  90 Tablet     Refill:  1    losartan (COZAAR) 50 mg tablet     Sig: Take 1 Tablet by mouth daily. Dispense:  90 Tablet     Refill:  1    traZODone (DESYREL) 50 mg tablet     Si nightly     Dispense:  90 Tablet     Refill:  1    rosuvastatin (CRESTOR) 20 mg tablet     Sig: TAKE 1 TABLET BY MOUTH NIGHTLY     Dispense:  90 Tablet     Refill:  1    PAP (IMAGE GUIDED), LIQUID-BASED     Standing Status:   Future     Standing Expiration Date:   10/5/2022     Order Specific Question:   Pap Source? Answer:   Endocervical     Order Specific Question:   Total Hysterectomy? Answer:   No     Order Specific Question:   Supracervical Hysterectomy? Answer:   No     Order Specific Question:   Post Menopausal?     Answer:   Yes     Order Specific Question:   Hormone Therapy? Answer:   No     Order Specific Question:   IUD? Answer:   No     Order Specific Question:   Abnormal Bleeding?      Answer: No     Order Specific Question:   Pregnant     Answer:   No     Order Specific Question:   Post Partum? Answer:   No     Order Specific Question:   Pap collection method? Answer:   broom       Follow-up and Dispositions    · Return in about 6 months (around 4/5/2022), or if symptoms worsen or fail to improve. Saumya Carter MD          Assessment/Plan   Education and counseling provided:  Are appropriate based on today's review and evaluation  End-of-Life planning (with patient's consent)  Influenza Vaccine  Screening Pap and pelvic (covered once every 2 years)    1. Medicare annual wellness visit, subsequent  2. Essential hypertension  -     amLODIPine (NORVASC) 5 mg tablet; Take 1 Tablet by mouth daily. , Normal, Disp-90 Tablet, R-1  -     losartan (COZAAR) 50 mg tablet; Take 1 Tablet by mouth daily. , Normal, Disp-90 Tablet, R-1  -     COLLECTION VENOUS BLOOD,VENIPUNCTURE  -     CBC WITH AUTOMATED DIFF; Future  -     METABOLIC PANEL, COMPREHENSIVE; Future  -     LIPID PANEL; Future  3. Grief at loss of child  -     traZODone (DESYREL) 50 mg tablet; 1 nightly, Normal, Disp-90 Tablet, R-1  4. Mixed hyperlipidemia  -     rosuvastatin (CRESTOR) 20 mg tablet; TAKE 1 TABLET BY MOUTH NIGHTLY, Normal, Disp-90 Tablet, R-1  5. Personal history of tobacco use, presenting hazards to health  -     CT LOW DOSE LUNG CANCER SCREENING; Future  6. Needs flu shot  -     FLU (FLUAD QUAD INFLUENZA VACCINE,QUAD,ADJUVANTED)  7. Pap smear for cervical cancer screening  -     PAP (IMAGE GUIDED), LIQUID-BASED;  Future       Depression Risk Factor Screening     3 most recent PHQ Screens 10/5/2021   PHQ Not Done -   Little interest or pleasure in doing things Not at all   Feeling down, depressed, irritable, or hopeless Not at all   Total Score PHQ 2 0       Alcohol Risk Screen    Do you average more than 1 drink per night or more than 7 drinks a week:  Yes    On any one occasion in the past three months have you have had more than 3 drinks containing alcohol:  Yes        Functional Ability and Level of Safety    Hearing: Hearing is good. Activities of Daily Living: The home contains: no safety equipment. Patient does total self care       Functional Ability:   Does the patient exhibit a steady gait? yes   How long did it take the patient to get up and walk from a sitting position? 3sec   Is the patient self reliant?  (ie can do own laundry, meals, household chores)  yes     Does the patient handle his/her own medications? yes     Does the patient handle his/her own money? yes     Is the patients home safe (ie good lighting, handrails on stairs and bath, etc.)? yes     Did you notice or did patient express any hearing difficulties? no     Did you notice or did patient express any vision difficulties?   no     Were distance and reading eye charts used? no       Advance Care Planning:   Patient was offered the opportunity to discuss advance care planning:  yes     Does patient have an Advance Directive:  no   If no, did you provide information on Caring Connections? yes     ADL Assessment 10/5/2021   Feeding yourself No Help Needed   Getting from bed to chair No Help Needed   Getting dressed No Help Needed   Bathing or showering No Help Needed   Walk across the room (includes cane/walker) No Help Needed   Using the telphone No Help Needed   Taking your medications No Help Needed   Preparing meals No Help Needed   Managing money (expenses/bills) No Help Needed   Moderately strenuous housework (laundry) No Help Needed   Shopping for personal items (toiletries/medicines) No Help Needed   Shopping for groceries No Help Needed   Driving No Help Needed   Climbing a flight of stairs No Help Needed   Getting to places beyond walking distances No Help Needed       Abuse Screening Questionnaire 10/5/2021   Do you ever feel afraid of your partner? N   Are you in a relationship with someone who physically or mentally threatens you?  Josh Bueno Is it safe for you to go home? Y        Ambulation: with no difficulty     Fall Risk:  Fall Risk Assessment, last 12 mths 10/5/2021   Able to walk? Yes   Fall in past 12 months? 0   Do you feel unsteady? 0   Are you worried about falling 0   Number of falls in past 12 months -   Fall with injury?  -      Abuse Screen:  Patient is not abused       Cognitive Screening    Has your family/caregiver stated any concerns about your memory: no     Cognitive Screening: Normal - Verbal Fluency Test    Health Maintenance Due     Health Maintenance Due   Topic Date Due    Low dose CT lung screening  Never done       Patient Care Team   Patient Care Team:  Shelbi Cisneros MD as PCP - General (Family Medicine)  Rusty Cyr MD as PCP - [facility] Empaneled Provider    History     Patient Active Problem List   Diagnosis Code    Pure hypercholesterolemia E78.00    Post-menopausal Z78.0    Hx of screening mammography Z92.89    Pap smear for cervical cancer screening Z12.4    Screening for osteoporosis Z13.820    Osteopenia M85.80    Skin cancer C44.90    Pneumonia, organism unspecified(486) J18.9    Hx of iron deficiency anemia Z86.2    Heart murmur R01.1    FHx: breast cancer in first degree relative Z80.3    Oral dysplasia, 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 Andrea Clifton Osteopenia 7/2013    Pneumonia 3/21/14    Tobacco dependence     quit 3/19/14, restarted 5/2014      Past Surgical History:   Procedure Laterality Date    ENDOSCOPY, COLON, DIAGNOSTIC  2010, 5/23/14    colon polyps seen on both studies, repeat every 5 years (Dr. Clifford Townsend)    HX CHOLECYSTECTOMY      HX GYN      tubal ligation    HX ORTHOPAEDIC  1995    foot bunion removal, mortons neuroma; [facility]    HX OTHER SURGICAL      MOHS, [facility] (Dr. Forrestine Seip)     Current Outpatient Medications   Medication Sig Dispense Refill    amLODIPine (NORVASC) 5 mg tablet Take 1 Tablet by mouth daily. 90 Tablet 1    losartan (COZAAR) 50 mg tablet Take 1 Tablet by mouth daily. 90 Tablet 1    traZODone (DESYREL) 50 mg tablet 1 nightly 90 Tablet 1    rosuvastatin (CRESTOR) 20 mg tablet TAKE 1 TABLET BY MOUTH NIGHTLY 90 Tablet 1    naproxen (NAPROSYN) 500 mg tablet TAKE 1 TABLET BY MOUTH TWICE A DAY WITH MEALS 180 Tablet 0    cream base no. 157 (Q-DERM) Take 15 mg by mouth daily.  CALCIUM-MAGNESIUM-ZINC PO Take  by mouth.  Cetirizine (ZYRTEC) 10 mg cap Take  by mouth.  multivit-minerals/folic acid (CENTRUM VITAMINTS PO) Take  by mouth.  omega-3 fatty acids-vitamin e (FISH OIL) 1,000 mg cap Take 1 Cap by mouth two (2) times a day.  loratadine (CLARITIN) 10 mg tablet Take 1 Tab by mouth daily. (Patient taking differently: Take 10 mg by mouth daily as needed.) 1 Tab 0    triamcinolone (NASACORT AQ) 55 mcg nasal inhaler 2 Sprays by Both Nostrils route daily as needed.  calcium-cholecalciferol, D3, (CALTRATE 600+D) tablet Take 1 Tab by mouth two (2) times a day.  aspirin delayed-release 81 mg tablet Take  by mouth daily.          Allergies   Allergen Reactions    Ace Inhibitors Cough    Lipitor [Atorvastatin] Other (comments)     Elevated CK       Family History   Problem Relation Age of Onset   Kishore Travis Arthritis-osteo Mother     Heart Disease Mother     Heart Attack Mother         72    Osteoporosis Mother     Hypertension Mother     Stroke Father     Heart Disease Father     Hypertension Father     Breast Cancer Sister     Hypertension Daughter      Social History Tobacco Use    Smoking status: Current Every Day Smoker     Packs/day: 0.75     Years: 43.00     Pack years: 32.25     Types: Cigarettes     Last attempt to quit: 10/3/2016     Years since quittin.0    Smokeless tobacco: Never Used   Substance Use Topics    Alcohol use: Yes     Alcohol/week: 14.0 standard drinks     Types: 14 Shots of liquor per week         Cornelius Andino LPN   Discussed with patient current guidelines for screening for lung cancer. Current recommendations are to obtain yearly screening LDCT yearly for age 46-80, or until smoke free for 15 years. Patient has 40 pack year history of cigarette smoking and currently active smoker. Discussed with patient risks and benefits of screening, including over-diagnosis, false positive rate, and total radiation exposure. Patient currently exhibits no signs or symptoms suggestive of lung cancer. Discussed with patient importance of compliance with yearly annual lung cancer screenings and willingness to undergo diagnosis and treatment if screening scan is positive. In addition, patient was counseled regarding (remaining smoke free/total smoking cessation).   Zhao Meneses MD

## 2021-10-05 NOTE — PROGRESS NOTES
Jong Wakefield is a 73 y.o. female who presents with the following:  Chief Complaint   Patient presents with    Annual Wellness Visit    Hypertension    Cholesterol Problem    Depression     Grief at loss of child       Patient's hypertension is doing well on current medication without chest pain shortness of breath nor edema. The patient's hypercholesterolemia is doing well on her current rosuvastatin without any muscle pain or weakness. The patient's grief and depression is doing much better on her current trazodone and she is sleeping through the night most nights without any increased dose. Allergies   Allergen Reactions    Ace Inhibitors Cough    Lipitor [Atorvastatin] Other (comments)     Elevated CK       Current Outpatient Medications   Medication Sig    amLODIPine (NORVASC) 5 mg tablet Take 1 Tablet by mouth daily.  losartan (COZAAR) 50 mg tablet Take 1 Tablet by mouth daily.  traZODone (DESYREL) 50 mg tablet 1 nightly    rosuvastatin (CRESTOR) 20 mg tablet TAKE 1 TABLET BY MOUTH NIGHTLY    naproxen (NAPROSYN) 500 mg tablet TAKE 1 TABLET BY MOUTH TWICE A DAY WITH MEALS    cream base no. 157 (Q-DERM) Take 15 mg by mouth daily.  CALCIUM-MAGNESIUM-ZINC PO Take  by mouth.  Cetirizine (ZYRTEC) 10 mg cap Take  by mouth.  multivit-minerals/folic acid (CENTRUM VITAMINTS PO) Take  by mouth.  omega-3 fatty acids-vitamin e (FISH OIL) 1,000 mg cap Take 1 Cap by mouth two (2) times a day.  loratadine (CLARITIN) 10 mg tablet Take 1 Tab by mouth daily. (Patient taking differently: Take 10 mg by mouth daily as needed.)    triamcinolone (NASACORT AQ) 55 mcg nasal inhaler 2 Sprays by Both Nostrils route daily as needed.  calcium-cholecalciferol, D3, (CALTRATE 600+D) tablet Take 1 Tab by mouth two (2) times a day.  aspirin delayed-release 81 mg tablet Take  by mouth daily. No current facility-administered medications for this visit.        Past Medical History:   Diagnosis Date  Basal cell carcinoma of skin     Colon polyps     Elevated CK 3/25/15    mild    Heart murmur 16    echo showed trivial MR and trivial TR    Hemorrhoids     per Dr. Olman Leija Hyperlipidemia LDL goal < 100     Hypertension     Oral dysplasia, acquired 2016    Dr. Mar Harris Osteopenia 2013    Pneumonia 3/21/14    Tobacco dependence     quit 3/19/14, restarted 2014       Past Surgical History:   Procedure Laterality Date    ENDOSCOPY, COLON, DIAGNOSTIC  , 14    colon polyps seen on both studies, repeat every 5 years (Dr. Ronak Alonso)    HX CHOLECYSTECTOMY      HX GYN      tubal ligation    HX ORTHOPAEDIC      foot bunion removal, mortons neuroma; Launie Grime    HX OTHER SURGICAL      MOHS, BCC (Dr. Ankit Elizabeth)       Family History   Problem Relation Age of Onset    Arthritis-osteo Mother     Heart Disease Mother     Heart Attack Mother         72    Osteoporosis Mother     Hypertension Mother     Stroke Father     Heart Disease Father     Hypertension Father     Breast Cancer Sister     Hypertension Daughter        Social History     Socioeconomic History    Marital status:      Spouse name: Not on file    Number of children: Not on file    Years of education: Not on file    Highest education level: Not on file   Tobacco Use    Smoking status: Current Every Day Smoker     Packs/day: 0.75     Years: 43.00     Pack years: 32.25     Types: Cigarettes     Last attempt to quit: 10/3/2016     Years since quittin.0    Smokeless tobacco: Never Used   Substance and Sexual Activity    Alcohol use:  Yes     Alcohol/week: 14.0 standard drinks     Types: 14 Shots of liquor per week    Drug use: No    Sexual activity: Not Currently     Partners: Male     Social Determinants of Health     Financial Resource Strain:     Difficulty of Paying Living Expenses:    Food Insecurity:     Worried About Running Out of Food in the Last Year:     [address] in the Last Year:    Transportation Needs:     Lack of Transportation (Medical):  Lack of Transportation (Non-Medical):    Physical Activity:     Days of Exercise per Week:     Minutes of Exercise per Session:    Stress:     Feeling of Stress :    Social Connections:     Frequency of Communication with Friends and Family:     Frequency of Social Gatherings with Friends and Family:     Attends Hindu Services:     Active Member of Clubs or Organizations:     Attends Club or Organization Meetings:     Marital Status:        Review of Systems   Constitutional: Negative for chills, fever, malaise/fatigue and weight loss. HENT: Negative for congestion, hearing loss, sore throat and tinnitus. Eyes: Negative for blurred vision, pain and discharge. Respiratory: Negative for cough, shortness of breath and wheezing. Cardiovascular: Negative for chest pain, palpitations, orthopnea, claudication and leg swelling. Gastrointestinal: Negative for abdominal pain, constipation and heartburn. Genitourinary: Negative for dysuria, frequency and urgency. Musculoskeletal: Negative for falls, joint pain and myalgias. Skin: Negative for itching and rash. Neurological: Negative for dizziness, tingling, tremors and headaches. Endo/Heme/Allergies: Negative for environmental allergies and polydipsia. Psychiatric/Behavioral: Negative for depression and substance abuse. The patient is not nervous/anxious. Visit Vitals  /68 (BP 1 Location: Left upper arm)   Pulse 80   Temp 97 °F (36.1 °C) (Temporal)   Resp 18   Ht 5' 5\" (1.651 m)   Wt 128 lb 8 oz (58.3 kg)   SpO2 97%   BMI 21.38 kg/m²     Physical Exam  Constitutional:       Appearance: Normal appearance. HENT:      Head: Normocephalic and atraumatic. Right Ear: Tympanic membrane, ear canal and external ear normal.      Left Ear: Tympanic membrane, ear canal and external ear normal.      Nose: Nose normal. No congestion or rhinorrhea. Mouth/Throat:      Mouth: Mucous membranes are moist.      Pharynx: No posterior oropharyngeal erythema. Eyes:      General:         Right eye: No discharge. Left eye: No discharge. Extraocular Movements: Extraocular movements intact. Conjunctiva/sclera: Conjunctivae normal.      Pupils: Pupils are equal, round, and reactive to light. Comments: Cornea anterior chamber and iris are normal.   Neck:      Trachea: No tracheal deviation. Cardiovascular:      Rate and Rhythm: Normal rate and regular rhythm. Pulses: Normal pulses. Heart sounds: Normal heart sounds. No murmur heard. No friction rub. No gallop. Pulmonary:      Effort: Pulmonary effort is normal. No respiratory distress. Breath sounds: Normal breath sounds. No wheezing or rhonchi. Chest:      Chest wall: No tenderness. Abdominal:      General: Bowel sounds are normal. There is no distension. Palpations: Abdomen is soft. There is no mass. Tenderness: There is no abdominal tenderness. There is no guarding or rebound. Genitourinary:     General: Normal vulva. Vagina: No vaginal discharge. Rectum: Normal.      Comments: External genitalia normal as is the introitus and the [address]'s glands and urethral meatus and the urethra and the bladder. The vagina was somewhat atrophy but more so about the cervix so it was contracted about the cervix and there was some inflammation about the area causing concern so a Pap smear was done. The patient's uterus was atrophied attics as expected and her adnexa are without masses or tenderness. Rectovaginal exam was normal and stool was without blood. Musculoskeletal:         General: No tenderness or deformity. Cervical back: Normal range of motion and neck supple. Lymphadenopathy:      Cervical: No cervical adenopathy. Skin:     General: Skin is warm and dry. Coloration: Skin is not pale. Findings: No erythema or rash.    Neurological: General: No focal deficit present. Mental Status: She is alert. Cranial Nerves: No cranial nerve deficit. Motor: No abnormal muscle tone. Deep Tendon Reflexes: Reflexes are normal and symmetric. Reflexes normal.      Comments: Cranial nerves II through XII are intact sensory and motor. Biceps triceps knee and ankle DTRs are normal and symmetrical.           ICD-10-CM ICD-9-CM    1. Medicare annual wellness visit, subsequent  Z00.00 V70.0    2. Essential hypertension  I10 401.9 amLODIPine (NORVASC) 5 mg tablet      losartan (COZAAR) 50 mg tablet      COLLECTION VENOUS BLOOD,VENIPUNCTURE      CBC WITH AUTOMATED DIFF      METABOLIC PANEL, COMPREHENSIVE      LIPID PANEL      LIPID PANEL      METABOLIC PANEL, COMPREHENSIVE      CBC WITH AUTOMATED DIFF      CANCELED: CBC WITH AUTOMATED DIFF      CANCELED: METABOLIC PANEL, COMPREHENSIVE      CANCELED: LIPID PANEL   3. Grief at loss of child  F43.21 309.0 traZODone (DESYREL) 50 mg tablet    Z63.4     4. Mixed hyperlipidemia  E78.2 272.2 rosuvastatin (CRESTOR) 20 mg tablet   5. Personal history of tobacco use, presenting hazards to health  Z87.891 V15.82 CT LOW DOSE LUNG CANCER SCREENING   6. Needs flu shot  Z23 V04.81 FLU (FLUAD QUAD INFLUENZA VACCINE,QUAD,ADJUVANTED)   7. Pap smear for cervical cancer screening  Z12.4 V76.2 PAP (IMAGE GUIDED), LIQUID-BASED       Orders Placed This Encounter    Low Dose CT for Lung Cancer Screening     Standing Status:   Future     Standing Expiration Date:   11/5/2022     Order Specific Question:   Is this a low dose CT or a routine CT?      Answer:   Low Dose CT [1]     Order Specific Question:   Reason for exam     Answer:   Lung Cancer Screening     Order Specific Question:   Smoking Status     Answer:   Current Every Day Smoker [1]     Order Specific Question:   Smoking history; number of pack-years (1 pack year = smoking 1 pack / day for 1 year; one pack = 20 cigarettes)     Answer:   1     Order Specific Question:   Please select Pack Year eligibility     Answer:   30 or more pack years: Meets USPSTF & CMS eligibility requirements     Order Specific Question:   The patient age is 49-80 years. **NOTE: If \"NO\" this study may not be covered by insurance. Answer:   Yes     Order Specific Question:   Please select AGE eligibility     Answer:   55-77: Meets USPSTF & CMS eligibility requirements     Order Specific Question:   Does the patient show any signs or symptoms of lung cancer? Answer:   No     Order Specific Question:   Is this the first (baseline) CT or an annual exam?     Answer: Annual [2]     Order Specific Question:   Is there documentation of shared decision making? Answer:   Yes     Order Specific Question:   The patient was informed of the importance of adherence to annual screening, impact of comorbidities and ability/willingness to undergo diagnosis and treatment     Answer:   Yes     Order Specific Question:   The patient was informed of the importance of smoking cessation and/or maintaining smoking abstinence, including the offer of Medicare-covered tobacco cessation counseling services, if applicable     Answer: Yes    Influenza Vaccine, QUAD, 65 Yrs +  IM  (Fluad 87797 )    CBC WITH AUTOMATED DIFF     Standing Status:   Future     Number of Occurrences:   1     Standing Expiration Date:       METABOLIC PANEL, COMPREHENSIVE     Standing Status:   Future     Number of Occurrences:   1     Standing Expiration Date:   2021    LIPID PANEL     Standing Status:   Future     Number of Occurrences:   1     Standing Expiration Date:   2021    COLLECTION VENOUS BLOOD,VENIPUNCTURE    amLODIPine (NORVASC) 5 mg tablet     Sig: Take 1 Tablet by mouth daily. Dispense:  90 Tablet     Refill:  1    losartan (COZAAR) 50 mg tablet     Sig: Take 1 Tablet by mouth daily.      Dispense:  90 Tablet     Refill:  1    traZODone (DESYREL) 50 mg tablet     Si nightly Dispense:  90 Tablet     Refill:  1    rosuvastatin (CRESTOR) 20 mg tablet     Sig: TAKE 1 TABLET BY MOUTH NIGHTLY     Dispense:  90 Tablet     Refill:  1    PAP (IMAGE GUIDED), LIQUID-BASED     Standing Status:   Future     Standing Expiration Date:   10/5/2022     Order Specific Question:   Pap Source? Answer:   Endocervical     Order Specific Question:   Total Hysterectomy? Answer:   No     Order Specific Question:   Supracervical Hysterectomy? Answer:   No     Order Specific Question:   Post Menopausal?     Answer:   Yes     Order Specific Question:   Hormone Therapy? Answer:   No     Order Specific Question:   IUD? Answer:   No     Order Specific Question:   Abnormal Bleeding? Answer:   No     Order Specific Question:   Pregnant     Answer:   No     Order Specific Question:   Post Partum? Answer:   No     Order Specific Question:   Pap collection method? Answer:   broom       Follow-up and Dispositions    · Return in about 6 months (around 4/5/2022), or if symptoms worsen or fail to improve.          Malu Faustin MD

## 2021-10-05 NOTE — PATIENT INSTRUCTIONS
Medicare Wellness Visit, Female     The best way to live healthy is to have a lifestyle where you eat a well-balanced diet, exercise regularly, limit alcohol use, and quit all forms of tobacco/nicotine, if applicable. Regular preventive services are another way to keep healthy. Preventive services (vaccines, screening tests, monitoring & exams) can help personalize your care plan, which helps you manage your own care. Screening tests can find health problems at the earliest stages, when they are easiest to treat. [facility] follows the current, evidence-based guidelines published by the [facility] ([facility]) when recommending preventive services for our patients. Because we follow these guidelines, sometimes recommendations change over time as research supports it. (For example, mammograms used to be recommended annually. Even though Medicare will still pay for an annual mammogram, the newer guidelines recommend a mammogram every two years for women of average risk). Of course, you and your doctor may decide to screen more often for some diseases, based on your risk and your co-morbidities (chronic disease you are already diagnosed with). Preventive services for you include:  - Medicare offers their members a free annual wellness visit, which is time for you and your primary care provider to discuss and plan for your preventive service needs. Take advantage of this benefit every year!  -All adults over the age of 72 should receive the recommended pneumonia vaccines. Current USPSTF guidelines recommend a series of two vaccines for the best pneumonia protection.   -All adults should have a flu vaccine yearly and a tetanus vaccine every 10 years.   -All adults age 48 and older should receive the shingles vaccines (series of two vaccines).       -All adults age 38-68 who are overweight should have a diabetes screening test once every three years.   -All adults born between 80 and 1965 should be screened once for Hepatitis C.  -Other screening tests and preventive services for persons with diabetes include: an eye exam to screen for diabetic retinopathy, a kidney function test, a foot exam, and stricter control over your cholesterol.   -Cardiovascular screening for adults with routine risk involves an electrocardiogram (ECG) at intervals determined by your doctor.   -Colorectal cancer screenings should be done for adults age 54-65 with no increased risk factors for colorectal cancer. There are a number of acceptable methods of screening for this type of cancer. Each test has its own benefits and drawbacks. Discuss with your doctor what is most appropriate for you during your annual wellness visit. The different tests include: colonoscopy (considered the best screening method), a fecal occult blood test, a fecal DNA test, and sigmoidoscopy.    -A bone mass density test is recommended when a woman turns 65 to screen for osteoporosis. This test is only recommended one time, as a screening. Some providers will use this same test as a disease monitoring tool if you already have osteoporosis. -Breast cancer screenings are recommended every other year for women of normal risk, age 54-69.  -Cervical cancer screenings for women over age 72 are only recommended with certain risk factors. Here is a list of your current Health Maintenance items (your personalized list of preventive services) with a due date:  Health Maintenance Due   Topic Date Due    Smoker or Former Smoker - Annual Lung Cancer Screen  Never done    COVID-19 Vaccine (2 - Moderna 2-dose series) 04/06/2021    Yearly Flu Vaccine (1) 09/01/2021         Vaccine Information Statement    Influenza (Flu) Vaccine (Inactivated or Recombinant): What You Need to Know    Many vaccine information statements are available in [place] and other languages. See www.immunize.org/vis.   Hojas de información sobre vacunas están disponibles en español y en muchos otros idiomas. Visite www.immunize.org/vis. 1. Why get vaccinated? Influenza vaccine can prevent influenza (flu). Flu is a contagious disease that spreads around the [place] every year, usually between October and May. Anyone can get the flu, but it is more dangerous for some people. Infants and young children, people 72 years and older, pregnant people, and people with certain health conditions or a weakened immune system are at greatest risk of flu complications. Pneumonia, bronchitis, sinus infections, and ear infections are examples of flu-related complications. If you have a medical condition, such as heart disease, cancer, or diabetes, flu can make it worse. Flu can cause fever and chills, sore throat, muscle aches, fatigue, cough, headache, and runny or stuffy nose. Some people may have vomiting and diarrhea, though this is more common in children than adults. In an average year, thousands of people in the [facility] die from flu, and many more are hospitalized. Flu vaccine prevents millions of illnesses and flu-related visits to the doctor each year. 2. Influenza vaccines     CDC recommends everyone 6 months and older get vaccinated every flu season. Children 6 months through 6years of age may need 2 doses during a single flu season. Everyone else needs only 1 dose each flu season. It takes about 2 weeks for protection to develop after vaccination. There are many flu viruses, and they are always changing. Each year a new flu vaccine is made to protect against the influenza viruses believed to be likely to cause disease in the upcoming flu season. Even when the vaccine doesnt exactly match these viruses, it may still provide some protection. Influenza vaccine does not cause flu. Influenza vaccine may be given at the same time as other vaccines.     3. Talk with your health care provider    Tell your vaccination provider if the person getting the vaccine:   Has had an allergic reaction after a previous dose of influenza vaccine, or has any severe, life-threatening allergies    Has ever had Guillain-Barré Syndrome (also called GBS)    In some cases, your health care provider may decide to postpone influenza vaccination until a future visit. Influenza vaccine can be administered at any time during pregnancy. People who are or will be pregnant during influenza season should receive inactivated influenza vaccine. People with minor illnesses, such as a cold, may be vaccinated. People who are moderately or severely ill should usually wait until they recover before getting influenza vaccine. Your health care provider can give you more information. 4. Risks of a vaccine reaction     Soreness, redness, and swelling where the shot is given, fever, muscle aches, and headache can happen after influenza vaccination.  There may be a very small increased risk of Guillain-Barré Syndrome (GBS) after inactivated influenza vaccine (the flu shot). [place] children who get the flu shot along with pneumococcal vaccine (PCV13) and/or DTaP vaccine at the same time might be slightly more likely to have a seizure caused by fever. Tell your health care provider if a child who is getting flu vaccine has ever had a seizure. People sometimes faint after medical procedures, including vaccination. Tell your provider if you feel dizzy or have vision changes or ringing in the ears. As with any medicine, there is a very remote chance of a vaccine causing a severe allergic reaction, other serious injury, or death. 5. What if there is a serious problem? An allergic reaction could occur after the vaccinated person leaves the clinic.  If you see signs of a severe allergic reaction (hives, swelling of the face and throat, difficulty breathing, a fast heartbeat, dizziness, or weakness), call 9-1-1 and get the person to the nearest hospital.    For other signs that concern you, call your health care provider. Adverse reactions should be reported to the Vaccine Adverse Event Reporting System (VAERS). Your health care provider will usually file this report, or you can do it yourself. Visit the VAERS website at www.vaers. [facility].gov or call 1-631.613.6000. VAERS is only for reporting reactions, and VAERS staff members do not give medical advice. 6. The National Vaccine Injury Compensation Program    The [facility] Vaccine Injury Compensation Program (VICP) is a federal program that was created to compensate people who may have been injured by certain vaccines. Claims regarding alleged injury or death due to vaccination have a time limit for filing, which may be as short as two years. Visit the VICP website at www.[facility].gov/vaccinecompensation or call 0-273.580.3927 to learn about the program and about filing a claim. 7. How can I learn more?  Ask your health care provider.  Call your local or state health department.  Visit the website of the Food and Drug Administration (FDA) for vaccine package inserts and additional information at www.fda.gov/vaccines-blood-biologics/vaccines.  Contact the Centers for Disease Control and Prevention (CDC):  - Call 2-162.880.3361 (1-800-CDC-INFO) or  - Visit CDCs influenza website at www.cdc.gov/flu. Vaccine Information Statement   Inactivated Influenza Vaccine   8/6/2021  42 RICH Frank [id number]   Department of Health and Human Services  Centers for Disease Control and Prevention    Office Use Only

## 2021-10-06 LAB
ALBUMIN SERPL-MCNC: 3.8 G/DL (ref 3.5–5)
ALBUMIN/GLOB SERPL: 1.2 {RATIO} (ref 1.1–2.2)
ALP SERPL-CCNC: 68 U/L (ref 45–117)
ALT SERPL-CCNC: 29 U/L (ref 12–78)
ANION GAP SERPL CALC-SCNC: 6 MMOL/L (ref 5–15)
AST SERPL-CCNC: 23 U/L (ref 15–37)
BASOPHILS # BLD: 0.1 K/UL (ref 0–0.1)
BASOPHILS NFR BLD: 1 % (ref 0–1)
BILIRUB SERPL-MCNC: 0.4 MG/DL (ref 0.2–1)
BUN SERPL-MCNC: 12 MG/DL (ref 6–20)
BUN/CREAT SERPL: 19 (ref 12–20)
CALCIUM SERPL-MCNC: 9.4 MG/DL (ref 8.5–10.1)
CHLORIDE SERPL-SCNC: 106 MMOL/L (ref 97–108)
CHOLEST SERPL-MCNC: 176 MG/DL
CO2 SERPL-SCNC: 28 MMOL/L (ref 21–32)
CREAT SERPL-MCNC: 0.63 MG/DL (ref 0.55–1.02)
DIFFERENTIAL METHOD BLD: ABNORMAL
EOSINOPHIL # BLD: 0.1 K/UL (ref 0–0.4)
EOSINOPHIL NFR BLD: 1 % (ref 0–7)
ERYTHROCYTE [DISTWIDTH] IN BLOOD BY AUTOMATED COUNT: 13.3 % (ref 11.5–14.5)
GLOBULIN SER CALC-MCNC: 3.3 G/DL (ref 2–4)
GLUCOSE SERPL-MCNC: 88 MG/DL (ref 65–100)
HCT VFR BLD AUTO: 43.4 % (ref 35–47)
HDLC SERPL-MCNC: 60 MG/DL
HDLC SERPL: 2.9 {RATIO} (ref 0–5)
HGB BLD-MCNC: 13.9 G/DL (ref 11.5–16)
IMM GRANULOCYTES # BLD AUTO: 0 K/UL (ref 0–0.04)
IMM GRANULOCYTES NFR BLD AUTO: 0 % (ref 0–0.5)
LDLC SERPL CALC-MCNC: 89 MG/DL (ref 0–100)
LYMPHOCYTES # BLD: 3.8 K/UL (ref 0.8–3.5)
LYMPHOCYTES NFR BLD: 31 % (ref 12–49)
MCH RBC QN AUTO: 30.1 PG (ref 26–34)
MCHC RBC AUTO-ENTMCNC: 32 G/DL (ref 30–36.5)
MCV RBC AUTO: 93.9 FL (ref 80–99)
MONOCYTES # BLD: 0.9 K/UL (ref 0–1)
MONOCYTES NFR BLD: 8 % (ref 5–13)
NEUTS SEG # BLD: 7.4 K/UL (ref 1.8–8)
NEUTS SEG NFR BLD: 59 % (ref 32–75)
NRBC # BLD: 0 K/UL (ref 0–0.01)
NRBC BLD-RTO: 0 PER 100 WBC
PLATELET # BLD AUTO: 302 K/UL (ref 150–400)
PMV BLD AUTO: 10.2 FL (ref 8.9–12.9)
POTASSIUM SERPL-SCNC: 3.8 MMOL/L (ref 3.5–5.1)
PROT SERPL-MCNC: 7.1 G/DL (ref 6.4–8.2)
RBC # BLD AUTO: 4.62 M/UL (ref 3.8–5.2)
SODIUM SERPL-SCNC: 140 MMOL/L (ref 136–145)
TRIGL SERPL-MCNC: 135 MG/DL (ref ?–150)
VLDLC SERPL CALC-MCNC: 27 MG/DL
WBC # BLD AUTO: 12.3 K/UL (ref 3.6–11)

## 2021-10-06 NOTE — PROGRESS NOTES
[employer] message sent to pt RLQ abdominal pain for 1 day, sent in by Urgent care center, denies N/V/D.

## 2021-10-27 ENCOUNTER — HOSPITAL ENCOUNTER (OUTPATIENT)
Dept: CT IMAGING | Age: 71
Discharge: HOME | End: 2021-10-27
Attending: FAMILY MEDICINE
Payer: MEDICARE

## 2021-10-27 DIAGNOSIS — Z87.891 PERSONAL HISTORY OF TOBACCO USE, PRESENTING HAZARDS TO HEALTH: ICD-10-CM

## 2021-10-27 PROCEDURE — 71271 CT THORAX LUNG CANCER SCR C-: CPT

## 2021-11-01 ENCOUNTER — TRANSCRIBE ORDER (OUTPATIENT)
Dept: SCHEDULING | Age: 71
End: 2021-11-01

## 2021-11-01 DIAGNOSIS — Z12.31 VISIT FOR SCREENING MAMMOGRAM: Primary | ICD-10-CM

## 2021-12-02 ENCOUNTER — HOSPITAL ENCOUNTER (OUTPATIENT)
Dept: MAMMOGRAPHY | Age: 71
Discharge: HOME | End: 2021-12-02
Attending: FAMILY MEDICINE
Payer: MEDICARE

## 2021-12-02 DIAGNOSIS — Z12.31 VISIT FOR SCREENING MAMMOGRAM: ICD-10-CM

## 2021-12-02 PROCEDURE — 77063 BREAST TOMOSYNTHESIS BI: CPT

## 2021-12-11 DIAGNOSIS — M16.11 PRIMARY OSTEOARTHRITIS OF RIGHT HIP: ICD-10-CM

## 2021-12-13 RX ORDER — NAPROXEN 500 MG/1
TABLET ORAL
Qty: 180 TABLET | Refills: 0 | Status: SHIPPED | OUTPATIENT
Start: 2021-12-13

## 2022-03-18 PROBLEM — Z87.891 FORMER TOBACCO USE: Status: ACTIVE | Noted: 2017-03-09

## 2022-03-19 PROBLEM — M16.11 PRIMARY OSTEOARTHRITIS OF RIGHT HIP: Status: ACTIVE | Noted: 2021-03-19

## 2022-03-19 PROBLEM — Z63.4 GRIEF AT LOSS OF CHILD: Status: ACTIVE | Noted: 2019-03-29

## 2022-03-19 PROBLEM — F43.21 GRIEF AT LOSS OF CHILD: Status: ACTIVE | Noted: 2019-03-29

## 2022-03-29 ENCOUNTER — VIRTUAL VISIT (OUTPATIENT)
Dept: FAMILY MEDICINE CLINIC | Age: 72
End: 2022-03-29
Payer: MEDICARE

## 2022-03-29 DIAGNOSIS — I10 ESSENTIAL HYPERTENSION: Primary | ICD-10-CM

## 2022-03-29 DIAGNOSIS — Z63.4 GRIEF AT LOSS OF CHILD: ICD-10-CM

## 2022-03-29 DIAGNOSIS — M16.11 PRIMARY OSTEOARTHRITIS OF RIGHT HIP: ICD-10-CM

## 2022-03-29 DIAGNOSIS — F43.21 GRIEF AT LOSS OF CHILD: ICD-10-CM

## 2022-03-29 DIAGNOSIS — E78.2 MIXED HYPERLIPIDEMIA: ICD-10-CM

## 2022-03-29 PROCEDURE — G2025 DIS SITE TELE SVCS RHC/FQHC: HCPCS | Performed by: FAMILY MEDICINE

## 2022-03-29 RX ORDER — ROSUVASTATIN CALCIUM 20 MG/1
TABLET, COATED ORAL
Qty: 90 TABLET | Refills: 1 | Status: SHIPPED | OUTPATIENT
Start: 2022-03-29 | End: 2022-11-01

## 2022-03-29 RX ORDER — AMLODIPINE BESYLATE 5 MG/1
5 TABLET ORAL DAILY
Qty: 90 TABLET | Refills: 1 | Status: SHIPPED | OUTPATIENT
Start: 2022-03-29 | End: 2022-09-29

## 2022-03-29 RX ORDER — TRAZODONE HYDROCHLORIDE 100 MG/1
TABLET ORAL
Qty: 90 TABLET | Refills: 1 | Status: SHIPPED | OUTPATIENT
Start: 2022-03-29 | End: 2022-09-29

## 2022-03-29 RX ORDER — LOSARTAN POTASSIUM 50 MG/1
50 TABLET ORAL DAILY
Qty: 90 TABLET | Refills: 1 | Status: SHIPPED | OUTPATIENT
Start: 2022-03-29 | End: 2022-09-29

## 2022-03-29 SDOH — SOCIAL STABILITY - SOCIAL INSECURITY: DISSAPEARANCE AND DEATH OF FAMILY MEMBER: Z63.4

## 2022-03-29 NOTE — PROGRESS NOTES
1. \"Have you been to the ER, urgent care clinic since your last visit? Hospitalized since your last visit? \" No    2. \"Have you seen or consulted any other health care providers outside of the [address] [place] [address] since your last visit? \" No     3. For patients aged 39-70: Has the patient had a colonoscopy / FIT/ Cologuard? Yes - no Care Gap present      If the patient is female:    4. For patients aged 41-77: Has the patient had a mammogram within the past 2 years? Yes - no Care Gap present      5. For patients aged 21-65: Has the patient had a pap smear?  NA - based on age or sex

## 2022-03-29 NOTE — PROGRESS NOTES
Cynthia Hsu is a 81 y.o. female, evaluated via audio-only technology on 3/29/2022 for Cholesterol Problem  . Assessment & Plan:   Diagnoses and all orders for this visit:    1. Benign essential hypertension    2. Essential hypertension  -     amLODIPine (NORVASC) 5 mg tablet; Take 1 Tablet by mouth daily. -     losartan (COZAAR) 50 mg tablet; Take 1 Tablet by mouth daily. 3. Grief at loss of child  -     traZODone (DESYREL) 100 mg tablet; 1 nightly    4. Mixed hyperlipidemia  -     rosuvastatin (CRESTOR) 20 mg tablet; TAKE 1 TABLET BY MOUTH NIGHTLY    5. Primary osteoarthritis of right hip    6. Pure hypercholesterolemia        12  Subjective:   Patient's essential hypertension is doing well on current medications without chest pain shortness of breath nor edema. Patient is still grieving the loss of a child and does occasionally have difficulty sleeping on the 50 mg trazodone and is requested that we go up to 100 mg nightly. The patient's mixed hyperlipidemia is doing well on rosuvastatin without any muscle pain or weakness the patient has primary osteoarthritis of right hip for which she uses naproxen which relieves her stiffness and pain. Prior to Admission medications    Medication Sig Start Date End Date Taking? Authorizing Provider   amLODIPine (NORVASC) 5 mg tablet Take 1 Tablet by mouth daily. 3/29/22  Yes Italia Cyr MD   losartan (COZAAR) 50 mg tablet Take 1 Tablet by mouth daily. 3/29/22  Yes Italia Cyr MD   traZODone (DESYREL) 100 mg tablet 1 nightly 3/29/22  Yes Italia Cyr MD   rosuvastatin (CRESTOR) 20 mg tablet TAKE 1 TABLET BY MOUTH NIGHTLY 3/29/22  Yes Italia Cyr MD   naproxen (NAPROSYN) 500 mg tablet TAKE 1 TABLET BY MOUTH TWICE A DAY WITH MEALS 12/13/21  Yes Italia Cyr MD   cream base no. 157 (Q-DERM) Take 15 mg by mouth daily. 10/23/19  Yes Provider, Historical   CALCIUM-MAGNESIUM-ZINC PO Take  by mouth.    Yes Provider, Historical Cetirizine (ZYRTEC) 10 mg cap Take  by mouth. Yes Provider, Historical   multivit-minerals/folic acid (CENTRUM VITAMINTS PO) Take  by mouth. Yes Provider, Historical   omega-3 fatty acids-vitamin e (FISH OIL) 1,000 mg cap Take 1 Cap by mouth two (2) times a day. Yes Valerie Vega PA   calcium-cholecalciferol, D3, (CALTRATE 600+D) tablet Take 1 Tab by mouth two (2) times a day. Yes DAMIEN Nguyen   aspirin delayed-release 81 mg tablet Take  by mouth daily. Yes Provider, Historical   amLODIPine (NORVASC) 5 mg tablet Take 1 Tablet by mouth daily. 10/5/21 3/29/22  Stephanie Cyr MD   losartan (COZAAR) 50 mg tablet Take 1 Tablet by mouth daily. 10/5/21 3/29/22  Stephanie Cyr MD   traZODone (DESYREL) 50 mg tablet 1 nightly 10/5/21 3/29/22  Stephanie Cyr MD   rosuvastatin (CRESTOR) 20 mg tablet TAKE 1 TABLET BY MOUTH NIGHTLY 10/5/21 3/29/22  Stephanie Cyr MD   loratadine (CLARITIN) 10 mg tablet Take 1 Tab by mouth daily. Patient taking differently: Take 10 mg by mouth daily as needed. 4/7/16   Garrison Vega PA   triamcinolone (NASACORT AQ) 55 mcg nasal inhaler 2 Sprays by Both Nostrils route daily as needed.     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    Benign essential hypertension I10    Former tobacco use Z87.891    Grief at loss of child F43.21, Z63.4    Primary osteoarthritis of right hip M16.11     Patient Active Problem List    Diagnosis Date Noted    Primary osteoarthritis of right hip 03/19/2021    Grief at loss of child 03/29/2019    Former tobacco use 03/09/2017    Benign essential hypertension 10/19/2016    FHx: breast cancer in first degree relative 07/08/2016    Oral dysplasia, acquired 07/01/2016    Heart murmur 04/07/2016    Hx of iron deficiency anemia 01/20/2016    Pneumonia, organism unspecified(486) 03/21/2014    Skin cancer     Screening for osteoporosis 07/16/2013    Osteopenia 07/16/2013    Hx of screening mammography 06/27/2013    Pap smear for cervical cancer screening 06/27/2013    Post-menopausal 06/26/2013    Pure hypercholesterolemia 01/16/2012     Current Outpatient Medications   Medication Sig Dispense Refill    amLODIPine (NORVASC) 5 mg tablet Take 1 Tablet by mouth daily. 90 Tablet 1    losartan (COZAAR) 50 mg tablet Take 1 Tablet by mouth daily. 90 Tablet 1    traZODone (DESYREL) 100 mg tablet 1 nightly 90 Tablet 1    rosuvastatin (CRESTOR) 20 mg tablet TAKE 1 TABLET BY MOUTH NIGHTLY 90 Tablet 1    naproxen (NAPROSYN) 500 mg tablet TAKE 1 TABLET BY MOUTH TWICE A DAY WITH MEALS 180 Tablet 0    cream base no. 157 (Q-DERM) Take 15 mg by mouth daily.  CALCIUM-MAGNESIUM-ZINC PO Take  by mouth.  Cetirizine (ZYRTEC) 10 mg cap Take  by mouth.  multivit-minerals/folic acid (CENTRUM VITAMINTS PO) Take  by mouth.  omega-3 fatty acids-vitamin e (FISH OIL) 1,000 mg cap Take 1 Cap by mouth two (2) times a day.  calcium-cholecalciferol, D3, (CALTRATE 600+D) tablet Take 1 Tab by mouth two (2) times a day.  aspirin delayed-release 81 mg tablet Take  by mouth daily.  loratadine (CLARITIN) 10 mg tablet Take 1 Tab by mouth daily. (Patient taking differently: Take 10 mg by mouth daily as needed.) 1 Tab 0    triamcinolone (NASACORT AQ) 55 mcg nasal inhaler 2 Sprays by Both Nostrils route daily as needed.        Allergies   Allergen Reactions    Ace Inhibitors Cough    Lipitor [Atorvastatin] Other (comments)     Elevated CK     Past Medical History:   Diagnosis Date    Basal cell carcinoma of skin     Colon polyps     Elevated CK 3/25/15    mild    Heart murmur 16    echo showed trivial MR and trivial TR    Hemorrhoids     per Dr. Chante Nevarez Hyperlipidemia LDL goal < 100     Hypertension     Oral dysplasia, acquired 2016    Dr. Elijah Chance Osteopenia 2013    Pneumonia 3/21/14    Tobacco dependence     quit 3/19/14, restarted 2014     Past Surgical History:   Procedure Laterality Date    ENDOSCOPY, COLON, DIAGNOSTIC  , 14    colon polyps seen on both studies, repeat every 5 years (Dr. Sheela White)    HX CHOLECYSTECTOMY      HX GYN      tubal ligation    HX ORTHOPAEDIC      foot bunion removal, mortons neuroma; Dennis Nickel    HX OTHER SURGICAL      MOHS, BCC (Dr. Jules Cheatham)     Family History   Problem Relation Age of Onset    OSTEOARTHRITIS Mother     Heart Disease Mother     Heart Attack Mother         72    Osteoporosis Mother     Hypertension Mother     Stroke Father     Heart Disease Father     Hypertension Father     Breast Cancer Sister         dx age 48    Hypertension Daughter      Social History     Tobacco Use    Smoking status: Current Every Day Smoker     Packs/day: 0.75     Years: 43.00     Pack years: 32.25     Types: Cigarettes     Last attempt to quit: 10/3/2016     Years since quittin.4    Smokeless tobacco: Never Used   Substance Use Topics    Alcohol use: Yes     Alcohol/week: 14.0 standard drinks     Types: 14 Shots of liquor per week       Review of Systems   Constitutional: Negative for chills, fever, malaise/fatigue and weight loss. HENT: Negative for congestion, hearing loss, sore throat and tinnitus. Eyes: Negative for blurred vision, pain and discharge. Respiratory: Negative for cough, shortness of breath and wheezing. Cardiovascular: Negative for chest pain, palpitations, orthopnea, claudication and leg swelling.    Gastrointestinal: Negative for abdominal pain, constipation and heartburn. Genitourinary: Negative for dysuria, frequency and urgency. Musculoskeletal: Negative for falls, joint pain and myalgias. Skin: Negative for itching and rash. Neurological: Negative for dizziness, tingling, tremors and headaches. Endo/Heme/Allergies: Negative for environmental allergies and polydipsia. Psychiatric/Behavioral: Negative for depression and substance abuse. The patient has insomnia (On the 50 mg trazodone but the 100 mg trazodone works better. ). The patient is not nervous/anxious. No data recorded     Raciel Pandya was evaluated through a patient-initiated, synchronous (real-time) audio only encounter. She (or guardian if applicable) is aware that it is a billable service, which includes applicable co-pays, with coverage as determined by her insurance carrier. This visit was conducted with the patient's (and/or Norm Cleaves guardian's) verbal consent. She has not had a related appointment within my department in the past 7 days or scheduled within the next 24 hours. The patient was located in a state where the provider was licensed to provide care.     Total Time: minutes: 11-20 minutes    Joanne Sin MD

## 2022-09-29 DIAGNOSIS — I10 ESSENTIAL HYPERTENSION: ICD-10-CM

## 2022-09-29 DIAGNOSIS — Z63.4 GRIEF AT LOSS OF CHILD: ICD-10-CM

## 2022-09-29 DIAGNOSIS — F43.21 GRIEF AT LOSS OF CHILD: ICD-10-CM

## 2022-09-29 RX ORDER — LOSARTAN POTASSIUM 50 MG/1
TABLET ORAL
Qty: 90 TABLET | Refills: 1 | Status: SHIPPED | OUTPATIENT
Start: 2022-09-29

## 2022-09-29 RX ORDER — AMLODIPINE BESYLATE 5 MG/1
TABLET ORAL
Qty: 90 TABLET | Refills: 1 | Status: SHIPPED | OUTPATIENT
Start: 2022-09-29

## 2022-09-29 RX ORDER — TRAZODONE HYDROCHLORIDE 100 MG/1
TABLET ORAL
Qty: 90 TABLET | Refills: 1 | Status: SHIPPED | OUTPATIENT
Start: 2022-09-29

## 2022-09-29 SDOH — SOCIAL STABILITY - SOCIAL INSECURITY: DISSAPEARANCE AND DEATH OF FAMILY MEMBER: Z63.4

## 2022-11-08 ENCOUNTER — OFFICE VISIT (OUTPATIENT)
Dept: FAMILY MEDICINE CLINIC | Age: 72
End: 2022-11-08
Payer: MEDICARE

## 2022-11-08 VITALS
BODY MASS INDEX: 21.02 KG/M2 | TEMPERATURE: 98.1 F | HEIGHT: 65 IN | DIASTOLIC BLOOD PRESSURE: 75 MMHG | RESPIRATION RATE: 20 BRPM | WEIGHT: 126.2 LBS | HEART RATE: 90 BPM | OXYGEN SATURATION: 95 % | SYSTOLIC BLOOD PRESSURE: 135 MMHG

## 2022-11-08 DIAGNOSIS — M16.11 PRIMARY OSTEOARTHRITIS OF RIGHT HIP: ICD-10-CM

## 2022-11-08 DIAGNOSIS — E78.2 MIXED HYPERLIPIDEMIA: ICD-10-CM

## 2022-11-08 DIAGNOSIS — Z00.00 MEDICARE ANNUAL WELLNESS VISIT, SUBSEQUENT: ICD-10-CM

## 2022-11-08 DIAGNOSIS — Z23 NEEDS FLU SHOT: ICD-10-CM

## 2022-11-08 DIAGNOSIS — M85.89 OSTEOPENIA OF MULTIPLE SITES: ICD-10-CM

## 2022-11-08 DIAGNOSIS — I10 ESSENTIAL HYPERTENSION: Primary | ICD-10-CM

## 2022-11-08 DIAGNOSIS — F43.21 GRIEF AT LOSS OF CHILD: ICD-10-CM

## 2022-11-08 DIAGNOSIS — Z63.4 GRIEF AT LOSS OF CHILD: ICD-10-CM

## 2022-11-08 DIAGNOSIS — Z78.0 POST-MENOPAUSAL: ICD-10-CM

## 2022-11-08 PROCEDURE — 90694 VACC AIIV4 NO PRSRV 0.5ML IM: CPT | Performed by: FAMILY MEDICINE

## 2022-11-08 PROCEDURE — 36415 COLL VENOUS BLD VENIPUNCTURE: CPT | Performed by: FAMILY MEDICINE

## 2022-11-08 PROCEDURE — 99214 OFFICE O/P EST MOD 30 MIN: CPT | Performed by: FAMILY MEDICINE

## 2022-11-08 PROCEDURE — G0439 PPPS, SUBSEQ VISIT: HCPCS | Performed by: FAMILY MEDICINE

## 2022-11-08 PROCEDURE — G0008 ADMIN INFLUENZA VIRUS VAC: HCPCS | Performed by: FAMILY MEDICINE

## 2022-11-08 RX ORDER — LOSARTAN POTASSIUM 50 MG/1
50 TABLET ORAL DAILY
Qty: 90 TABLET | Refills: 1 | Status: SHIPPED | OUTPATIENT
Start: 2022-11-08

## 2022-11-08 RX ORDER — NAPROXEN 500 MG/1
500 TABLET ORAL 2 TIMES DAILY WITH MEALS
Qty: 180 TABLET | Refills: 1 | Status: SHIPPED | OUTPATIENT
Start: 2022-11-08

## 2022-11-08 RX ORDER — TRAZODONE HYDROCHLORIDE 100 MG/1
TABLET ORAL
Qty: 90 TABLET | Refills: 1 | Status: SHIPPED | OUTPATIENT
Start: 2022-11-08

## 2022-11-08 RX ORDER — AMLODIPINE BESYLATE 5 MG/1
5 TABLET ORAL DAILY
Qty: 90 TABLET | Refills: 1 | Status: SHIPPED | OUTPATIENT
Start: 2022-11-08

## 2022-11-08 RX ORDER — ROSUVASTATIN CALCIUM 20 MG/1
20 TABLET, COATED ORAL
Qty: 90 TABLET | Refills: 1 | Status: SHIPPED | OUTPATIENT
Start: 2022-11-08

## 2022-11-08 SDOH — SOCIAL STABILITY - SOCIAL INSECURITY: DISSAPEARANCE AND DEATH OF FAMILY MEMBER: Z63.4

## 2022-11-08 NOTE — PATIENT INSTRUCTIONS
Medicare Wellness Visit, Female     The best way to live healthy is to have a lifestyle where you eat a well-balanced diet, exercise regularly, limit alcohol use, and quit all forms of tobacco/nicotine, if applicable. Regular preventive services are another way to keep healthy. Preventive services (vaccines, screening tests, monitoring & exams) can help personalize your care plan, which helps you manage your own care. Screening tests can find health problems at the earliest stages, when they are easiest to treat. [facility] follows the current, evidence-based guidelines published by the [facility] ([facility]) when recommending preventive services for our patients. Because we follow these guidelines, sometimes recommendations change over time as research supports it. (For example, mammograms used to be recommended annually. Even though Medicare will still pay for an annual mammogram, the newer guidelines recommend a mammogram every two years for women of average risk). Of course, you and your doctor may decide to screen more often for some diseases, based on your risk and your co-morbidities (chronic disease you are already diagnosed with). Preventive services for you include:  - Medicare offers their members a free annual wellness visit, which is time for you and your primary care provider to discuss and plan for your preventive service needs. Take advantage of this benefit every year!  -All adults over the age of 72 should receive the recommended pneumonia vaccines. Current USPSTF guidelines recommend a series of two vaccines for the best pneumonia protection.   -All adults should have a flu vaccine yearly and a tetanus vaccine every 10 years.   -All adults age 48 and older should receive the shingles vaccines (series of two vaccines).       -All adults age 38-68 who are overweight should have a diabetes screening test once every three years.   -All adults born between [birth year] and 1965 should be screened once for Hepatitis C.  -Other screening tests and preventive services for persons with diabetes include: an eye exam to screen for diabetic retinopathy, a kidney function test, a foot exam, and stricter control over your cholesterol.   -Cardiovascular screening for adults with routine risk involves an electrocardiogram (ECG) at intervals determined by your doctor.   -Colorectal cancer screenings should be done for adults age 54-65 with no increased risk factors for colorectal cancer. There are a number of acceptable methods of screening for this type of cancer. Each test has its own benefits and drawbacks. Discuss with your doctor what is most appropriate for you during your annual wellness visit. The different tests include: colonoscopy (considered the best screening method), a fecal occult blood test, a fecal DNA test, and sigmoidoscopy.    -A bone mass density test is recommended when a woman turns 65 to screen for osteoporosis. This test is only recommended one time, as a screening. Some providers will use this same test as a disease monitoring tool if you already have osteoporosis. -Breast cancer screenings are recommended every other year for women of normal risk, age 54-69.  -Cervical cancer screenings for women over age 72 are only recommended with certain risk factors. Here is a list of your current Health Maintenance items (your personalized list of preventive services) with a due date:  Health Maintenance Due   Topic Date Due    Cholesterol Test   10/05/2022    Mammogram  12/02/2022         Vaccine Information Statement    Influenza (Flu) Vaccine (Inactivated or Recombinant): What You Need to Know    Many vaccine information statements are available in Romansh and other languages. See www.immunize.org/vis. Hojas de información sobre vacunas están disponibles en español y en muchos otros idiomas. Visite www.immunize.org/vis.     1. Why get vaccinated? Influenza vaccine can prevent influenza (flu). Flu is a contagious disease that spreads around the [place] every year, usually between October and May. Anyone can get the flu, but it is more dangerous for some people. Infants and young children, people 72 years and older, pregnant people, and people with certain health conditions or a weakened immune system are at greatest risk of flu complications. Pneumonia, bronchitis, sinus infections, and ear infections are examples of flu-related complications. If you have a medical condition, such as heart disease, cancer, or diabetes, flu can make it worse. Flu can cause fever and chills, sore throat, muscle aches, fatigue, cough, headache, and runny or stuffy nose. Some people may have vomiting and diarrhea, though this is more common in children than adults. In an average year, thousands of people in the [facility] die from flu, and many more are hospitalized. Flu vaccine prevents millions of illnesses and flu-related visits to the doctor each year. 2. Influenza vaccines     CDC recommends everyone 6 months and older get vaccinated every flu season. Children 6 months through 6years of age may need 2 doses during a single flu season. Everyone else needs only 1 dose each flu season. It takes about 2 weeks for protection to develop after vaccination. There are many flu viruses, and they are always changing. Each year a new flu vaccine is made to protect against the influenza viruses believed to be likely to cause disease in the upcoming flu season. Even when the vaccine doesnt exactly match these viruses, it may still provide some protection. Influenza vaccine does not cause flu. Influenza vaccine may be given at the same time as other vaccines.     3. Talk with your health care provider    Tell your vaccination provider if the person getting the vaccine:  Has had an allergic reaction after a previous dose of influenza vaccine, or has any severe, life-threatening allergies   Has ever had Guillain-Barré Syndrome (also called GBS)    In some cases, your health care provider may decide to postpone influenza vaccination until a future visit. Influenza vaccine can be administered at any time during pregnancy. People who are or will be pregnant during influenza season should receive inactivated influenza vaccine. People with minor illnesses, such as a cold, may be vaccinated. People who are moderately or severely ill should usually wait until they recover before getting influenza vaccine. Your health care provider can give you more information. 4. Risks of a vaccine reaction    Soreness, redness, and swelling where the shot is given, fever, muscle aches, and headache can happen after influenza vaccination. There may be a very small increased risk of Guillain-Barré Syndrome (GBS) after inactivated influenza vaccine (the flu shot). Janellilo Renteria children who get the flu shot along with pneumococcal vaccine (PCV13) and/or DTaP vaccine at the same time might be slightly more likely to have a seizure caused by fever. Tell your health care provider if a child who is getting flu vaccine has ever had a seizure. People sometimes faint after medical procedures, including vaccination. Tell your provider if you feel dizzy or have vision changes or ringing in the ears. As with any medicine, there is a very remote chance of a vaccine causing a severe allergic reaction, other serious injury, or death. 5. What if there is a serious problem? An allergic reaction could occur after the vaccinated person leaves the clinic. If you see signs of a severe allergic reaction (hives, swelling of the face and throat, difficulty breathing, a fast heartbeat, dizziness, or weakness), call 9-1-1 and get the person to the nearest hospital.    For other signs that concern you, call your health care provider.     Adverse reactions should be reported to the Vaccine Adverse Event Reporting System (VAERS). Your health care provider will usually file this report, or you can do it yourself. Visit the VAERS website at www.vaers. hhs.gov or call 6-194.434.2478. VAERS is only for reporting reactions, and VAERS staff members do not give medical advice. 6. The National Vaccine Injury Compensation Program    The [facility] Vaccine Injury Compensation Program (VICP) is a federal program that was created to compensate people who may have been injured by certain vaccines. Claims regarding alleged injury or death due to vaccination have a time limit for filing, which may be as short as two years. Visit the VICP website at www.[facility].gov/vaccinecompensation or call 0-454.717.7158 to learn about the program and about filing a claim. 7. How can I learn more? Ask your health care provider. Call your local or state health department. Visit the website of the Food and Drug Administration (FDA) for vaccine package inserts and additional information at www.fda.gov/vaccines-blood-biologics/vaccines. Contact the Centers for Disease Control and Prevention (CDC): Call 3-538.430.3693 (1-800-CDC-INFO) or  Visit CDCs influenza website at www.cdc.gov/flu. Vaccine Information Statement   Inactivated Influenza Vaccine   8/6/2021  42 RICH Marinelli [id number]   Department of Health and Human Services  Centers for Disease Control and Prevention    Office Use Only

## 2022-11-08 NOTE — PROGRESS NOTES
1. \"Have you been to the ER, urgent care clinic since your last visit? Hospitalized since your last visit? \" No    2. \"Have you seen or consulted any other health care providers outside of the [address] [place] [address] since your last visit? \" No     3. For patients aged 39-70: Has the patient had a colonoscopy / FIT/ Cologuard? No     If the patient is female:    4. For patients aged 41-77: Has the patient had a mammogram within the past 2 years? No    5. For patients aged 21-65: Has the patient had a pap smear? NA - based on age This is the Subsequent Medicare Annual Wellness Exam, performed 12 months or more after the Initial AWV or the last Subsequent AWV    I have reviewed the patient's medical history in detail and updated the computerized patient record. Functional Ability:   Does the patient exhibit a steady gait? yes   How long did it take the patient to get up and walk from a sitting position? 2sec   Is the patient self reliant?  (ie can do own laundry, meals, household chores)  yes     Does the patient handle his/her own medications? yes     Does the patient handle his/her own money? yes     Is the patients home safe (ie good lighting, handrails on stairs and bath, etc.)? yes     Did you notice or did patient express any hearing difficulties? no     Did you notice or did patient express any vision difficulties?   no     Were distance and reading eye charts used? no       Advance Care Planning:   Patient was offered the opportunity to discuss advance care planning:  yes     Does patient have an Advance Directive:  no   If no, did you provide information on Caring Connections?   yes     ADL Assessment 11/8/2022   Feeding yourself No Help Needed   Getting from bed to chair No Help Needed   Getting dressed No Help Needed   Bathing or showering No Help Needed   Walk across the room (includes cane/walker) No Help Needed   Using the telphone No Help Needed   Taking your medications No Help Needed Preparing meals No Help Needed   Managing money (expenses/bills) No Help Needed   Moderately strenuous housework (laundry) No Help Needed   Shopping for personal items (toiletries/medicines) No Help Needed   Shopping for groceries No Help Needed   Driving No Help Needed   Climbing a flight of stairs No Help Needed   Getting to places beyond walking distances No Help Needed          Assessment/Plan   Education and counseling provided:  Are appropriate based on today's review and evaluation    1. Essential hypertension  -     losartan (COZAAR) 50 mg tablet; Take 1 Tablet by mouth daily. , Normal, Disp-90 Tablet, R-1  -     amLODIPine (NORVASC) 5 mg tablet; Take 1 Tablet by mouth daily. , Normal, Disp-90 Tablet, R-1  -     COLLECTION VENOUS BLOOD,VENIPUNCTURE  -     CBC WITH AUTOMATED DIFF; Future  -     METABOLIC PANEL, COMPREHENSIVE; Future  -     LIPID PANEL; Future  2. Primary osteoarthritis of right hip  -     naproxen (NAPROSYN) 500 mg tablet; Take 1 Tablet by mouth two (2) times daily (with meals). , Normal, Disp-180 Tablet, R-1  3. Osteopenia of multiple sites  4. Post-menopausal  5. Mixed hyperlipidemia  -     rosuvastatin (CRESTOR) 20 mg tablet; Take 1 Tablet by mouth nightly., Normal, Disp-90 Tablet, R-1  6. Grief at loss of child  -     traZODone (DESYREL) 100 mg tablet; TAKE 1 TABLET BY MOUTH NIGHTLY, Normal, Disp-90 Tablet, R-1  7.  Medicare annual wellness visit, subsequent     Depression Risk Factor Screening     3 most recent PHQ Screens 11/8/2022   PHQ Not Done -   Little interest or pleasure in doing things Several days   Feeling down, depressed, irritable, or hopeless Several days   Total Score PHQ 2 2       Alcohol & Drug Abuse Risk Screen    Do you average more than 1 drink per night or more than 7 drinks a week:  No    On any one occasion in the past three months have you have had more than 3 drinks containing alcohol:  No          Functional Ability and Level of Safety    Hearing: Hearing is good.      Activities of Daily Living: The home contains: no safety equipment. Patient does total self care      Ambulation: with no difficulty     Fall Risk:  Fall Risk Assessment, last 12 mths 11/8/2022   Able to walk? Yes   Fall in past 12 months? 0   Do you feel unsteady? 0   Are you worried about falling 0   Number of falls in past 12 months -   Fall with injury?  -      Abuse Screen:  Patient is not abused       Cognitive Screening    Has your family/caregiver stated any concerns about your memory: no     Cognitive Screening: Normal - Clock Drawing Test    Health Maintenance Due     Health Maintenance Due   Topic Date Due    Lipid Screen  10/05/2022    Breast Cancer Screen Mammogram  12/02/2022       Patient Care Team   Patient Care Team:  Migel Romero MD as PCP - General (Family Medicine)  Migel Romero MD as PCP - [address] [place] [address] Dr Laura Provider    History     Patient Active Problem List   Diagnosis Code    Post-menopausal Z78.0    Hx of screening mammography Z92.89    Pap smear for cervical cancer screening Z12.4    Screening for osteoporosis Z13.820    Osteopenia M85.80    Skin cancer C44.90    Pneumonia, organism unspecified(486) J18.9    Hx of iron deficiency anemia Z86.2    Heart murmur R01.1    FHx: breast cancer in first degree relative Z80.3    Oral dysplasia, 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 Liz Gallego    Hyperlipidemia LDL goal < 100     Hypertension     Oral dysplasia, acquired 07/2016    Dr. Joel Salamanca    Osteopenia 7/2013    Pneumonia 3/21/14    Tobacco dependence     quit 3/19/14, restarted 5/2014      Past Surgical History:   Procedure Laterality Date    ENDOSCOPY, COLON, DIAGNOSTIC  2010, 5/23/14    colon polyps seen on both studies, repeat every 5 years (Dr. Tamera Nash)    HX CHOLECYSTECTOMY      HX GYN      tubal ligation    HX ORTHOPAEDIC  1995    foot bunion removal, mortons neuroma; [facility]    HX OTHER SURGICAL      MOHS, [facility] (Dr. Taylor Davila)     Current Outpatient Medications   Medication Sig Dispense Refill    traZODone (DESYREL) 100 mg tablet TAKE 1 TABLET BY MOUTH NIGHTLY 90 Tablet 1    rosuvastatin (CRESTOR) 20 mg tablet Take 1 Tablet by mouth nightly. 90 Tablet 1    naproxen (NAPROSYN) 500 mg tablet Take 1 Tablet by mouth two (2) times daily (with meals). 180 Tablet 1    losartan (COZAAR) 50 mg tablet Take 1 Tablet by mouth daily. 90 Tablet 1    amLODIPine (NORVASC) 5 mg tablet Take 1 Tablet by mouth daily. 90 Tablet 1    cream base no. 157 (Q-DERM) Take 15 mg by mouth daily. CALCIUM-MAGNESIUM-ZINC PO Take  by mouth. Cetirizine (ZYRTEC) 10 mg cap Take  by mouth.      multivit-minerals/folic acid (CENTRUM VITAMINTS PO) Take  by mouth. omega-3 fatty acids-vitamin e (FISH OIL) 1,000 mg cap Take 1 Cap by mouth two (2) times a day. loratadine (CLARITIN) 10 mg tablet Take 1 Tab by mouth daily. (Patient taking differently: Take 10 mg by mouth daily as needed.) 1 Tab 0    calcium-cholecalciferol, D3, (CALTRATE 600+D) tablet Take 1 Tab by mouth two (2) times a day.        Allergies   Allergen Reactions    Ace Inhibitors Cough    Lipitor [Atorvastatin] Other (comments)     Elevated CK       Family History   Problem Relation Age of Onset    OSTEOARTHRITIS Mother     Heart Disease Mother     Heart Attack Mother         72    Osteoporosis Mother     Hypertension Mother     Stroke Father     Heart Disease Father     Hypertension Father     Breast Cancer Sister         dx age 48    Hypertension Daughter      Social History     Tobacco Use    Smoking status: Every Day Packs/day: 0.75     Years: 43.00     Pack years: 32.25     Types: Cigarettes     Last attempt to quit: 10/3/2016     Years since quittin.1    Smokeless tobacco: Current   Substance Use Topics    Alcohol use: Yes     Alcohol/week: 14.0 standard drinks     Types: 14 Shots of liquor per week         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 Patient is postmenopausal and does have a history of osteopenia but is not due another DEXA scan at this time. Patient does have primary osteoarthritis of multiple joints but especially her right hip is giving her enough trouble at this point in time she wishes to seek orthopedic consultation but wants to do a little research into which 1 she wants to see. The patient still relies on her trazodone to get her to sleep as she states sometimes she wakes up in the middle of the night and her mind is just racing and she cannot cut it off but the trazodone definitely helps. The patient is on rosuvastatin for her mixed hyperlipidemia and she is tolerating this without muscle pain or weakness. Allergies   Allergen Reactions    Ace Inhibitors Cough    Lipitor [Atorvastatin] Other (comments)     Elevated CK       Current Outpatient Medications   Medication Sig    traZODone (DESYREL) 100 mg tablet TAKE 1 TABLET BY MOUTH NIGHTLY    rosuvastatin (CRESTOR) 20 mg tablet Take 1 Tablet by mouth nightly. naproxen (NAPROSYN) 500 mg tablet Take 1 Tablet by mouth two (2) times daily (with meals). losartan (COZAAR) 50 mg tablet Take 1 Tablet by mouth daily. amLODIPine (NORVASC) 5 mg tablet Take 1 Tablet by mouth daily. cream base no. 157 (Q-DERM) Take 15 mg by mouth daily. CALCIUM-MAGNESIUM-ZINC PO Take  by mouth. Cetirizine (ZYRTEC) 10 mg cap Take  by mouth.    multivit-minerals/folic acid (CENTRUM VITAMINTS PO) Take  by mouth. omega-3 fatty acids-vitamin e (FISH OIL) 1,000 mg cap Take 1 Cap by mouth two (2) times a day. loratadine (CLARITIN) 10 mg tablet Take 1 Tab by mouth daily. (Patient taking differently: Take 10 mg by mouth daily as needed.)    calcium-cholecalciferol, D3, (CALTRATE 600+D) tablet Take 1 Tab by mouth two (2) times a day. No current facility-administered medications for this visit.        Past Medical History:   Diagnosis Date    Basal cell carcinoma of skin     Benign essential hypertension 10/19/2016    Colon polyps     Elevated CK 3/25/15    mild    Heart murmur 4/7/16    echo showed trivial MR and trivial TR    Hemorrhoids     per Dr. Radha Odell    Hyperlipidemia LDL goal < 100     Hypertension     Oral dysplasia, acquired 07/2016    Dr. Zach Chavez    Osteopenia 7/2013    Pneumonia 3/21/14    Tobacco dependence     quit 3/19/14, restarted 5/2014       Past Surgical History:   Procedure Laterality Date    ENDOSCOPY, COLON, DIAGNOSTIC  2010, 5/23/14    colon polyps seen on both studies, repeat every 5 years (Dr. Angie Ha)    HX CHOLECYSTECTOMY      HX GYN      tubal ligation    HX ORTHOPAEDIC  1995    foot bunion removal, mortons neuroma; [facility]    HX OTHER SURGICAL      MOHS, [facility] (Dr. Dominic Holbrook)       Family History   Problem Relation Age of Onset    OSTEOARTHRITIS Mother     Heart Disease Mother     Heart Attack Mother         72    Osteoporosis Mother     Hypertension Mother     Stroke Father     Heart Disease Father     Hypertension Father     Breast Cancer Sister         dx age 48    Hypertension Daughter        Social History     Socioeconomic History    Marital status:    Tobacco Use    Smoking status: Every Day     Packs/day: 0.75     Years: 43.00     Pack years: 32.25     Types: Cigarettes     Last attempt to quit: 10/3/2016     Years since quittin.1    Smokeless tobacco: Current   Substance and Sexual Activity    Alcohol use: Yes     Alcohol/week: 14.0 standard drinks     Types: 14 Shots of liquor per week    Drug use: No    Sexual activity: Not Currently     Partners: Male       Review of Systems   Constitutional:  Negative for chills, fever, malaise/fatigue and weight loss. HENT:  Negative for congestion, hearing loss, sore throat and tinnitus. Eyes:  Negative for blurred vision, pain and discharge. Respiratory:  Negative for cough, shortness of breath and wheezing. Cardiovascular:  Negative for chest pain, palpitations, orthopnea, claudication and leg swelling. Gastrointestinal:  Negative for abdominal pain, constipation and heartburn. Genitourinary:  Negative for dysuria, frequency and urgency. Musculoskeletal:  Positive for joint pain. Negative for falls and myalgias. Skin:  Negative for itching and rash. Neurological:  Negative for dizziness, tingling, tremors and headaches. Endo/Heme/Allergies:  Negative for environmental allergies and polydipsia. Psychiatric/Behavioral:  Negative for depression and substance abuse. The patient has insomnia. The patient is not nervous/anxious. Visit Vitals  /75 (BP 1 Location: Left arm)   Pulse 90   Temp 98.1 °F (36.7 °C)   Resp 20   Ht 5' 5\" (1.651 m)   Wt 126 lb 3.2 oz (57.2 kg)   SpO2 95%   BMI 21.00 kg/m²     Physical Exam  Vitals reviewed. Constitutional:       General: She is not in acute distress. Appearance: Normal appearance. She is well-developed. She is not ill-appearing. HENT:      Head: Normocephalic and atraumatic. Right Ear: Tympanic membrane, ear canal and external ear normal.      Left Ear: Tympanic membrane, ear canal and external ear normal.      Nose: Nose normal. No congestion or rhinorrhea.       Mouth/Throat:      Mouth: Mucous membranes are moist.      Pharynx: No posterior oropharyngeal erythema. Eyes:      General:         Right eye: No discharge. Left eye: No discharge. Extraocular Movements: Extraocular movements intact. Conjunctiva/sclera: Conjunctivae normal.      Pupils: Pupils are equal, round, and reactive to light. Comments: Cornea anterior chamber and iris are normal.   Neck:      Trachea: No tracheal deviation. Cardiovascular:      Rate and Rhythm: Normal rate and regular rhythm. Pulses: Normal pulses. Heart sounds: Normal heart sounds. No murmur heard. No friction rub. No gallop. Pulmonary:      Effort: Pulmonary effort is normal. No respiratory distress. Breath sounds: Normal breath sounds. No wheezing or rhonchi. Chest:      Chest wall: No tenderness. Abdominal:      General: Bowel sounds are normal. There is no distension. Palpations: Abdomen is soft. There is no mass. Tenderness: There is no abdominal tenderness. There is no guarding or rebound. Musculoskeletal:         General: No swelling, tenderness or deformity. Cervical back: Normal range of motion and neck supple. Right lower leg: No edema. Left lower leg: No edema. Lymphadenopathy:      Cervical: No cervical adenopathy. Skin:     General: Skin is warm and dry. Coloration: Skin is not pale. Findings: No erythema or rash. Neurological:      General: No focal deficit present. Mental Status: She is alert and oriented to person, place, and time. Cranial Nerves: No cranial nerve deficit. Sensory: No sensory deficit. Motor: No abnormal muscle tone. Deep Tendon Reflexes: Reflexes are normal and symmetric. Reflexes normal.      Comments: Cranial nerves II through XII are intact sensory and motor. Biceps triceps knee and ankle DTRs are normal and symmetrical.   Psychiatric:         Mood and Affect: Mood normal.         Behavior: Behavior normal.         Thought Content:  Thought content normal. Judgment: Judgment normal.         ICD-10-CM ICD-9-CM    1. Essential hypertension  I10 401.9 losartan (COZAAR) 50 mg tablet      amLODIPine (NORVASC) 5 mg tablet      COLLECTION VENOUS BLOOD,VENIPUNCTURE      CBC WITH AUTOMATED DIFF      METABOLIC PANEL, COMPREHENSIVE      LIPID PANEL      2. Primary osteoarthritis of right hip  M16.11 715.15 naproxen (NAPROSYN) 500 mg tablet      3. Osteopenia of multiple sites  M85.89 733.90       4. Post-menopausal  Z78.0 V49.81       5. Mixed hyperlipidemia  E78.2 272.2 rosuvastatin (CRESTOR) 20 mg tablet      6. Grief at loss of child  F43.21 309.0 traZODone (DESYREL) 100 mg tablet    Z63.4        7. Medicare annual wellness visit, subsequent  Z00.00 V70.0           Orders Placed This Encounter    CBC WITH AUTOMATED DIFF     Standing Status:   Future     Standing Expiration Date:   92/9/5571    METABOLIC PANEL, COMPREHENSIVE     Standing Status:   Future     Standing Expiration Date:   12/8/2022    LIPID PANEL     Standing Status:   Future     Standing Expiration Date:   12/8/2022    COLLECTION VENOUS BLOOD,VENIPUNCTURE    traZODone (DESYREL) 100 mg tablet     Sig: TAKE 1 TABLET BY MOUTH NIGHTLY     Dispense:  90 Tablet     Refill:  1    rosuvastatin (CRESTOR) 20 mg tablet     Sig: Take 1 Tablet by mouth nightly. Dispense:  90 Tablet     Refill:  1    naproxen (NAPROSYN) 500 mg tablet     Sig: Take 1 Tablet by mouth two (2) times daily (with meals). Dispense:  180 Tablet     Refill:  1    losartan (COZAAR) 50 mg tablet     Sig: Take 1 Tablet by mouth daily. Dispense:  90 Tablet     Refill:  1    amLODIPine (NORVASC) 5 mg tablet     Sig: Take 1 Tablet by mouth daily. Dispense:  90 Tablet     Refill:  1       Follow-up and Dispositions    Return in about 6 months (around 5/8/2023).          Mike Pack MD

## 2022-11-08 NOTE — PROGRESS NOTES
Patient was administered Flu shot in right deltoid via IM. Patient tolerated Flu shot well. Medicatirighton information reviewed with patient, patient states understanding. Patient to resume routine medications at home. Patient given copy of AVS and VIIS with medication information and instructions for home. VIIS reviewed with patient and patient states understanding.

## 2022-11-11 ENCOUNTER — LAB ONLY (OUTPATIENT)
Dept: FAMILY MEDICINE CLINIC | Age: 72
End: 2022-11-11

## 2022-11-12 LAB
ALBUMIN SERPL-MCNC: 3.8 G/DL (ref 3.5–5)
ALBUMIN/GLOB SERPL: 1.2 {RATIO} (ref 1.1–2.2)
ALP SERPL-CCNC: 70 U/L (ref 45–117)
ALT SERPL-CCNC: 24 U/L (ref 12–78)
ANION GAP SERPL CALC-SCNC: 4 MMOL/L (ref 5–15)
AST SERPL-CCNC: 15 U/L (ref 15–37)
BASOPHILS # BLD: 0.1 K/UL (ref 0–0.1)
BASOPHILS NFR BLD: 1 % (ref 0–1)
BILIRUB SERPL-MCNC: 0.4 MG/DL (ref 0.2–1)
BUN SERPL-MCNC: 13 MG/DL (ref 6–20)
BUN/CREAT SERPL: 24 (ref 12–20)
CALCIUM SERPL-MCNC: 9 MG/DL (ref 8.5–10.1)
CHLORIDE SERPL-SCNC: 106 MMOL/L (ref 97–108)
CHOLEST SERPL-MCNC: 182 MG/DL
CO2 SERPL-SCNC: 29 MMOL/L (ref 21–32)
CREAT SERPL-MCNC: 0.54 MG/DL (ref 0.55–1.02)
DIFFERENTIAL METHOD BLD: NORMAL
EOSINOPHIL # BLD: 0.3 K/UL (ref 0–0.4)
EOSINOPHIL NFR BLD: 3 % (ref 0–7)
ERYTHROCYTE [DISTWIDTH] IN BLOOD BY AUTOMATED COUNT: 12.9 % (ref 11.5–14.5)
GLOBULIN SER CALC-MCNC: 3.3 G/DL (ref 2–4)
GLUCOSE SERPL-MCNC: 92 MG/DL (ref 65–100)
HCT VFR BLD AUTO: 42.6 % (ref 35–47)
HDLC SERPL-MCNC: 65 MG/DL
HDLC SERPL: 2.8 {RATIO} (ref 0–5)
HGB BLD-MCNC: 13.8 G/DL (ref 11.5–16)
IMM GRANULOCYTES # BLD AUTO: 0 K/UL (ref 0–0.04)
IMM GRANULOCYTES NFR BLD AUTO: 0 % (ref 0–0.5)
LDLC SERPL CALC-MCNC: 93.4 MG/DL (ref 0–100)
LYMPHOCYTES # BLD: 3 K/UL (ref 0.8–3.5)
LYMPHOCYTES NFR BLD: 32 % (ref 12–49)
MCH RBC QN AUTO: 29.9 PG (ref 26–34)
MCHC RBC AUTO-ENTMCNC: 32.4 G/DL (ref 30–36.5)
MCV RBC AUTO: 92.4 FL (ref 80–99)
MONOCYTES # BLD: 0.9 K/UL (ref 0–1)
MONOCYTES NFR BLD: 9 % (ref 5–13)
NEUTS SEG # BLD: 5.3 K/UL (ref 1.8–8)
NEUTS SEG NFR BLD: 55 % (ref 32–75)
NRBC # BLD: 0 K/UL (ref 0–0.01)
NRBC BLD-RTO: 0 PER 100 WBC
PLATELET # BLD AUTO: 258 K/UL (ref 150–400)
PMV BLD AUTO: 10 FL (ref 8.9–12.9)
POTASSIUM SERPL-SCNC: 4.1 MMOL/L (ref 3.5–5.1)
PROT SERPL-MCNC: 7.1 G/DL (ref 6.4–8.2)
RBC # BLD AUTO: 4.61 M/UL (ref 3.8–5.2)
SODIUM SERPL-SCNC: 139 MMOL/L (ref 136–145)
TRIGL SERPL-MCNC: 118 MG/DL (ref ?–150)
VLDLC SERPL CALC-MCNC: 23.6 MG/DL
WBC # BLD AUTO: 9.6 K/UL (ref 3.6–11)

## 2022-12-16 ENCOUNTER — HOSPITAL ENCOUNTER (OUTPATIENT)
Dept: MAMMOGRAPHY | Age: 72
End: 2022-12-16
Attending: FAMILY MEDICINE
Payer: MEDICARE

## 2022-12-16 DIAGNOSIS — Z12.31 VISIT FOR SCREENING MAMMOGRAM: ICD-10-CM

## 2022-12-16 PROCEDURE — 77063 BREAST TOMOSYNTHESIS BI: CPT

## 2023-02-10 DIAGNOSIS — R63.4 WEIGHT LOSS: ICD-10-CM

## 2023-02-10 DIAGNOSIS — R53.82 CHRONIC FATIGUE: Primary | ICD-10-CM

## 2023-02-13 ENCOUNTER — LAB ONLY (OUTPATIENT)
Dept: FAMILY MEDICINE CLINIC | Age: 73
End: 2023-02-13
Payer: MEDICARE

## 2023-02-13 DIAGNOSIS — R53.82 CHRONIC FATIGUE: ICD-10-CM

## 2023-02-13 DIAGNOSIS — R63.4 WEIGHT LOSS: ICD-10-CM

## 2023-02-13 PROCEDURE — 36415 COLL VENOUS BLD VENIPUNCTURE: CPT | Performed by: FAMILY MEDICINE

## 2023-02-14 LAB
T4 SERPL-MCNC: 6.5 UG/DL (ref 4.8–13.9)
TSH SERPL DL<=0.05 MIU/L-ACNC: 0.72 UIU/ML (ref 0.36–3.74)

## 2023-02-15 LAB — T3 SERPL-MCNC: 78 NG/DL (ref 71–180)

## 2023-02-28 ENCOUNTER — VIRTUAL VISIT (OUTPATIENT)
Dept: FAMILY MEDICINE CLINIC | Age: 73
End: 2023-02-28
Payer: MEDICARE

## 2023-02-28 DIAGNOSIS — F41.8 ANXIETY ABOUT HEALTH: Primary | ICD-10-CM

## 2023-02-28 DIAGNOSIS — H25.9 AGE-RELATED CATARACT OF BOTH EYES, UNSPECIFIED AGE-RELATED CATARACT TYPE: ICD-10-CM

## 2023-02-28 DIAGNOSIS — M16.11 PRIMARY OSTEOARTHRITIS OF RIGHT HIP: ICD-10-CM

## 2023-02-28 PROBLEM — R45.89 ANXIETY ABOUT HEALTH: Status: ACTIVE | Noted: 2023-02-28

## 2023-02-28 PROCEDURE — G9899 SCRN MAM PERF RSLTS DOC: HCPCS | Performed by: FAMILY MEDICINE

## 2023-02-28 PROCEDURE — 1101F PT FALLS ASSESS-DOCD LE1/YR: CPT | Performed by: FAMILY MEDICINE

## 2023-02-28 PROCEDURE — G2025 DIS SITE TELE SVCS RHC/FQHC: HCPCS | Performed by: FAMILY MEDICINE

## 2023-02-28 RX ORDER — BUSPIRONE HYDROCHLORIDE 10 MG/1
10 TABLET ORAL 3 TIMES DAILY
Qty: 90 TABLET | Refills: 2 | Status: SHIPPED | OUTPATIENT
Start: 2023-02-28

## 2023-02-28 NOTE — PROGRESS NOTES
1. \"Have you been to the ER, urgent care clinic since your last visit? Hospitalized since your last visit? \" No    2. \"Have you seen or consulted any other health care providers outside of the [address] [place] [address] since your last visit? \" No     3. For patients aged 39-70: Has the patient had a colonoscopy / FIT/ Cologuard? Yes - no Care Gap present     If the patient is female:    4. For patients aged 41-77: Has the patient had a mammogram within the past 2 years? Yes - no Care Gap present    5. For patients aged 21-65: Has the patient had a pap smear?  No

## 2023-02-28 NOTE — PROGRESS NOTES
Naseem Topete is a 72 y.o. female, evaluated via audio-only technology on 2/28/2023 for Anxiety  . Assessment & Plan:   Diagnoses and all orders for this visit:    1. Anxiety about health  -     busPIRone (BUSPAR) 10 mg tablet; Take 1 Tablet by mouth three (3) times daily. 2. Primary osteoarthritis of right hip    3. Age-related cataract of both eyes, unspecified age-related cataract type      12  Subjective:   Patient is having a great deal of anxiety about her health and her 's health that she has recently had major surgery and she is being lined up for a hip replacement on the right side because of her osteoarthritis causing her pain and she will be needing age-related cataracts removed in the future and all of this is weighing heavily upon her. Patient is having 2 glasses of wine at night and states she is having trouble sleeping as she gets to sleep but then wakes up in 2 or 3 hours and cannot get back to sleep. Prior to Admission medications    Medication Sig Start Date End Date Taking? Authorizing Provider   busPIRone (BUSPAR) 10 mg tablet Take 1 Tablet by mouth three (3) times daily. 2/28/23  Yes Lynn Cyr MD   traZODone (DESYREL) 100 mg tablet TAKE 1 TABLET BY MOUTH NIGHTLY 11/8/22  Yes Lynn Cyr MD   rosuvastatin (CRESTOR) 20 mg tablet Take 1 Tablet by mouth nightly. 11/8/22  Yes Lynn Cyr MD   naproxen (NAPROSYN) 500 mg tablet Take 1 Tablet by mouth two (2) times daily (with meals). 11/8/22  Yes Lynn Cyr MD   losartan (COZAAR) 50 mg tablet Take 1 Tablet by mouth daily. 11/8/22  Yes Lynn Cyr MD   amLODIPine (NORVASC) 5 mg tablet Take 1 Tablet by mouth daily. 11/8/22  Yes Lynn Cyr MD   cream base no. 157 (Q-DERM) Take 15 mg by mouth daily. 10/23/19  Yes Provider, Historical   CALCIUM-MAGNESIUM-ZINC PO Take  by mouth. Yes Provider, Historical   Cetirizine 10 mg cap Take  by mouth.    Yes Provider, Historical multivit-minerals/folic acid (CENTRUM VITAMINTS PO) Take  by mouth. Yes Provider, Historical   omega-3 fatty acids-vitamin e 1,000 mg cap Take 1 Cap by mouth two (2) times a day. Yes Ethan Vega PA   loratadine (CLARITIN) 10 mg tablet Take 1 Tab by mouth daily. Patient taking differently: Take 10 mg by mouth daily as needed. 4/7/16  Yes Ethan Vega PA   calcium-cholecalciferol, D3, (CALTRATE 600+D) tablet Take 1 Tab by mouth two (2) times a day.    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    Former tobacco use Z87.891    Grief at loss of child F43.21, Z63.4    Primary osteoarthritis of right hip M16.11    Essential hypertension I10    Mixed hyperlipidemia E78.2    Anxiety about health F41.8     Patient Active Problem List    Diagnosis Date Noted    Anxiety about health 02/28/2023    Essential hypertension 03/29/2022    Mixed hyperlipidemia 03/29/2022    Primary osteoarthritis of right hip 03/19/2021    Grief at loss of child 03/29/2019    Former tobacco use 03/09/2017    FHx: breast cancer in first degree relative 07/08/2016    Oral dysplasia, acquired 07/01/2016    Heart murmur 04/07/2016    Hx of iron deficiency anemia 01/20/2016    Pneumonia, organism unspecified(486) 03/21/2014    Skin cancer     Screening for osteoporosis 07/16/2013    Osteopenia 07/16/2013    Hx of screening mammography 06/27/2013    Pap smear for cervical cancer screening 06/27/2013    Post-menopausal 06/26/2013     Current Outpatient Medications   Medication Sig Dispense Refill    busPIRone (BUSPAR) 10 mg tablet Take 1 Tablet by mouth three (3) times daily. 90 Tablet 2    traZODone (DESYREL) 100 mg tablet TAKE 1 TABLET BY MOUTH NIGHTLY 90 Tablet 1    rosuvastatin (CRESTOR) 20 mg tablet Take 1 Tablet by mouth nightly. 90 Tablet 1    naproxen (NAPROSYN) 500 mg tablet Take 1 Tablet by mouth two (2) times daily (with meals). 180 Tablet 1    losartan (COZAAR) 50 mg tablet Take 1 Tablet by mouth daily. 90 Tablet 1    amLODIPine (NORVASC) 5 mg tablet Take 1 Tablet by mouth daily. 90 Tablet 1    cream base no. 157 (Q-DERM) Take 15 mg by mouth daily. CALCIUM-MAGNESIUM-ZINC PO Take  by mouth. Cetirizine 10 mg cap Take  by mouth.      multivit-minerals/folic acid (CENTRUM VITAMINTS PO) Take  by mouth. omega-3 fatty acids-vitamin e 1,000 mg cap Take 1 Cap by mouth two (2) times a day. loratadine (CLARITIN) 10 mg tablet Take 1 Tab by mouth daily. (Patient taking differently: Take 10 mg by mouth daily as needed.) 1 Tab 0    calcium-cholecalciferol, D3, (CALTRATE 600+D) tablet Take 1 Tab by mouth two (2) times a day.        Allergies   Allergen Reactions    Ace Inhibitors Cough    Lipitor [Atorvastatin] Other (comments)     Elevated CK     Past Medical History:   Diagnosis Date    Basal cell carcinoma of skin     Benign essential hypertension 10/19/2016    Colon polyps     Elevated CK 3/25/15    mild    Heart murmur 4/7/16    echo showed trivial MR and trivial TR    Hemorrhoids     per Dr. Mayra Santos    Hyperlipidemia LDL goal < 100     Hypertension     Oral dysplasia, acquired 07/2016    Dr. Bg Villatoro    Osteopenia 7/2013    Pneumonia 3/21/14    Tobacco dependence     quit 3/19/14, restarted 5/2014     Past Surgical History:   Procedure Laterality Date    ENDOSCOPY, COLON, DIAGNOSTIC  2010, 5/23/14    colon polyps seen on both studies, repeat every 5 years (Dr. Feliciano Nash)    HX CHOLECYSTECTOMY      HX GYN      tubal ligation    HX ORTHOPAEDIC  1995    foot bunion removal, mortons neuroma; [facility]    HX OTHER SURGICAL MOHS, [facility] (Dr. Suni Schulte)     Family History   Problem Relation Age of Onset    OSTEOARTHRITIS Mother     Heart Disease Mother     Heart Attack Mother         72    Osteoporosis Mother     Hypertension Mother     Stroke Father     Heart Disease Father     Hypertension Father     Breast Cancer Sister         dx age 48    Hypertension Daughter      Social History     Tobacco Use    Smoking status: Every Day     Packs/day: 0.75     Years: 43.00     Pack years: 32.25     Types: Cigarettes     Last attempt to quit: 10/3/2016     Years since quittin.4    Smokeless tobacco: Current   Substance Use Topics    Alcohol use: Yes     Alcohol/week: 14.0 standard drinks     Types: 14 Shots of liquor per week       Review of Systems   Constitutional:  Negative for chills, fever, malaise/fatigue and weight loss. HENT:  Negative for congestion, hearing loss, sore throat and tinnitus. Eyes:  Positive for blurred vision. Negative for pain and discharge. Respiratory:  Negative for cough, shortness of breath and wheezing. Cardiovascular:  Negative for chest pain, palpitations, orthopnea, claudication and leg swelling. Gastrointestinal:  Negative for abdominal pain, constipation and heartburn. Genitourinary:  Negative for dysuria, frequency and urgency. Musculoskeletal:  Positive for joint pain. Negative for falls and myalgias. Skin:  Negative for itching and rash. Neurological:  Negative for dizziness, tingling, tremors and headaches. Endo/Heme/Allergies:  Negative for environmental allergies and polydipsia. Psychiatric/Behavioral:  Negative for depression and substance abuse. The patient is nervous/anxious and has insomnia. No data recorded     Brittni Yoon was evaluated through a patient-initiated, synchronous (real-time) audio only encounter.  She (or guardian if applicable) is aware that it is a billable service, which includes applicable co-pays, with coverage as determined by her insurance carrier. This visit was conducted with the patient's (and/or Ronald Walton guardian's) verbal consent. She has not had a related appointment within my department in the past 7 days or scheduled within the next 24 hours. The patient was located in a state where the provider was licensed to provide care. The patient was located at: Home: [address] [place], [place] [id number]  The provider was located at:  Facility (Appt Department): [address] [place] [phone]    Total Time: minutes: 11-20 minutes    Ana Maria Davis MD

## 2023-05-12 ENCOUNTER — TELEPHONE (OUTPATIENT)
Age: 73
End: 2023-05-12

## 2023-05-12 ENCOUNTER — OFFICE VISIT (OUTPATIENT)
Age: 73
End: 2023-05-12
Payer: MEDICARE

## 2023-05-12 VITALS
HEART RATE: 87 BPM | WEIGHT: 124 LBS | RESPIRATION RATE: 20 BRPM | SYSTOLIC BLOOD PRESSURE: 136 MMHG | HEIGHT: 65 IN | DIASTOLIC BLOOD PRESSURE: 70 MMHG | TEMPERATURE: 98.8 F | OXYGEN SATURATION: 95 % | BODY MASS INDEX: 20.66 KG/M2

## 2023-05-12 DIAGNOSIS — L23.7 POISON IVY DERMATITIS: Primary | ICD-10-CM

## 2023-05-12 PROCEDURE — 3078F DIAST BP <80 MM HG: CPT | Performed by: FAMILY MEDICINE

## 2023-05-12 PROCEDURE — 3075F SYST BP GE 130 - 139MM HG: CPT | Performed by: FAMILY MEDICINE

## 2023-05-12 PROCEDURE — 3017F COLORECTAL CA SCREEN DOC REV: CPT | Performed by: FAMILY MEDICINE

## 2023-05-12 PROCEDURE — G8420 CALC BMI NORM PARAMETERS: HCPCS | Performed by: FAMILY MEDICINE

## 2023-05-12 PROCEDURE — 99213 OFFICE O/P EST LOW 20 MIN: CPT | Performed by: FAMILY MEDICINE

## 2023-05-12 PROCEDURE — 1123F ACP DISCUSS/DSCN MKR DOCD: CPT | Performed by: FAMILY MEDICINE

## 2023-05-12 PROCEDURE — G8428 CUR MEDS NOT DOCUMENT: HCPCS | Performed by: FAMILY MEDICINE

## 2023-05-12 PROCEDURE — G8399 PT W/DXA RESULTS DOCUMENT: HCPCS | Performed by: FAMILY MEDICINE

## 2023-05-12 PROCEDURE — 4004F PT TOBACCO SCREEN RCVD TLK: CPT | Performed by: FAMILY MEDICINE

## 2023-05-12 PROCEDURE — 1090F PRES/ABSN URINE INCON ASSESS: CPT | Performed by: FAMILY MEDICINE

## 2023-05-12 RX ORDER — PREDNISONE 20 MG/1
TABLET ORAL
Qty: 30 TABLET | Refills: 0 | Status: SHIPPED | OUTPATIENT
Start: 2023-05-12

## 2023-05-12 RX ORDER — CHLORAL HYDRATE 500 MG
1 CAPSULE ORAL DAILY
COMMUNITY

## 2023-05-12 RX ORDER — PREDNISONE 20 MG/1
TABLET ORAL
Qty: 30 TABLET | Refills: 0 | Status: CANCELLED | OUTPATIENT
Start: 2023-05-12

## 2023-05-12 SDOH — ECONOMIC STABILITY: FOOD INSECURITY: WITHIN THE PAST 12 MONTHS, THE FOOD YOU BOUGHT JUST DIDN'T LAST AND YOU DIDN'T HAVE MONEY TO GET MORE.: NEVER TRUE

## 2023-05-12 SDOH — ECONOMIC STABILITY: HOUSING INSECURITY
IN THE LAST 12 MONTHS, WAS THERE A TIME WHEN YOU DID NOT HAVE A STEADY PLACE TO SLEEP OR SLEPT IN A SHELTER (INCLUDING NOW)?: NO

## 2023-05-12 SDOH — ECONOMIC STABILITY: FOOD INSECURITY: WITHIN THE PAST 12 MONTHS, YOU WORRIED THAT YOUR FOOD WOULD RUN OUT BEFORE YOU GOT MONEY TO BUY MORE.: NEVER TRUE

## 2023-05-12 ASSESSMENT — ENCOUNTER SYMPTOMS
EYE ITCHING: 1
SINUS PRESSURE: 0
BLOOD IN STOOL: 0
CONSTIPATION: 0
CHEST TIGHTNESS: 0
SHORTNESS OF BREATH: 0
ANAL BLEEDING: 0
EYE REDNESS: 0
NAUSEA: 0
COUGH: 0
FACIAL SWELLING: 0
BACK PAIN: 0
WHEEZING: 0
VOICE CHANGE: 0
ABDOMINAL DISTENTION: 0
DIARRHEA: 0
SORE THROAT: 0
ABDOMINAL PAIN: 0
RHINORRHEA: 0
EYE PAIN: 0
COLOR CHANGE: 0

## 2023-05-12 ASSESSMENT — PATIENT HEALTH QUESTIONNAIRE - PHQ9
SUM OF ALL RESPONSES TO PHQ QUESTIONS 1-9: 0
SUM OF ALL RESPONSES TO PHQ9 QUESTIONS 1 & 2: 0
1. LITTLE INTEREST OR PLEASURE IN DOING THINGS: 0
SUM OF ALL RESPONSES TO PHQ QUESTIONS 1-9: 0
SUM OF ALL RESPONSES TO PHQ QUESTIONS 1-9: 0
2. FEELING DOWN, DEPRESSED OR HOPELESS: 0
SUM OF ALL RESPONSES TO PHQ QUESTIONS 1-9: 0

## 2023-05-12 NOTE — PROGRESS NOTES
1. \"Have you been to the ER, urgent care clinic since your last visit? Hospitalized since your last visit? \" No    2. \"Have you seen or consulted any other health care providers outside of the [address] [place] [address] since your last visit? \" No     3. For patients aged 39-70: Has the patient had a colonoscopy / FIT/ Cologuard? Yes - no Care Gap present     If the patient is female:    4. For patients aged 41-77: Has the patient had a mammogram within the past 2 years? Yes - no Care Gap present    5. For patients aged 21-65: Has the patient had a pap smear?  NA - based on age
MD Loida

## 2023-05-19 ENCOUNTER — TELEPHONE (OUTPATIENT)
Age: 73
End: 2023-05-19

## 2023-05-24 ENCOUNTER — TELEPHONE (OUTPATIENT)
Age: 73
End: 2023-05-24

## 2023-05-25 DIAGNOSIS — F41.8 OTHER SPECIFIED ANXIETY DISORDERS: ICD-10-CM

## 2023-05-25 RX ORDER — BUSPIRONE HYDROCHLORIDE 10 MG/1
TABLET ORAL
Qty: 90 TABLET | Refills: 2 | Status: SHIPPED | OUTPATIENT
Start: 2023-05-25

## 2023-05-31 ENCOUNTER — OFFICE VISIT (OUTPATIENT)
Age: 73
End: 2023-05-31
Payer: MEDICARE

## 2023-05-31 VITALS
HEIGHT: 65 IN | WEIGHT: 124 LBS | RESPIRATION RATE: 20 BRPM | OXYGEN SATURATION: 97 % | TEMPERATURE: 98.1 F | DIASTOLIC BLOOD PRESSURE: 70 MMHG | SYSTOLIC BLOOD PRESSURE: 130 MMHG | HEART RATE: 82 BPM | BODY MASS INDEX: 20.66 KG/M2

## 2023-05-31 DIAGNOSIS — T50.905A DRUG SIDE EFFECTS, INITIAL ENCOUNTER: Primary | ICD-10-CM

## 2023-05-31 DIAGNOSIS — D72.823 LEUKEMOID REACTION: ICD-10-CM

## 2023-05-31 PROCEDURE — G8427 DOCREV CUR MEDS BY ELIG CLIN: HCPCS | Performed by: FAMILY MEDICINE

## 2023-05-31 PROCEDURE — 1123F ACP DISCUSS/DSCN MKR DOCD: CPT | Performed by: FAMILY MEDICINE

## 2023-05-31 PROCEDURE — 99213 OFFICE O/P EST LOW 20 MIN: CPT | Performed by: FAMILY MEDICINE

## 2023-05-31 PROCEDURE — 4004F PT TOBACCO SCREEN RCVD TLK: CPT | Performed by: FAMILY MEDICINE

## 2023-05-31 PROCEDURE — 3075F SYST BP GE 130 - 139MM HG: CPT | Performed by: FAMILY MEDICINE

## 2023-05-31 PROCEDURE — 1090F PRES/ABSN URINE INCON ASSESS: CPT | Performed by: FAMILY MEDICINE

## 2023-05-31 PROCEDURE — 3078F DIAST BP <80 MM HG: CPT | Performed by: FAMILY MEDICINE

## 2023-05-31 PROCEDURE — 3017F COLORECTAL CA SCREEN DOC REV: CPT | Performed by: FAMILY MEDICINE

## 2023-05-31 PROCEDURE — G8420 CALC BMI NORM PARAMETERS: HCPCS | Performed by: FAMILY MEDICINE

## 2023-05-31 PROCEDURE — G8399 PT W/DXA RESULTS DOCUMENT: HCPCS | Performed by: FAMILY MEDICINE

## 2023-05-31 SDOH — ECONOMIC STABILITY: FOOD INSECURITY: WITHIN THE PAST 12 MONTHS, THE FOOD YOU BOUGHT JUST DIDN'T LAST AND YOU DIDN'T HAVE MONEY TO GET MORE.: NEVER TRUE

## 2023-05-31 SDOH — ECONOMIC STABILITY: FOOD INSECURITY: WITHIN THE PAST 12 MONTHS, YOU WORRIED THAT YOUR FOOD WOULD RUN OUT BEFORE YOU GOT MONEY TO BUY MORE.: NEVER TRUE

## 2023-05-31 SDOH — ECONOMIC STABILITY: INCOME INSECURITY: HOW HARD IS IT FOR YOU TO PAY FOR THE VERY BASICS LIKE FOOD, HOUSING, MEDICAL CARE, AND HEATING?: NOT HARD AT ALL

## 2023-05-31 ASSESSMENT — ENCOUNTER SYMPTOMS
RHINORRHEA: 0
NAUSEA: 0
VOICE CHANGE: 0
COLOR CHANGE: 0
DIARRHEA: 0
ANAL BLEEDING: 0
SORE THROAT: 0
EYE PAIN: 0
BLOOD IN STOOL: 0
FACIAL SWELLING: 0
SINUS PRESSURE: 0
CHEST TIGHTNESS: 0
COUGH: 0
BACK PAIN: 0
CONSTIPATION: 0
WHEEZING: 0
EYE REDNESS: 0
ABDOMINAL PAIN: 0
SHORTNESS OF BREATH: 0
ABDOMINAL DISTENTION: 0

## 2023-05-31 ASSESSMENT — PATIENT HEALTH QUESTIONNAIRE - PHQ9
1. LITTLE INTEREST OR PLEASURE IN DOING THINGS: 1
SUM OF ALL RESPONSES TO PHQ QUESTIONS 1-9: 1
SUM OF ALL RESPONSES TO PHQ QUESTIONS 1-9: 1
SUM OF ALL RESPONSES TO PHQ9 QUESTIONS 1 & 2: 1
SUM OF ALL RESPONSES TO PHQ QUESTIONS 1-9: 1
2. FEELING DOWN, DEPRESSED OR HOPELESS: 0
SUM OF ALL RESPONSES TO PHQ QUESTIONS 1-9: 1

## 2023-05-31 ASSESSMENT — ANXIETY QUESTIONNAIRES
GAD7 TOTAL SCORE: 0
IF YOU CHECKED OFF ANY PROBLEMS ON THIS QUESTIONNAIRE, HOW DIFFICULT HAVE THESE PROBLEMS MADE IT FOR YOU TO DO YOUR WORK, TAKE CARE OF THINGS AT HOME, OR GET ALONG WITH OTHER PEOPLE: NOT DIFFICULT AT ALL
1. FEELING NERVOUS, ANXIOUS, OR ON EDGE: 0
4. TROUBLE RELAXING: 0
6. BECOMING EASILY ANNOYED OR IRRITABLE: 0
7. FEELING AFRAID AS IF SOMETHING AWFUL MIGHT HAPPEN: 0
3. WORRYING TOO MUCH ABOUT DIFFERENT THINGS: 0
5. BEING SO RESTLESS THAT IT IS HARD TO SIT STILL: 0
2. NOT BEING ABLE TO STOP OR CONTROL WORRYING: 0

## 2023-05-31 NOTE — PROGRESS NOTES
Dennise Winter is a 72 y.o. female who presents with the following:  Chief Complaint   Patient presents with    Abnormal Lab     Leukocytosis       Patient has been treated with prednisone for 8 days for a very bad case of poison ivy dermatitis and during that time had a CBC which demonstrated a white count a little over 13,000 which disturbed her surgeons who are proposing a hip replacement. They requested the patient follow-up with me to make sure that this was not an infection. Allergies   Allergen Reactions    Ace Inhibitors Cough    Atorvastatin Other (See Comments)     Elevated CK       Current Outpatient Medications   Medication Sig Dispense Refill    busPIRone (BUSPAR) 10 MG tablet TAKE 1 TABLET BY MOUTH THREE TIMES A DAY 90 tablet 2    Omega-3 Fatty Acids (FISH OIL) 1000 MG capsule Take 1 capsule by mouth daily      CALCIUM-MAGNESIUM-ZINC PO Take by mouth      amLODIPine (NORVASC) 5 MG tablet Take by mouth daily      calcium carb-cholecalciferol 600-10 MG-MCG TABS per tab Take 1 tablet by mouth 2 times daily      Cetirizine HCl 10 MG CAPS Take by mouth      loratadine (CLARITIN) 10 MG tablet Take by mouth daily      losartan (COZAAR) 50 MG tablet Take by mouth daily      naproxen (NAPROSYN) 500 MG tablet Take by mouth 2 times daily (with meals)      rosuvastatin (CRESTOR) 20 MG tablet Take by mouth      traZODone (DESYREL) 100 MG tablet TAKE 1 TABLET BY MOUTH NIGHTLY       No current facility-administered medications for this visit.         Past Medical History:   Diagnosis Date    Basal cell carcinoma of skin     Benign essential hypertension 10/19/2016    Colon polyps     Elevated CK 3/25/15    mild    Heart murmur 4/7/16    echo showed trivial MR and trivial TR    Hemorrhoids     per Dr. Dorothy Tejeda    Hyperlipidemia LDL goal < 100     Hypertension     Oral dysplasia, acquired 07/2016    Dr. Juarez Serum    Osteopenia 7/2013    Pneumonia 3/21/14    Tobacco dependence     quit 3/19/14, restarted 5/2014

## 2023-06-01 LAB
BASOPHILS # BLD: 0.1 K/UL (ref 0–0.1)
BASOPHILS NFR BLD: 1 % (ref 0–1)
DIFFERENTIAL METHOD BLD: NORMAL
EOSINOPHIL # BLD: 0.1 K/UL (ref 0–0.4)
EOSINOPHIL NFR BLD: 1 % (ref 0–7)
ERYTHROCYTE [DISTWIDTH] IN BLOOD BY AUTOMATED COUNT: 12.9 % (ref 11.5–14.5)
HCT VFR BLD AUTO: 42.1 % (ref 35–47)
HGB BLD-MCNC: 13.9 G/DL (ref 11.5–16)
IMM GRANULOCYTES # BLD AUTO: 0 K/UL (ref 0–0.04)
IMM GRANULOCYTES NFR BLD AUTO: 0 % (ref 0–0.5)
LYMPHOCYTES # BLD: 3.4 K/UL (ref 0.8–3.5)
LYMPHOCYTES NFR BLD: 34 % (ref 12–49)
MCH RBC QN AUTO: 31.2 PG (ref 26–34)
MCHC RBC AUTO-ENTMCNC: 33 G/DL (ref 30–36.5)
MCV RBC AUTO: 94.4 FL (ref 80–99)
MONOCYTES # BLD: 0.8 K/UL (ref 0–1)
MONOCYTES NFR BLD: 8 % (ref 5–13)
NEUTS SEG # BLD: 5.5 K/UL (ref 1.8–8)
NEUTS SEG NFR BLD: 56 % (ref 32–75)
NRBC # BLD: 0 K/UL (ref 0–0.01)
NRBC BLD-RTO: 0 PER 100 WBC
PLATELET # BLD AUTO: 265 K/UL (ref 150–400)
PMV BLD AUTO: 9.9 FL (ref 8.9–12.9)
RBC # BLD AUTO: 4.46 M/UL (ref 3.8–5.2)
WBC # BLD AUTO: 10 K/UL (ref 3.6–11)

## 2023-06-08 DIAGNOSIS — I10 ESSENTIAL (PRIMARY) HYPERTENSION: ICD-10-CM

## 2023-06-08 DIAGNOSIS — F43.21 ADJUSTMENT DISORDER WITH DEPRESSED MOOD: ICD-10-CM

## 2023-06-08 DIAGNOSIS — E78.2 MIXED HYPERLIPIDEMIA: ICD-10-CM

## 2023-06-08 NOTE — TELEPHONE ENCOUNTER
Office Visit on 05/31/2023   Component Date Value Ref Range Status    WBC 05/31/2023 10.0  3.6 - 11.0 K/uL Final    RBC 05/31/2023 4.46  3.80 - 5.20 M/uL Final    Hemoglobin 05/31/2023 13.9  11.5 - 16.0 g/dL Final    Hematocrit 05/31/2023 42.1  35.0 - 47.0 % Final    MCV 05/31/2023 94.4  80.0 - 99.0 FL Final    MCH 05/31/2023 31.2  26.0 - 34.0 PG Final    MCHC 05/31/2023 33.0  30.0 - 36.5 g/dL Final    RDW 05/31/2023 12.9  11.5 - 14.5 % Final    Platelets 04/39/6732 265  150 - 400 K/uL Final    MPV 05/31/2023 9.9  8.9 - 12.9 FL Final    Nucleated RBCs 05/31/2023 0.0  0  WBC Final    nRBC 05/31/2023 0.00  0.00 - 0.01 K/uL Final    Neutrophils % 05/31/2023 56  32 - 75 % Final    Lymphocytes % 05/31/2023 34  12 - 49 % Final    Monocytes % 05/31/2023 8  5 - 13 % Final    Eosinophils % 05/31/2023 1  0 - 7 % Final    Basophils % 05/31/2023 1  0 - 1 % Final    Immature Granulocytes 05/31/2023 0  0.0 - 0.5 % Final    Neutrophils Absolute 05/31/2023 5.5  1.8 - 8.0 K/UL Final    Lymphocytes Absolute 05/31/2023 3.4  0.8 - 3.5 K/UL Final    Monocytes Absolute 05/31/2023 0.8  0.0 - 1.0 K/UL Final    Eosinophils Absolute 05/31/2023 0.1  0.0 - 0.4 K/UL Final    Basophils Absolute 05/31/2023 0.1  0.0 - 0.1 K/UL Final    Absolute Immature Granulocyte 05/31/2023 0.0  0.00 - 0.04 K/UL Final    Differential Type 05/31/2023 AUTOMATED    Final

## 2023-06-09 RX ORDER — TRAZODONE HYDROCHLORIDE 100 MG/1
TABLET ORAL
Qty: 90 TABLET | Refills: 1 | Status: SHIPPED | OUTPATIENT
Start: 2023-06-09

## 2023-06-09 RX ORDER — LOSARTAN POTASSIUM 50 MG/1
TABLET ORAL
Qty: 90 TABLET | Refills: 1 | Status: SHIPPED | OUTPATIENT
Start: 2023-06-09

## 2023-06-09 RX ORDER — ROSUVASTATIN CALCIUM 20 MG/1
TABLET, COATED ORAL
Qty: 90 TABLET | Refills: 1 | Status: SHIPPED | OUTPATIENT
Start: 2023-06-09

## 2023-06-09 RX ORDER — AMLODIPINE BESYLATE 5 MG/1
TABLET ORAL
Qty: 90 TABLET | Refills: 1 | Status: SHIPPED | OUTPATIENT
Start: 2023-06-09

## 2023-11-17 ENCOUNTER — NURSE TRIAGE (OUTPATIENT)
Dept: OTHER | Facility: CLINIC | Age: 73
End: 2023-11-17

## 2023-11-17 NOTE — TELEPHONE ENCOUNTER
[place]    Received call from [address] at [facility] with [employer] Complaint. Subjective: Caller states \"I have watery eye/nose and sinus congestion, feel tired and tightness upper chest, also thinks she has anxiety going on. \"     Current Symptoms: Intermittent watery eyes,clear, runny nose; sinus congestion, fatigue, upper chest tightness, anxious, overwhelmed, tense, depressed, trouble concentrating, + stress; mind won't shut off when she wakes up during the night    Denies any Si or HI, CP/tightness/pressure, SOB, difficulty breathing, headache, dizziness, N/V/D, bleeding, urinary/bowel issues, weakness, numbness, or vision issues. +family/friend support; No therapist   Alcohol - several glasses of wine a night - nightly    Onset: 1 month ago; intermittent    Associated Symptoms:  difficulty sleeping    Pain Severity: denies any pain    Temperature: fever or chills    What has been tried: tylenol sinus - stopped taking Buspar 5 months ago    LMP: NA Pregnant: NA    Recommended disposition: See PCP within 3 Days    Care advice provided, patient verbalizes understanding; denies any other questions or concerns; instructed to call back for any new or worsening symptoms. Patient/Caller agrees with recommended disposition; writer provided warm transfer to Vito Pritchard at [facility] for appointment scheduling    Attention Provider: Thank you for allowing me to participate in the care of your patient. The patient was connected to triage in response to information provided to the ECC/PSC. Please do not respond through this encounter as the response is not directed to a shared pool. Reason for Disposition   MODERATE anxiety (e.g., persistent or frequent anxiety symptoms; interferes with sleep, school, or work)    Protocols used:  Anxiety and Panic Attack-ADULT-[place]

## 2023-11-20 ENCOUNTER — OFFICE VISIT (OUTPATIENT)
Age: 73
End: 2023-11-20
Payer: COMMERCIAL

## 2023-11-20 VITALS
HEIGHT: 65 IN | DIASTOLIC BLOOD PRESSURE: 77 MMHG | RESPIRATION RATE: 18 BRPM | SYSTOLIC BLOOD PRESSURE: 130 MMHG | TEMPERATURE: 99 F | BODY MASS INDEX: 20.63 KG/M2 | HEART RATE: 98 BPM | OXYGEN SATURATION: 96 %

## 2023-11-20 DIAGNOSIS — R05.1 ACUTE COUGH: ICD-10-CM

## 2023-11-20 DIAGNOSIS — F43.21 ADJUSTMENT DISORDER WITH DEPRESSED MOOD: Primary | ICD-10-CM

## 2023-11-20 LAB
EXP DATE SOLUTION: NORMAL
EXP DATE SWAB: NORMAL
EXPIRATION DATE: NORMAL
LOT NUMBER POC: NORMAL
LOT NUMBER SOLUTION: NORMAL
LOT NUMBER SWAB: NORMAL
SARS-COV-2 RNA, POC: NEGATIVE

## 2023-11-20 PROCEDURE — 3074F SYST BP LT 130 MM HG: CPT | Performed by: NURSE PRACTITIONER

## 2023-11-20 PROCEDURE — 87635 SARS-COV-2 COVID-19 AMP PRB: CPT | Performed by: NURSE PRACTITIONER

## 2023-11-20 PROCEDURE — 1123F ACP DISCUSS/DSCN MKR DOCD: CPT | Performed by: NURSE PRACTITIONER

## 2023-11-20 PROCEDURE — 3078F DIAST BP <80 MM HG: CPT | Performed by: NURSE PRACTITIONER

## 2023-11-20 PROCEDURE — 99214 OFFICE O/P EST MOD 30 MIN: CPT | Performed by: NURSE PRACTITIONER

## 2023-11-20 RX ORDER — ESCITALOPRAM OXALATE 5 MG/1
5 TABLET ORAL DAILY
Qty: 90 TABLET | Refills: 2 | Status: SHIPPED | OUTPATIENT
Start: 2023-11-20

## 2023-11-20 RX ORDER — MONTELUKAST SODIUM 10 MG/1
10 TABLET ORAL DAILY
Qty: 90 TABLET | Refills: 1 | Status: SHIPPED | OUTPATIENT
Start: 2023-11-20

## 2023-11-20 RX ORDER — PREDNISONE 10 MG/1
10 TABLET ORAL 2 TIMES DAILY
Qty: 10 TABLET | Refills: 0 | Status: SHIPPED | OUTPATIENT
Start: 2023-11-20 | End: 2023-11-25

## 2023-11-20 RX ORDER — TRAZODONE HYDROCHLORIDE 50 MG/1
50 TABLET ORAL NIGHTLY
Qty: 90 TABLET | Refills: 1 | Status: SHIPPED | OUTPATIENT
Start: 2023-11-20

## 2023-11-20 RX ORDER — FLUTICASONE PROPIONATE 50 MCG
2 SPRAY, SUSPENSION (ML) NASAL DAILY
Qty: 16 G | Refills: 5 | Status: SHIPPED | OUTPATIENT
Start: 2023-11-20

## 2023-11-20 ASSESSMENT — PATIENT HEALTH QUESTIONNAIRE - PHQ9
1. LITTLE INTEREST OR PLEASURE IN DOING THINGS: 1
2. FEELING DOWN, DEPRESSED OR HOPELESS: 1
SUM OF ALL RESPONSES TO PHQ QUESTIONS 1-9: 2
SUM OF ALL RESPONSES TO PHQ9 QUESTIONS 1 & 2: 2
SUM OF ALL RESPONSES TO PHQ QUESTIONS 1-9: 2

## 2023-11-21 ASSESSMENT — ENCOUNTER SYMPTOMS
COLOR CHANGE: 0
WHEEZING: 0
SHORTNESS OF BREATH: 0
COUGH: 1
BACK PAIN: 0
EYE DISCHARGE: 0
ABDOMINAL DISTENTION: 0

## 2023-11-25 DIAGNOSIS — F43.21 ADJUSTMENT DISORDER WITH DEPRESSED MOOD: ICD-10-CM

## 2023-11-27 RX ORDER — TRAZODONE HYDROCHLORIDE 100 MG/1
100 TABLET ORAL NIGHTLY
Qty: 90 TABLET | Refills: 1 | OUTPATIENT
Start: 2023-11-27

## 2023-12-01 ENCOUNTER — APPOINTMENT (OUTPATIENT)
Facility: HOSPITAL | Age: 73
End: 2023-12-01
Payer: MEDICARE

## 2023-12-01 ENCOUNTER — HOSPITAL ENCOUNTER (EMERGENCY)
Facility: HOSPITAL | Age: 73
Discharge: HOME | End: 2023-12-01
Attending: EMERGENCY MEDICINE
Payer: MEDICARE

## 2023-12-01 VITALS
DIASTOLIC BLOOD PRESSURE: 79 MMHG | HEART RATE: 76 BPM | SYSTOLIC BLOOD PRESSURE: 163 MMHG | TEMPERATURE: 97.8 F | RESPIRATION RATE: 13 BRPM

## 2023-12-01 DIAGNOSIS — R07.89 CHEST DISCOMFORT: Primary | ICD-10-CM

## 2023-12-01 LAB
ALBUMIN SERPL-MCNC: 4 G/DL (ref 3.5–5)
ALBUMIN/GLOB SERPL: 1.3 (ref 1.1–2.2)
ALP SERPL-CCNC: 59 U/L (ref 45–117)
ALT SERPL-CCNC: 31 U/L (ref 12–78)
ANION GAP SERPL CALC-SCNC: 8 MMOL/L (ref 5–15)
AST SERPL-CCNC: 20 U/L (ref 15–37)
BASOPHILS # BLD: 0.1 K/UL (ref 0–0.1)
BASOPHILS NFR BLD: 1 % (ref 0–1)
BILIRUB SERPL-MCNC: 0.4 MG/DL (ref 0.2–1)
BUN SERPL-MCNC: 10 MG/DL (ref 6–20)
BUN/CREAT SERPL: 19 (ref 12–20)
CALCIUM SERPL-MCNC: 9.3 MG/DL (ref 8.5–10.1)
CHLORIDE SERPL-SCNC: 104 MMOL/L (ref 97–108)
CO2 SERPL-SCNC: 29 MMOL/L (ref 21–32)
CREAT SERPL-MCNC: 0.54 MG/DL (ref 0.55–1.02)
DIFFERENTIAL METHOD BLD: ABNORMAL
EKG ATRIAL RATE: 78 BPM
EKG DIAGNOSIS: NORMAL
EKG P AXIS: 51 DEGREES
EKG P-R INTERVAL: 150 MS
EKG Q-T INTERVAL: 404 MS
EKG QRS DURATION: 88 MS
EKG QTC CALCULATION (BAZETT): 460 MS
EKG R AXIS: -16 DEGREES
EKG T AXIS: 35 DEGREES
EKG VENTRICULAR RATE: 78 BPM
EOSINOPHIL # BLD: 0.2 K/UL (ref 0–0.4)
EOSINOPHIL NFR BLD: 1 % (ref 0–7)
ERYTHROCYTE [DISTWIDTH] IN BLOOD BY AUTOMATED COUNT: 13.2 % (ref 11.5–14.5)
GLOBULIN SER CALC-MCNC: 3 G/DL (ref 2–4)
GLUCOSE SERPL-MCNC: 100 MG/DL (ref 65–100)
HCT VFR BLD AUTO: 39.3 % (ref 35–47)
HGB BLD-MCNC: 13 G/DL (ref 11.5–16)
IMM GRANULOCYTES # BLD AUTO: 0 K/UL (ref 0–0.04)
IMM GRANULOCYTES NFR BLD AUTO: 0 % (ref 0–0.5)
LYMPHOCYTES # BLD: 3.4 K/UL (ref 0.8–3.5)
LYMPHOCYTES NFR BLD: 28 % (ref 12–49)
MCH RBC QN AUTO: 30 PG (ref 26–34)
MCHC RBC AUTO-ENTMCNC: 33.1 G/DL (ref 30–36.5)
MCV RBC AUTO: 90.6 FL (ref 80–99)
MONOCYTES # BLD: 0.9 K/UL (ref 0–1)
MONOCYTES NFR BLD: 8 % (ref 5–13)
NEUTS SEG # BLD: 7.7 K/UL (ref 1.8–8)
NEUTS SEG NFR BLD: 62 % (ref 32–75)
NRBC # BLD: 0 K/UL (ref 0–0.01)
NRBC BLD-RTO: 0 PER 100 WBC
PLATELET # BLD AUTO: 282 K/UL (ref 150–400)
PMV BLD AUTO: 8.9 FL (ref 8.9–12.9)
POTASSIUM SERPL-SCNC: 3.5 MMOL/L (ref 3.5–5.1)
PROT SERPL-MCNC: 7 G/DL (ref 6.4–8.2)
RBC # BLD AUTO: 4.34 M/UL (ref 3.8–5.2)
SODIUM SERPL-SCNC: 141 MMOL/L (ref 136–145)
TROPONIN I SERPL HS-MCNC: 4 NG/L (ref 0–51)
WBC # BLD AUTO: 12.4 K/UL (ref 3.6–11)

## 2023-12-01 PROCEDURE — 36415 COLL VENOUS BLD VENIPUNCTURE: CPT

## 2023-12-01 PROCEDURE — 71046 X-RAY EXAM CHEST 2 VIEWS: CPT

## 2023-12-01 PROCEDURE — 99285 EMERGENCY DEPT VISIT HI MDM: CPT

## 2023-12-01 PROCEDURE — 80053 COMPREHEN METABOLIC PANEL: CPT

## 2023-12-01 PROCEDURE — 84484 ASSAY OF TROPONIN QUANT: CPT

## 2023-12-01 PROCEDURE — 85025 COMPLETE CBC W/AUTO DIFF WBC: CPT

## 2023-12-01 NOTE — ED NOTES
Written and verbal discharge instructions reviewed with patient. All discharge medications reviewed and explained. Understanding verbalized, all questions answered. Ambulated out, gait steady.        Emmanuel Umanzor RN  12/01/23 [id number]

## 2023-12-04 DIAGNOSIS — I10 ESSENTIAL (PRIMARY) HYPERTENSION: ICD-10-CM

## 2023-12-04 NOTE — TELEPHONE ENCOUNTER
Patient requesting refill on     Requested Prescriptions     Pending Prescriptions Disp Refills    losartan (COZAAR) 50 MG tablet [Pharmacy Med Name: LOSARTAN POTASSIUM 50 MG TAB] 90 tablet 1     Sig: TAKE 1 TABLET BY MOUTH EVERY DAY    amLODIPine (NORVASC) 5 MG tablet [Pharmacy Med Name: AMLODIPINE BESYLATE 5 MG TAB] 90 tablet 1     Sig: TAKE 1 TABLET BY MOUTH EVERY DAY        Last OV 5/31/2023

## 2023-12-05 LAB
EKG ATRIAL RATE: 78 BPM
EKG DIAGNOSIS: NORMAL
EKG P AXIS: 51 DEGREES
EKG P-R INTERVAL: 150 MS
EKG Q-T INTERVAL: 404 MS
EKG QRS DURATION: 88 MS
EKG QTC CALCULATION (BAZETT): 460 MS
EKG R AXIS: -16 DEGREES
EKG T AXIS: 35 DEGREES
EKG VENTRICULAR RATE: 78 BPM

## 2023-12-05 RX ORDER — AMLODIPINE BESYLATE 5 MG/1
TABLET ORAL
Qty: 90 TABLET | Refills: 0 | Status: SHIPPED | OUTPATIENT
Start: 2023-12-05

## 2023-12-05 RX ORDER — LOSARTAN POTASSIUM 50 MG/1
TABLET ORAL
Qty: 90 TABLET | Refills: 0 | Status: SHIPPED | OUTPATIENT
Start: 2023-12-05

## 2023-12-06 DIAGNOSIS — F43.21 ADJUSTMENT DISORDER WITH DEPRESSED MOOD: ICD-10-CM

## 2023-12-06 DIAGNOSIS — E78.2 MIXED HYPERLIPIDEMIA: ICD-10-CM

## 2023-12-07 ENCOUNTER — TELEPHONE (OUTPATIENT)
Age: 73
End: 2023-12-07

## 2023-12-07 DIAGNOSIS — I10 ESSENTIAL (PRIMARY) HYPERTENSION: Primary | ICD-10-CM

## 2023-12-07 DIAGNOSIS — E78.2 MIXED HYPERLIPIDEMIA: ICD-10-CM

## 2023-12-07 RX ORDER — TRAZODONE HYDROCHLORIDE 100 MG/1
100 TABLET ORAL NIGHTLY
Qty: 90 TABLET | Refills: 1 | OUTPATIENT
Start: 2023-12-07

## 2023-12-07 RX ORDER — ROSUVASTATIN CALCIUM 20 MG/1
20 TABLET, COATED ORAL NIGHTLY
Qty: 90 TABLET | Refills: 1 | OUTPATIENT
Start: 2023-12-07

## 2023-12-07 RX ORDER — TRAZODONE HYDROCHLORIDE 50 MG/1
50 TABLET ORAL NIGHTLY
Qty: 90 TABLET | Refills: 1 | Status: SHIPPED | OUTPATIENT
Start: 2023-12-07

## 2023-12-07 RX ORDER — ROSUVASTATIN CALCIUM 20 MG/1
20 TABLET, COATED ORAL NIGHTLY
Qty: 90 TABLET | Refills: 1 | Status: SHIPPED | OUTPATIENT
Start: 2023-12-07

## 2023-12-07 NOTE — TELEPHONE ENCOUNTER
Medication Refill Request    Ana Munoz is requesting a refill of the following medication(s):       Please send refill to:     [facility]/pharmacy [address] [facility], [address] [place] [address] Erick Amin, [address] [facility] [phone] - F [phone]  [address] [place] [address]  Phone: [phone] Fax: [phone]

## 2023-12-08 RX ORDER — ESCITALOPRAM OXALATE 10 MG/1
10 TABLET ORAL DAILY
Qty: 90 TABLET | Refills: 2 | Status: SHIPPED | OUTPATIENT
Start: 2023-12-08

## 2023-12-13 ENCOUNTER — NURSE ONLY (OUTPATIENT)
Age: 73
End: 2023-12-13

## 2023-12-13 VITALS — TEMPERATURE: 98.9 F

## 2023-12-13 DIAGNOSIS — I10 ESSENTIAL (PRIMARY) HYPERTENSION: ICD-10-CM

## 2023-12-13 DIAGNOSIS — Z23 NEEDS FLU SHOT: Primary | ICD-10-CM

## 2023-12-13 PROCEDURE — 90694 VACC AIIV4 NO PRSRV 0.5ML IM: CPT | Performed by: NURSE PRACTITIONER

## 2023-12-13 PROCEDURE — G0008 ADMIN INFLUENZA VIRUS VAC: HCPCS | Performed by: NURSE PRACTITIONER

## 2023-12-13 NOTE — PROGRESS NOTES
Labs drawn in leftPatient was administered Flu shot in left deltoid via IM. Patient tolerated Flu shot well. Medication information reviewed with patient, patient states understanding. Patient to resume routine medications at home. Patient given copy of AVS and VIIS with medication information and instructions for home. VIIS reviewed with patient and patient states understanding. arm per Sarah's orders. Pt tolerated well.

## 2023-12-14 LAB
ALBUMIN SERPL-MCNC: 4 G/DL (ref 3.5–5)
ALBUMIN/GLOB SERPL: 1.4 (ref 1.1–2.2)
ALP SERPL-CCNC: 59 U/L (ref 45–117)
ALT SERPL-CCNC: 26 U/L (ref 12–78)
ANION GAP SERPL CALC-SCNC: 8 MMOL/L (ref 5–15)
AST SERPL-CCNC: 16 U/L (ref 15–37)
BASOPHILS # BLD: 0.1 K/UL (ref 0–0.1)
BASOPHILS NFR BLD: 1 % (ref 0–1)
BILIRUB SERPL-MCNC: 0.4 MG/DL (ref 0.2–1)
BUN SERPL-MCNC: 14 MG/DL (ref 6–20)
BUN/CREAT SERPL: 26 (ref 12–20)
CALCIUM SERPL-MCNC: 9 MG/DL (ref 8.5–10.1)
CHLORIDE SERPL-SCNC: 103 MMOL/L (ref 97–108)
CHOLEST SERPL-MCNC: 180 MG/DL
CO2 SERPL-SCNC: 27 MMOL/L (ref 21–32)
CREAT SERPL-MCNC: 0.53 MG/DL (ref 0.55–1.02)
DIFFERENTIAL METHOD BLD: NORMAL
EOSINOPHIL # BLD: 0.1 K/UL (ref 0–0.4)
EOSINOPHIL NFR BLD: 1 % (ref 0–7)
ERYTHROCYTE [DISTWIDTH] IN BLOOD BY AUTOMATED COUNT: 12.8 % (ref 11.5–14.5)
GLOBULIN SER CALC-MCNC: 2.9 G/DL (ref 2–4)
GLUCOSE SERPL-MCNC: 102 MG/DL (ref 65–100)
HCT VFR BLD AUTO: 42.4 % (ref 35–47)
HDLC SERPL-MCNC: 79 MG/DL
HDLC SERPL: 2.3 (ref 0–5)
HGB BLD-MCNC: 14 G/DL (ref 11.5–16)
IMM GRANULOCYTES # BLD AUTO: 0 K/UL (ref 0–0.04)
IMM GRANULOCYTES NFR BLD AUTO: 0 % (ref 0–0.5)
LDLC SERPL CALC-MCNC: 88 MG/DL (ref 0–100)
LYMPHOCYTES # BLD: 3.4 K/UL (ref 0.8–3.5)
LYMPHOCYTES NFR BLD: 31 % (ref 12–49)
MCH RBC QN AUTO: 30.9 PG (ref 26–34)
MCHC RBC AUTO-ENTMCNC: 33 G/DL (ref 30–36.5)
MCV RBC AUTO: 93.6 FL (ref 80–99)
MONOCYTES # BLD: 0.6 K/UL (ref 0–1)
MONOCYTES NFR BLD: 6 % (ref 5–13)
NEUTS SEG # BLD: 6.6 K/UL (ref 1.8–8)
NEUTS SEG NFR BLD: 61 % (ref 32–75)
NRBC # BLD: 0 K/UL (ref 0–0.01)
NRBC BLD-RTO: 0 PER 100 WBC
PLATELET # BLD AUTO: 259 K/UL (ref 150–400)
PMV BLD AUTO: 10.2 FL (ref 8.9–12.9)
POTASSIUM SERPL-SCNC: 4 MMOL/L (ref 3.5–5.1)
PROT SERPL-MCNC: 6.9 G/DL (ref 6.4–8.2)
RBC # BLD AUTO: 4.53 M/UL (ref 3.8–5.2)
SODIUM SERPL-SCNC: 138 MMOL/L (ref 136–145)
TRIGL SERPL-MCNC: 65 MG/DL
VLDLC SERPL CALC-MCNC: 13 MG/DL
WBC # BLD AUTO: 10.8 K/UL (ref 3.6–11)

## 2024-01-02 ENCOUNTER — OFFICE VISIT (OUTPATIENT)
Age: 74
End: 2024-01-02
Payer: MEDICARE

## 2024-01-02 VITALS
RESPIRATION RATE: 18 BRPM | SYSTOLIC BLOOD PRESSURE: 129 MMHG | WEIGHT: 124.25 LBS | BODY MASS INDEX: 20.7 KG/M2 | OXYGEN SATURATION: 96 % | TEMPERATURE: 97 F | DIASTOLIC BLOOD PRESSURE: 66 MMHG | HEIGHT: 65 IN | HEART RATE: 92 BPM

## 2024-01-02 DIAGNOSIS — J30.9 ALLERGIC RHINITIS, UNSPECIFIED SEASONALITY, UNSPECIFIED TRIGGER: ICD-10-CM

## 2024-01-02 DIAGNOSIS — F41.1 ANXIETY AS ACUTE REACTION TO EXCEPTIONAL STRESS: Primary | ICD-10-CM

## 2024-01-02 DIAGNOSIS — R47.02 DYSPHASIA: ICD-10-CM

## 2024-01-02 DIAGNOSIS — R07.89 CHEST PRESSURE: ICD-10-CM

## 2024-01-02 DIAGNOSIS — F43.0 ANXIETY AS ACUTE REACTION TO EXCEPTIONAL STRESS: Primary | ICD-10-CM

## 2024-01-02 PROCEDURE — 3078F DIAST BP <80 MM HG: CPT | Performed by: NURSE PRACTITIONER

## 2024-01-02 PROCEDURE — 3017F COLORECTAL CA SCREEN DOC REV: CPT | Performed by: NURSE PRACTITIONER

## 2024-01-02 PROCEDURE — G8484 FLU IMMUNIZE NO ADMIN: HCPCS | Performed by: NURSE PRACTITIONER

## 2024-01-02 PROCEDURE — 1090F PRES/ABSN URINE INCON ASSESS: CPT | Performed by: NURSE PRACTITIONER

## 2024-01-02 PROCEDURE — G8420 CALC BMI NORM PARAMETERS: HCPCS | Performed by: NURSE PRACTITIONER

## 2024-01-02 PROCEDURE — 4004F PT TOBACCO SCREEN RCVD TLK: CPT | Performed by: NURSE PRACTITIONER

## 2024-01-02 PROCEDURE — G8399 PT W/DXA RESULTS DOCUMENT: HCPCS | Performed by: NURSE PRACTITIONER

## 2024-01-02 PROCEDURE — 99214 OFFICE O/P EST MOD 30 MIN: CPT | Performed by: NURSE PRACTITIONER

## 2024-01-02 PROCEDURE — 1123F ACP DISCUSS/DSCN MKR DOCD: CPT | Performed by: NURSE PRACTITIONER

## 2024-01-02 PROCEDURE — 3074F SYST BP LT 130 MM HG: CPT | Performed by: NURSE PRACTITIONER

## 2024-01-02 PROCEDURE — G8428 CUR MEDS NOT DOCUMENT: HCPCS | Performed by: NURSE PRACTITIONER

## 2024-01-02 RX ORDER — MIRTAZAPINE 7.5 MG/1
7.5-15 TABLET, FILM COATED ORAL NIGHTLY
Qty: 60 TABLET | Refills: 0 | Status: SHIPPED | OUTPATIENT
Start: 2024-01-02

## 2024-01-02 ASSESSMENT — PATIENT HEALTH QUESTIONNAIRE - PHQ9
SUM OF ALL RESPONSES TO PHQ9 QUESTIONS 1 & 2: 2
2. FEELING DOWN, DEPRESSED OR HOPELESS: 1
SUM OF ALL RESPONSES TO PHQ QUESTIONS 1-9: 2
1. LITTLE INTEREST OR PLEASURE IN DOING THINGS: 1

## 2024-01-02 ASSESSMENT — LIFESTYLE VARIABLES
HOW MANY STANDARD DRINKS CONTAINING ALCOHOL DO YOU HAVE ON A TYPICAL DAY: 1 OR 2
HOW OFTEN DURING THE LAST YEAR HAVE YOU NEEDED AN ALCOHOLIC DRINK FIRST THING IN THE MORNING TO GET YOURSELF GOING AFTER A NIGHT OF HEAVY DRINKING: 0
HOW OFTEN DURING THE LAST YEAR HAVE YOU FAILED TO DO WHAT WAS NORMALLY EXPECTED FROM YOU BECAUSE OF DRINKING: 0
HOW OFTEN DURING THE LAST YEAR HAVE YOU HAD A FEELING OF GUILT OR REMORSE AFTER DRINKING: 0
HAS A RELATIVE, FRIEND, DOCTOR, OR ANOTHER HEALTH PROFESSIONAL EXPRESSED CONCERN ABOUT YOUR DRINKING OR SUGGESTED YOU CUT DOWN: 0
HOW OFTEN DURING THE LAST YEAR HAVE YOU FOUND THAT YOU WERE NOT ABLE TO STOP DRINKING ONCE YOU HAD STARTED: 0
HAVE YOU OR SOMEONE ELSE BEEN INJURED AS A RESULT OF YOUR DRINKING: 0
HOW OFTEN DO YOU HAVE A DRINK CONTAINING ALCOHOL: 2-3 TIMES A WEEK

## 2024-01-02 NOTE — PROGRESS NOTES
\"Have you been to the ER, urgent care clinic since your last visit?  Hospitalized since your last visit?\"    NO    “Have you seen or consulted any other health care providers outside of [facility] since your last visit?”    YES - When: approximately 3  weeks ago.  Where and Why: dermatology.           
ORTHOPEDIC SURGERY      foot bunion removal, mortons neuroma; [facility]    OTHER SURGICAL HISTORY      MOHS, [facility] (Dr. Andujar)       Social History     Tobacco Use   Smoking Status Every Day    Current packs/day: 0.00    Average packs/day: 1 pack/day for 30.0 years (30.0 ttl pk-yrs)    Types: Cigarettes    Start date: 10/3/1986    Last attempt to quit: 10/3/2016    Years since quittin.2   Smokeless Tobacco Current       Social History     Socioeconomic History    Marital status:    Tobacco Use    Smoking status: Every Day     Current packs/day: 0.00     Average packs/day: 1 pack/day for 30.0 years (30.0 ttl pk-yrs)     Types: Cigarettes     Start date: 10/3/1986     Last attempt to quit: 10/3/2016     Years since quittin.2    Smokeless tobacco: Current   Vaping Use    Vaping Use: Never used   Substance and Sexual Activity    Alcohol use: Yes     Alcohol/week: 2.0 standard drinks of alcohol     Types: 2 Glasses of wine per week    Drug use: No    Sexual activity: Not Currently     Social Determinants of Health     Financial Resource Strain: Low Risk  (2023)    Overall Financial Resource Strain (CARDIA)     Difficulty of Paying Living Expenses: Not hard at all   Transportation Needs: No Transportation Needs (2023)    PRAPARE - Transportation     Lack of Transportation (Medical): No     Lack of Transportation (Non-Medical): No   Physical Activity: Insufficiently Active (2023)    Exercise Vital Sign     Days of Exercise per Week: 1 day     Minutes of Exercise per Session: 10 min   Housing Stability: Unknown (2023)    Housing Stability Vital Sign     Unable to Pay for Housing in the Last Year: No     Unstable Housing in the Last Year: No       Family History   Problem Relation Age of Onset    Hypertension Daughter     Breast Cancer Sister         dx age 50    Hypertension Father     Heart Disease Father     Stroke Father     Hypertension Mother     Heart Attack Mother         65

## 2024-01-09 ENCOUNTER — CLINICAL DOCUMENTATION (OUTPATIENT)
Age: 74
End: 2024-01-09

## 2024-01-14 DIAGNOSIS — R47.02 DYSPHASIA: Primary | ICD-10-CM

## 2024-01-14 DIAGNOSIS — R07.89 CHEST PRESSURE: ICD-10-CM

## 2024-01-25 ENCOUNTER — HOSPITAL ENCOUNTER (OUTPATIENT)
Facility: HOSPITAL | Age: 74
Discharge: HOME | End: 2024-01-25
Payer: MEDICARE

## 2024-01-25 DIAGNOSIS — R07.89 CHEST PRESSURE: ICD-10-CM

## 2024-01-25 DIAGNOSIS — R47.02 DYSPHASIA: ICD-10-CM

## 2024-01-25 PROCEDURE — 74221 X-RAY XM ESOPHAGUS 2CNTRST: CPT

## 2024-02-15 NOTE — TELEPHONE ENCOUNTER
Patient requesting refill on     Requested Prescriptions     Pending Prescriptions Disp Refills    mirtazapine (REMERON) 7.5 MG tablet [Pharmacy Med Name: MIRTAZAPINE 7.5 MG TABLET] 60 tablet 0     Sig: TAKE 1 TO 2 TABLETS BY MOUTH NIGHTLY        Last OV 1/2/2024

## 2024-02-16 RX ORDER — MIRTAZAPINE 7.5 MG/1
7.5-15 TABLET, FILM COATED ORAL NIGHTLY
Qty: 90 TABLET | Refills: 0 | Status: SHIPPED | OUTPATIENT
Start: 2024-02-16

## 2024-02-22 ENCOUNTER — TELEPHONE (OUTPATIENT)
Age: 74
End: 2024-02-22

## 2024-02-22 ENCOUNTER — HOSPITAL ENCOUNTER (EMERGENCY)
Facility: HOSPITAL | Age: 74
Discharge: HOME | End: 2024-02-22
Attending: EMERGENCY MEDICINE
Payer: MEDICARE

## 2024-02-22 ENCOUNTER — APPOINTMENT (OUTPATIENT)
Facility: HOSPITAL | Age: 74
End: 2024-02-22
Payer: MEDICARE

## 2024-02-22 VITALS
OXYGEN SATURATION: 96 % | RESPIRATION RATE: 13 BRPM | DIASTOLIC BLOOD PRESSURE: 73 MMHG | HEART RATE: 80 BPM | BODY MASS INDEX: 20.68 KG/M2 | SYSTOLIC BLOOD PRESSURE: 136 MMHG | TEMPERATURE: 97.9 F | HEIGHT: 65 IN

## 2024-02-22 DIAGNOSIS — R07.89 ATYPICAL CHEST PAIN: Primary | ICD-10-CM

## 2024-02-22 LAB
ANION GAP SERPL CALC-SCNC: 8 MMOL/L (ref 5–15)
APPEARANCE UR: CLEAR
BACTERIA URNS QL MICRO: NEGATIVE /HPF
BASOPHILS # BLD: 0.1 K/UL (ref 0–0.1)
BASOPHILS NFR BLD: 1 % (ref 0–1)
BILIRUB UR QL: NEGATIVE
BUN SERPL-MCNC: 13 MG/DL (ref 6–20)
BUN/CREAT SERPL: 20 (ref 12–20)
CALCIUM SERPL-MCNC: 9.3 MG/DL (ref 8.5–10.1)
CHLORIDE SERPL-SCNC: 102 MMOL/L (ref 97–108)
CO2 SERPL-SCNC: 33 MMOL/L (ref 21–32)
COLOR UR: NORMAL
CREAT SERPL-MCNC: 0.65 MG/DL (ref 0.55–1.02)
DIFFERENTIAL METHOD BLD: ABNORMAL
EOSINOPHIL # BLD: 0.1 K/UL (ref 0–0.4)
EOSINOPHIL NFR BLD: 1 % (ref 0–7)
EPITH CASTS URNS QL MICRO: NORMAL /LPF
ERYTHROCYTE [DISTWIDTH] IN BLOOD BY AUTOMATED COUNT: 12.2 % (ref 11.5–14.5)
GLUCOSE SERPL-MCNC: 122 MG/DL (ref 65–100)
GLUCOSE UR STRIP.AUTO-MCNC: NEGATIVE MG/DL
HCT VFR BLD AUTO: 41.5 % (ref 35–47)
HGB BLD-MCNC: 14 G/DL (ref 11.5–16)
HGB UR QL STRIP: NEGATIVE
IMM GRANULOCYTES # BLD AUTO: 0 K/UL (ref 0–0.04)
IMM GRANULOCYTES NFR BLD AUTO: 0 % (ref 0–0.5)
KETONES UR QL STRIP.AUTO: NEGATIVE MG/DL
LEUKOCYTE ESTERASE UR QL STRIP.AUTO: NEGATIVE
LYMPHOCYTES # BLD: 4.2 K/UL (ref 0.8–3.5)
LYMPHOCYTES NFR BLD: 27 % (ref 12–49)
MCH RBC QN AUTO: 29.8 PG (ref 26–34)
MCHC RBC AUTO-ENTMCNC: 33.7 G/DL (ref 30–36.5)
MCV RBC AUTO: 88.3 FL (ref 80–99)
MONOCYTES # BLD: 1.1 K/UL (ref 0–1)
MONOCYTES NFR BLD: 7 % (ref 5–13)
NEUTS SEG # BLD: 10.1 K/UL (ref 1.8–8)
NEUTS SEG NFR BLD: 64 % (ref 32–75)
NITRITE UR QL STRIP.AUTO: NEGATIVE
NRBC # BLD: 0 K/UL (ref 0–0.01)
NRBC BLD-RTO: 0 PER 100 WBC
NT PRO BNP: 34 PG/ML (ref 0–125)
PH UR STRIP: 7 (ref 5–8)
PLATELET # BLD AUTO: 270 K/UL (ref 150–400)
PMV BLD AUTO: 9 FL (ref 8.9–12.9)
POTASSIUM SERPL-SCNC: 3.3 MMOL/L (ref 3.5–5.1)
PROT UR STRIP-MCNC: NEGATIVE MG/DL
RBC # BLD AUTO: 4.7 M/UL (ref 3.8–5.2)
RBC #/AREA URNS HPF: NORMAL /HPF (ref 0–5)
SODIUM SERPL-SCNC: 143 MMOL/L (ref 136–145)
SP GR UR REFRACTOMETRY: <1.005 (ref 1–1.03)
TROPONIN I SERPL HS-MCNC: 6 NG/L (ref 0–51)
TROPONIN I SERPL HS-MCNC: 9 NG/L (ref 0–51)
URINE CULTURE IF INDICATED: NORMAL
UROBILINOGEN UR QL STRIP.AUTO: 0.2 EU/DL (ref 0.2–1)
WBC # BLD AUTO: 15.7 K/UL (ref 3.6–11)
WBC URNS QL MICRO: NORMAL /HPF (ref 0–4)

## 2024-02-22 PROCEDURE — 84484 ASSAY OF TROPONIN QUANT: CPT

## 2024-02-22 PROCEDURE — 36415 COLL VENOUS BLD VENIPUNCTURE: CPT

## 2024-02-22 PROCEDURE — 83880 ASSAY OF NATRIURETIC PEPTIDE: CPT

## 2024-02-22 PROCEDURE — 71045 X-RAY EXAM CHEST 1 VIEW: CPT

## 2024-02-22 PROCEDURE — 93005 ELECTROCARDIOGRAM TRACING: CPT | Performed by: EMERGENCY MEDICINE

## 2024-02-22 PROCEDURE — 85025 COMPLETE CBC W/AUTO DIFF WBC: CPT

## 2024-02-22 PROCEDURE — 81001 URINALYSIS AUTO W/SCOPE: CPT

## 2024-02-22 PROCEDURE — 71275 CT ANGIOGRAPHY CHEST: CPT

## 2024-02-22 PROCEDURE — 6360000004 HC RX CONTRAST MEDICATION: Performed by: EMERGENCY MEDICINE

## 2024-02-22 PROCEDURE — 80048 BASIC METABOLIC PNL TOTAL CA: CPT

## 2024-02-22 PROCEDURE — 99285 EMERGENCY DEPT VISIT HI MDM: CPT

## 2024-02-22 RX ORDER — ASPIRIN 81 MG/1
81 TABLET ORAL DAILY
Qty: 30 TABLET | Refills: 0 | Status: SHIPPED | OUTPATIENT
Start: 2024-02-22

## 2024-02-22 RX ADMIN — IOPAMIDOL 100 ML: 755 INJECTION, SOLUTION INTRAVENOUS at 18:52

## 2024-02-22 ASSESSMENT — ENCOUNTER SYMPTOMS
SHORTNESS OF BREATH: 1
DIARRHEA: 0
ABDOMINAL PAIN: 0
SORE THROAT: 0
COUGH: 0
NAUSEA: 0
VOMITING: 0
EYE REDNESS: 0

## 2024-02-22 ASSESSMENT — LIFESTYLE VARIABLES
HOW OFTEN DO YOU HAVE A DRINK CONTAINING ALCOHOL: NEVER
HOW MANY STANDARD DRINKS CONTAINING ALCOHOL DO YOU HAVE ON A TYPICAL DAY: PATIENT DOES NOT DRINK

## 2024-02-22 ASSESSMENT — PAIN - FUNCTIONAL ASSESSMENT: PAIN_FUNCTIONAL_ASSESSMENT: NONE - DENIES PAIN

## 2024-02-22 NOTE — ED TRIAGE NOTES
Pt reports chest pressure that begaan 2 hours ago after yard work. C/o high blood pressure readings at home. States the pressure has resolved at time of triage. Does see cardio and has stress test soon.

## 2024-02-22 NOTE — ED PROVIDER NOTES
EMERGENCY DEPARTMENT HISTORY AND PHYSICAL EXAM      Date: 2/22/2024  Patient Name: Hanane Eastman    History of Presenting Illness     Chief Complaint   Patient presents with    Chest Pain       History Provided By: Patient    HPI: Hanane Eastman, 73 y.o. female with PMHx significant for hypertension, hyperlipidemia presents via private vehicle to the ED with cc of chest pain.  Symptoms began around 2:30 after she was doing some yard work outside.  Pain was midline and substernal.  Nonradiating.  Does report some mild shortness of breath.  When pain persisted she decided come to the ER.  She denies any history of CAD.  She reports over the past 3 months she has had similar brief episodes.  She had a negative chest pain workup here in the ED on December 1, 2023.  This prompted a visit with a cardiologist Dr. Wong ARIZA.  She states she has a stress test scheduled for next Thursday.  She denies any cough.  No fevers or chills.  No diaphoresis.  No abdominal pain.  No nausea or vomiting.  No prior history of CAD.        PMHx: Significant for hypertension, hyperlipidemia, anxiety  PSHx: Significant for Mohs surgery.  Tubal ligation.  Social Hx: Pack-a-day smoker for 30 years.    There are no other complaints, changes, or physical findings at this time.    PCP: Sarah Arguello, JACKSON - NP    No current facility-administered medications on file prior to encounter.     Current Outpatient Medications on File Prior to Encounter   Medication Sig Dispense Refill    mirtazapine (REMERON) 7.5 MG tablet TAKE 1 TO 2 TABLETS BY MOUTH NIGHTLY 90 tablet 0    Omeprazole Magnesium (PRILOSEC OTC PO) Take by mouth      Probiotic Product (PROBIOTIC DAILY PO) Take by mouth      escitalopram (LEXAPRO) 10 MG tablet Take 1 tablet by mouth daily 90 tablet 2    rosuvastatin (CRESTOR) 20 MG tablet Take 1 tablet by mouth nightly 90 tablet 1    losartan (COZAAR) 50 MG tablet TAKE 1 TABLET BY MOUTH EVERY DAY 90 tablet 0    amLODIPine (NORVASC)

## 2024-02-22 NOTE — TELEPHONE ENCOUNTER
Patients  called and stated patient had been working in yard and she experienced some chest discomfort and elevated bp 155/90 advised to take to the emergency room since she was experiencing the chest pain

## 2024-02-23 NOTE — DISCHARGE INSTR - COC
Admission H&P: {CHP DME Changes in HandP:[id number]}    PHYSICIAN SIGNATURE:  {Esignature:[id number]}

## 2024-02-24 LAB
EKG ATRIAL RATE: 89 BPM
EKG DIAGNOSIS: NORMAL
EKG P AXIS: 62 DEGREES
EKG P-R INTERVAL: 174 MS
EKG Q-T INTERVAL: 376 MS
EKG QRS DURATION: 84 MS
EKG QTC CALCULATION (BAZETT): 457 MS
EKG R AXIS: -59 DEGREES
EKG T AXIS: 39 DEGREES
EKG VENTRICULAR RATE: 89 BPM

## 2024-03-01 DIAGNOSIS — I10 ESSENTIAL (PRIMARY) HYPERTENSION: ICD-10-CM

## 2024-03-01 RX ORDER — AMLODIPINE BESYLATE 5 MG/1
TABLET ORAL
Qty: 90 TABLET | Refills: 0 | Status: SHIPPED | OUTPATIENT
Start: 2024-03-01

## 2024-03-01 RX ORDER — LOSARTAN POTASSIUM 50 MG/1
TABLET ORAL
Qty: 90 TABLET | Refills: 0 | Status: SHIPPED | OUTPATIENT
Start: 2024-03-01

## 2024-03-26 ENCOUNTER — TELEMEDICINE (OUTPATIENT)
Age: 74
End: 2024-03-26
Payer: MEDICARE

## 2024-03-26 ENCOUNTER — CLINICAL DOCUMENTATION (OUTPATIENT)
Age: 74
End: 2024-03-26

## 2024-03-26 DIAGNOSIS — R47.02 DYSPHASIA: Primary | ICD-10-CM

## 2024-03-26 DIAGNOSIS — G47.9 DIFFICULTY SLEEPING: ICD-10-CM

## 2024-03-26 PROCEDURE — 1123F ACP DISCUSS/DSCN MKR DOCD: CPT | Performed by: NURSE PRACTITIONER

## 2024-03-26 PROCEDURE — G8399 PT W/DXA RESULTS DOCUMENT: HCPCS | Performed by: NURSE PRACTITIONER

## 2024-03-26 PROCEDURE — 1090F PRES/ABSN URINE INCON ASSESS: CPT | Performed by: NURSE PRACTITIONER

## 2024-03-26 PROCEDURE — 3017F COLORECTAL CA SCREEN DOC REV: CPT | Performed by: NURSE PRACTITIONER

## 2024-03-26 PROCEDURE — G8427 DOCREV CUR MEDS BY ELIG CLIN: HCPCS | Performed by: NURSE PRACTITIONER

## 2024-03-26 PROCEDURE — G2025 DIS SITE TELE SVCS RHC/FQHC: HCPCS | Performed by: NURSE PRACTITIONER

## 2024-03-26 RX ORDER — PANTOPRAZOLE SODIUM 40 MG/1
40 TABLET, DELAYED RELEASE ORAL
Qty: 30 TABLET | Refills: 3 | Status: SHIPPED | OUTPATIENT
Start: 2024-03-26

## 2024-03-26 RX ORDER — TRAZODONE HYDROCHLORIDE 50 MG/1
50 TABLET ORAL NIGHTLY
Qty: 90 TABLET | Refills: 1 | Status: SHIPPED | OUTPATIENT
Start: 2024-03-26

## 2024-03-26 ASSESSMENT — PATIENT HEALTH QUESTIONNAIRE - PHQ9
SUM OF ALL RESPONSES TO PHQ QUESTIONS 1-9: 2
SUM OF ALL RESPONSES TO PHQ QUESTIONS 1-9: 2
SUM OF ALL RESPONSES TO PHQ9 QUESTIONS 1 & 2: 2
2. FEELING DOWN, DEPRESSED OR HOPELESS: SEVERAL DAYS
SUM OF ALL RESPONSES TO PHQ QUESTIONS 1-9: 2
1. LITTLE INTEREST OR PLEASURE IN DOING THINGS: SEVERAL DAYS
SUM OF ALL RESPONSES TO PHQ QUESTIONS 1-9: 2

## 2024-03-26 ASSESSMENT — ENCOUNTER SYMPTOMS
CHEST TIGHTNESS: 0
DIARRHEA: 0
NAUSEA: 0
BLOOD IN STOOL: 0
EYE DISCHARGE: 0
CONSTIPATION: 0

## 2024-03-26 NOTE — PROGRESS NOTES
\"Have you been to the ER, urgent care clinic since your last visit?  Hospitalized since your last visit?\"    YES - When: approximately 1 months ago.  Where and Why: ER .    “Have you seen or consulted any other health care providers outside of [facility] since your last visit?”    YES - When: approximately 6 days ago.  Where and Why: ortho.            Click Here for Release of Records Request  
she had an xray showing arthritis. She tells me the diclofenac has improved her chest pain and her dysphagia. She would like to know if she can complete an EGD to determine what is causing her dysphagia. She denies weight loss or changes in appetite. Will refer to gastroenterology for recommendations.     Of note, she needs a refill on her trazodone. She stopped the mirtazapine. Trazodone is working better for her.             Review of Systems   Constitutional:  Negative for activity change and appetite change.   HENT:  Negative for congestion.    Eyes:  Negative for discharge.   Respiratory:  Negative for chest tightness.    Cardiovascular:  Positive for chest pain.   Gastrointestinal:  Negative for blood in stool, constipation, diarrhea and nausea.        +trouble swallowing   Endocrine: Negative for cold intolerance.   Genitourinary:  Negative for difficulty urinating.   Neurological:  Negative for dizziness.          Objective   Patient-Reported Vitals  No data recorded     Physical Exam  Constitutional:       General: She is not in acute distress.     Appearance: She is not ill-appearing or toxic-appearing.   HENT:      Head: Normocephalic.      Nose: Nose normal.   Eyes:      Pupils: Pupils are equal, round, and reactive to light.   Pulmonary:      Effort: Pulmonary effort is normal.   Neurological:      Mental Status: She is alert and oriented to person, place, and time.              On this date 3/26/2024 I have spent 20 minutes reviewing previous notes, test results and face to face (virtual) with the patient discussing the diagnosis and importance of compliance with the treatment plan as well as documenting on the day of the visit.    --JACKSON Mccauley - ROBERTO

## 2024-04-10 ENCOUNTER — TELEPHONE (OUTPATIENT)
Age: 74
End: 2024-04-10

## 2024-04-10 NOTE — TELEPHONE ENCOUNTER
Pt called saying her BP has been elevated and mychart message has been sent to Sarah as well. When I asked her if she was having chest pain she said it was a little pressure and her right arm was also hurting so I encouraged her to go to ER with those type of symptoms especially with that high BP but she said she has had a stress test done and just would like to see Sarah but I did tell her if her symptoms get worse or the chest pressure increased before she is able to get in with Sarah she really needs to go to ER

## 2024-04-15 ENCOUNTER — OFFICE VISIT (OUTPATIENT)
Age: 74
End: 2024-04-15
Payer: MEDICARE

## 2024-04-15 VITALS
OXYGEN SATURATION: 95 % | SYSTOLIC BLOOD PRESSURE: 157 MMHG | WEIGHT: 123.38 LBS | BODY MASS INDEX: 20.55 KG/M2 | TEMPERATURE: 97.3 F | HEIGHT: 65 IN | RESPIRATION RATE: 18 BRPM | HEART RATE: 86 BPM | DIASTOLIC BLOOD PRESSURE: 77 MMHG

## 2024-04-15 DIAGNOSIS — I10 ESSENTIAL (PRIMARY) HYPERTENSION: Primary | ICD-10-CM

## 2024-04-15 PROCEDURE — 36415 COLL VENOUS BLD VENIPUNCTURE: CPT | Performed by: NURSE PRACTITIONER

## 2024-04-15 PROCEDURE — 4004F PT TOBACCO SCREEN RCVD TLK: CPT | Performed by: NURSE PRACTITIONER

## 2024-04-15 PROCEDURE — 3078F DIAST BP <80 MM HG: CPT | Performed by: NURSE PRACTITIONER

## 2024-04-15 PROCEDURE — 1090F PRES/ABSN URINE INCON ASSESS: CPT | Performed by: NURSE PRACTITIONER

## 2024-04-15 PROCEDURE — 3017F COLORECTAL CA SCREEN DOC REV: CPT | Performed by: NURSE PRACTITIONER

## 2024-04-15 PROCEDURE — 99213 OFFICE O/P EST LOW 20 MIN: CPT | Performed by: NURSE PRACTITIONER

## 2024-04-15 PROCEDURE — G8399 PT W/DXA RESULTS DOCUMENT: HCPCS | Performed by: NURSE PRACTITIONER

## 2024-04-15 PROCEDURE — 1123F ACP DISCUSS/DSCN MKR DOCD: CPT | Performed by: NURSE PRACTITIONER

## 2024-04-15 PROCEDURE — G8427 DOCREV CUR MEDS BY ELIG CLIN: HCPCS | Performed by: NURSE PRACTITIONER

## 2024-04-15 PROCEDURE — 3077F SYST BP >= 140 MM HG: CPT | Performed by: NURSE PRACTITIONER

## 2024-04-15 PROCEDURE — G8420 CALC BMI NORM PARAMETERS: HCPCS | Performed by: NURSE PRACTITIONER

## 2024-04-15 RX ORDER — CLONIDINE HYDROCHLORIDE 0.1 MG/1
0.1 TABLET ORAL DAILY
Qty: 30 TABLET | Refills: 3 | Status: SHIPPED | OUTPATIENT
Start: 2024-04-15

## 2024-04-15 ASSESSMENT — ENCOUNTER SYMPTOMS
ABDOMINAL DISTENTION: 0
CHEST TIGHTNESS: 0
EYE DISCHARGE: 0

## 2024-04-15 ASSESSMENT — PATIENT HEALTH QUESTIONNAIRE - PHQ9
1. LITTLE INTEREST OR PLEASURE IN DOING THINGS: NOT AT ALL
SUM OF ALL RESPONSES TO PHQ QUESTIONS 1-9: 1
SUM OF ALL RESPONSES TO PHQ9 QUESTIONS 1 & 2: 1
SUM OF ALL RESPONSES TO PHQ QUESTIONS 1-9: 1
2. FEELING DOWN, DEPRESSED OR HOPELESS: SEVERAL DAYS

## 2024-04-15 NOTE — PROGRESS NOTES
Labs drawn in left arm per almazan's orders.  Patient tolerated well.  
\"Have you been to the ER, urgent care clinic since your last visit?  Hospitalized since your last visit?\"    NO    “Have you seen or consulted any other health care providers outside of [facility] since your last visit?”    YES - When: approximately 1 months ago.  Where and Why: ortho.            Click Here for Release of Records Request  
Other (See Comments)     Elevated CK       Past Surgical History:   Procedure Laterality Date    CHOLECYSTECTOMY      COLONOSCOPY  , 14    colon polyps seen on both studies, repeat every 5 years (Dr. Curry)    EYE SURGERY      GYN      tubal ligation    ORTHOPEDIC SURGERY      foot bunion removal, mortons neuroma; [facility]    OTHER SURGICAL HISTORY      MOHS, [facility] (Dr. Andujar)       Social History     Tobacco Use   Smoking Status Every Day    Current packs/day: 0.00    Average packs/day: 1 pack/day for 30.0 years (30.0 ttl pk-yrs)    Types: Cigarettes    Start date: 10/3/1986    Last attempt to quit: 10/3/2016    Years since quittin.5   Smokeless Tobacco Current       Social History     Socioeconomic History    Marital status:      Spouse name: None    Number of children: None    Years of education: None    Highest education level: None   Tobacco Use    Smoking status: Every Day     Current packs/day: 0.00     Average packs/day: 1 pack/day for 30.0 years (30.0 ttl pk-yrs)     Types: Cigarettes     Start date: 10/3/1986     Last attempt to quit: 10/3/2016     Years since quittin.5    Smokeless tobacco: Current   Vaping Use    Vaping Use: Never used   Substance and Sexual Activity    Alcohol use: Yes     Alcohol/week: 2.0 standard drinks of alcohol     Types: 2 Glasses of wine per week    Drug use: No    Sexual activity: Not Currently     Partners: Male     Social Determinants of Health     Financial Resource Strain: Low Risk  (2023)    Overall Financial Resource Strain (CARDIA)     Difficulty of Paying Living Expenses: Not hard at all   Transportation Needs: Unknown (2023)    PRAPARE - Transportation     Lack of Transportation (Non-Medical): No   Physical Activity: Insufficiently Active (2024)    Exercise Vital Sign     Days of Exercise per Week: 1 day     Minutes of Exercise per Session: 30 min   Housing Stability: Unknown (2023)    Housing Stability Vital Sign

## 2024-04-16 LAB
ALBUMIN SERPL-MCNC: 4.2 G/DL (ref 3.5–5)
ALBUMIN/GLOB SERPL: 1.3 (ref 1.1–2.2)
ALP SERPL-CCNC: 70 U/L (ref 45–117)
ALT SERPL-CCNC: 27 U/L (ref 12–78)
ANION GAP SERPL CALC-SCNC: 5 MMOL/L (ref 5–15)
AST SERPL-CCNC: 24 U/L (ref 15–37)
BASOPHILS # BLD: 0.1 K/UL (ref 0–0.1)
BASOPHILS NFR BLD: 1 % (ref 0–1)
BILIRUB SERPL-MCNC: 0.4 MG/DL (ref 0.2–1)
BUN SERPL-MCNC: 12 MG/DL (ref 6–20)
BUN/CREAT SERPL: 21 (ref 12–20)
CALCIUM SERPL-MCNC: 9.9 MG/DL (ref 8.5–10.1)
CHLORIDE SERPL-SCNC: 103 MMOL/L (ref 97–108)
CO2 SERPL-SCNC: 31 MMOL/L (ref 21–32)
CREAT SERPL-MCNC: 0.56 MG/DL (ref 0.55–1.02)
DIFFERENTIAL METHOD BLD: ABNORMAL
EOSINOPHIL # BLD: 0.2 K/UL (ref 0–0.4)
EOSINOPHIL NFR BLD: 2 % (ref 0–7)
ERYTHROCYTE [DISTWIDTH] IN BLOOD BY AUTOMATED COUNT: 13.7 % (ref 11.5–14.5)
GLOBULIN SER CALC-MCNC: 3.2 G/DL (ref 2–4)
GLUCOSE SERPL-MCNC: 103 MG/DL (ref 65–100)
HCT VFR BLD AUTO: 42.9 % (ref 35–47)
HGB BLD-MCNC: 14.2 G/DL (ref 11.5–16)
IMM GRANULOCYTES # BLD AUTO: 0 K/UL (ref 0–0.04)
IMM GRANULOCYTES NFR BLD AUTO: 0 % (ref 0–0.5)
LYMPHOCYTES # BLD: 3.8 K/UL (ref 0.8–3.5)
LYMPHOCYTES NFR BLD: 31 % (ref 12–49)
MCH RBC QN AUTO: 31.1 PG (ref 26–34)
MCHC RBC AUTO-ENTMCNC: 33.1 G/DL (ref 30–36.5)
MCV RBC AUTO: 94.1 FL (ref 80–99)
MONOCYTES # BLD: 1 K/UL (ref 0–1)
MONOCYTES NFR BLD: 8 % (ref 5–13)
NEUTS SEG # BLD: 7.2 K/UL (ref 1.8–8)
NEUTS SEG NFR BLD: 58 % (ref 32–75)
NRBC # BLD: 0 K/UL (ref 0–0.01)
NRBC BLD-RTO: 0 PER 100 WBC
PLATELET # BLD AUTO: 284 K/UL (ref 150–400)
PMV BLD AUTO: 10.2 FL (ref 8.9–12.9)
POTASSIUM SERPL-SCNC: 4.2 MMOL/L (ref 3.5–5.1)
PROT SERPL-MCNC: 7.4 G/DL (ref 6.4–8.2)
RBC # BLD AUTO: 4.56 M/UL (ref 3.8–5.2)
SODIUM SERPL-SCNC: 139 MMOL/L (ref 136–145)
TSH SERPL DL<=0.05 MIU/L-ACNC: 0.68 UIU/ML (ref 0.36–3.74)
WBC # BLD AUTO: 12.3 K/UL (ref 3.6–11)

## 2024-04-25 ENCOUNTER — TELEPHONE (OUTPATIENT)
Age: 74
End: 2024-04-25

## 2024-04-25 NOTE — TELEPHONE ENCOUNTER
Patient states her blood pressure has been running 160s/80s  for the past week.  NP advised patient could take clonidine BID or TID but try the BID first.  Patient advised to call back if she does not noticed a change in BP.

## 2024-05-02 RX ORDER — SUCRALFATE 1 G/1
1 TABLET ORAL 4 TIMES DAILY
Qty: 120 TABLET | Refills: 0 | Status: SHIPPED | OUTPATIENT
Start: 2024-05-02

## 2024-05-16 ENCOUNTER — ANESTHESIA EVENT (OUTPATIENT)
Facility: HOSPITAL | Age: 74
End: 2024-05-16
Payer: MEDICARE

## 2024-05-16 ENCOUNTER — HOSPITAL ENCOUNTER (OUTPATIENT)
Facility: HOSPITAL | Age: 74
Setting detail: OUTPATIENT SURGERY
Discharge: HOME | End: 2024-05-16
Attending: STUDENT IN AN ORGANIZED HEALTH CARE EDUCATION/TRAINING PROGRAM | Admitting: STUDENT IN AN ORGANIZED HEALTH CARE EDUCATION/TRAINING PROGRAM
Payer: MEDICARE

## 2024-05-16 ENCOUNTER — ANESTHESIA (OUTPATIENT)
Facility: HOSPITAL | Age: 74
End: 2024-05-16
Payer: MEDICARE

## 2024-05-16 VITALS
TEMPERATURE: 97.2 F | WEIGHT: 120.9 LBS | SYSTOLIC BLOOD PRESSURE: 143 MMHG | HEIGHT: 66 IN | HEART RATE: 81 BPM | RESPIRATION RATE: 14 BRPM | DIASTOLIC BLOOD PRESSURE: 64 MMHG | BODY MASS INDEX: 19.43 KG/M2 | OXYGEN SATURATION: 96 %

## 2024-05-16 PROCEDURE — 2500000003 HC RX 250 WO HCPCS: Performed by: NURSE ANESTHETIST, CERTIFIED REGISTERED

## 2024-05-16 PROCEDURE — 6360000002 HC RX W HCPCS: Performed by: NURSE ANESTHETIST, CERTIFIED REGISTERED

## 2024-05-16 PROCEDURE — 7100000011 HC PHASE II RECOVERY - ADDTL 15 MIN: Performed by: STUDENT IN AN ORGANIZED HEALTH CARE EDUCATION/TRAINING PROGRAM

## 2024-05-16 PROCEDURE — 3600007512: Performed by: STUDENT IN AN ORGANIZED HEALTH CARE EDUCATION/TRAINING PROGRAM

## 2024-05-16 PROCEDURE — 2709999900 HC NON-CHARGEABLE SUPPLY: Performed by: STUDENT IN AN ORGANIZED HEALTH CARE EDUCATION/TRAINING PROGRAM

## 2024-05-16 PROCEDURE — 2580000003 HC RX 258: Performed by: STUDENT IN AN ORGANIZED HEALTH CARE EDUCATION/TRAINING PROGRAM

## 2024-05-16 PROCEDURE — 3600007502: Performed by: STUDENT IN AN ORGANIZED HEALTH CARE EDUCATION/TRAINING PROGRAM

## 2024-05-16 PROCEDURE — 88305 TISSUE EXAM BY PATHOLOGIST: CPT

## 2024-05-16 PROCEDURE — 3700000000 HC ANESTHESIA ATTENDED CARE: Performed by: STUDENT IN AN ORGANIZED HEALTH CARE EDUCATION/TRAINING PROGRAM

## 2024-05-16 PROCEDURE — 7100000010 HC PHASE II RECOVERY - FIRST 15 MIN: Performed by: STUDENT IN AN ORGANIZED HEALTH CARE EDUCATION/TRAINING PROGRAM

## 2024-05-16 PROCEDURE — 3700000001 HC ADD 15 MINUTES (ANESTHESIA): Performed by: STUDENT IN AN ORGANIZED HEALTH CARE EDUCATION/TRAINING PROGRAM

## 2024-05-16 RX ORDER — LIDOCAINE HYDROCHLORIDE 20 MG/ML
INJECTION, SOLUTION EPIDURAL; INFILTRATION; INTRACAUDAL; PERINEURAL PRN
Status: DISCONTINUED | OUTPATIENT
Start: 2024-05-16 | End: 2024-05-16 | Stop reason: SDUPTHER

## 2024-05-16 RX ORDER — SODIUM CHLORIDE 0.9 % (FLUSH) 0.9 %
5-40 SYRINGE (ML) INJECTION PRN
Status: DISCONTINUED | OUTPATIENT
Start: 2024-05-16 | End: 2024-05-16 | Stop reason: HOSPADM

## 2024-05-16 RX ORDER — SODIUM CHLORIDE 9 MG/ML
INJECTION, SOLUTION INTRAVENOUS CONTINUOUS
Status: DISCONTINUED | OUTPATIENT
Start: 2024-05-16 | End: 2024-05-16 | Stop reason: HOSPADM

## 2024-05-16 RX ORDER — SODIUM CHLORIDE 9 MG/ML
25 INJECTION, SOLUTION INTRAVENOUS PRN
Status: DISCONTINUED | OUTPATIENT
Start: 2024-05-16 | End: 2024-05-16 | Stop reason: HOSPADM

## 2024-05-16 RX ORDER — SODIUM CHLORIDE 0.9 % (FLUSH) 0.9 %
5-40 SYRINGE (ML) INJECTION EVERY 12 HOURS SCHEDULED
Status: DISCONTINUED | OUTPATIENT
Start: 2024-05-16 | End: 2024-05-16 | Stop reason: HOSPADM

## 2024-05-16 RX ADMIN — LIDOCAINE HYDROCHLORIDE 40 MG: 20 INJECTION, SOLUTION EPIDURAL; INFILTRATION; INTRACAUDAL; PERINEURAL at 13:34

## 2024-05-16 RX ADMIN — SODIUM CHLORIDE 25 ML: 9 INJECTION, SOLUTION INTRAVENOUS at 13:30

## 2024-05-16 RX ADMIN — PROPOFOL 50 MG: 10 INJECTION, EMULSION INTRAVENOUS at 13:36

## 2024-05-16 RX ADMIN — PROPOFOL 20 MG: 10 INJECTION, EMULSION INTRAVENOUS at 13:38

## 2024-05-16 RX ADMIN — PROPOFOL 20 MG: 10 INJECTION, EMULSION INTRAVENOUS at 13:41

## 2024-05-16 RX ADMIN — PROPOFOL 50 MG: 10 INJECTION, EMULSION INTRAVENOUS at 13:34

## 2024-05-16 ASSESSMENT — PAIN - FUNCTIONAL ASSESSMENT: PAIN_FUNCTIONAL_ASSESSMENT: NONE - DENIES PAIN

## 2024-05-16 NOTE — PROGRESS NOTES
ARRIVAL INFORMATION:  Verified patient name and date of birth, scheduled procedure, and informed consent.     : Kalpesh, , contact number: [phone]  Physician and staff can share information with the .     Belongings with patient include:  Clothing,None    GI FOCUSED ASSESSMENT:  Neuro: Awake, alert, oriented x4  Respiratory: even and unlabored   GI: soft and non-distended  EKG Rhythm: normal sinus rhythm    Education:Reviewed general discharge instructions and  information.

## 2024-05-16 NOTE — PROGRESS NOTES
Endoscopy Case End Note:    1346:  Procedure scope was pre-cleaned, per protocol, at bedside by ARMANDO Dutton.      1350:  Report received from anesthesia - SOURAV Poon.  See anesthesia flowsheet for intra-procedure vital signs and events.

## 2024-05-16 NOTE — H&P
[facility]  ERIBERTO Andre D.O.  [phone]          Pre-procedure History and Physical       NAME:  Hanane Eastman   :   1950   MRN:   [id number]     CHIEF COMPLAINT/HPI: dysphagia    74 y.o. female here for EGD for eval of dysphagia symptoms.     PMH:  Past Medical History:   Diagnosis Date    Anxiety     Basal cell carcinoma of skin     Benign essential hypertension 10/19/2016    Colon polyps     Elevated CK 3/25/15    mild    GERD (gastroesophageal reflux disease)     Heart murmur 16    echo showed trivial MR and trivial TR    Hemorrhoids     per Dr. Hightower    Hyperlipidemia LDL goal < 100     Hypertension     Oral dysplasia, acquired 2016    Dr. Carlos A Rios    Osteopenia 2013    Pneumonia 3/21/14    Tobacco dependence     quit 3/19/14, restarted 2014       PSH:  Past Surgical History:   Procedure Laterality Date    CATARACT EXTRACTION W/ INTRAOCULAR LENS  IMPLANT, BILATERAL      CHOLECYSTECTOMY      COLONOSCOPY  , 14    colon polyps seen on both studies, repeat every 5 years (Dr. Curry)    GYN      tubal ligation    HIP ARTHROPLASTY Right     ORTHOPEDIC SURGERY      foot bunion removal, mortons neuroma; [facility]    OTHER SURGICAL HISTORY      MOHS, [facility] (Dr. Andujar)       Allergies:  Allergies   Allergen Reactions    Ace Inhibitors Cough    Adhesive Tape     Atorvastatin Other (See Comments)     Elevated CK       Home Medications:  Prior to Admission Medications   Prescriptions Last Dose Informant Patient Reported? Taking?   Omega-3 Fatty Acids (FISH OIL) 1000 MG capsule 5/15/2024  Yes No   Sig: Take 1 capsule by mouth daily   Probiotic Product (PROBIOTIC DAILY PO) 5/15/2024  Yes No   Sig: Take by mouth   amLODIPine (NORVASC) 5 MG tablet 2024  No No   Sig: TAKE 1 TABLET BY MOUTH EVERY DAY   cloNIDine (CATAPRES) 0.1 MG tablet 2024  No No   Sig: Take 1 tablet by mouth daily In the evening for blood pressure   Patient taking

## 2024-05-16 NOTE — DISCHARGE INSTRUCTIONS
[facility]  ERIBERTO Andre D.O.  [phone]           EGD DISCHARGE INSTRUCTIONS      Hanane Eastman  [id number]  1950    DISCOMFORT:  Sore throat- throat lozenges or warm salt water gargle  Redness at IV site- apply warm compress to area; if redness or soreness persist- contact your physician  Gaseous discomfort- walking, belching will help relieve any discomfort  You may not operate a vehicle for 12 hours  You may not engage in an occupation involving machinery or appliances for the  rest of today  You may not drink alcoholic beverages for at least 12 hours  Avoid making any critical decisions for at least 24 hours    DIET:   You may resume your normal diet, but some patients find that heavy or large meals may lead to indigestion or vomiting. I suggest a light meal as first food intake.    ACTIVITY:  You may resume your normal daily activities.  It is recommended that you spend the remainder of the day resting - avoid any strenuous activity.    CALL M.D. IF ANY SIGN OF:   Increasing pain, nausea, vomiting  Abdominal distension (swelling)  Significant bleeding (oral or rectal)  Fever   Pain in chest area  Shortness of breath    Additional Instructions:   Call Dr. Andre if any questions or problems at [phone]  If biopsies were taken, you should receive the biopsy results by phone or mail within 3 weeks, if not, call my office for the results  EGD showed esophageal stenosis & hiatal hernia. Esophagus was dilated.  Resume regular medications   Follow-up at your convenience or in 2-3 months.    ERIBERTO Andre D.O.  [facility]        Patient Education on Sedation / Analgesia Administered for Procedure      For 24 hours after general anesthesia or intravenous analgesia / sedation:  Have someone responsible help you with your care  Limit your activities  Do not drive and operate hazardous machinery  Do not make important personal, legal or

## 2024-05-16 NOTE — ANESTHESIA PRE PROCEDURE
ROS  (+) GERD: poorly controlled          Endo/Other: Negative Endo/Other ROS                    Abdominal:             Vascular: negative vascular ROS.         Other Findings:       Anesthesia Plan      MAC     ASA 2       Induction: intravenous.      Anesthetic plan and risks discussed with patient.      Plan discussed with CRNA.                Polo Casillas MD   5/16/2024

## 2024-05-16 NOTE — PERIOP NOTE
CRE balloon dilatation of the esophagus   18 mm Balloon inflated to 3 ATMs and held for 20 seconds.  19 mm Balloon inflated to 4.5 ATMs and held for 5 seconds.  20 mm Balloon inflated to 6 ATMs and held for 30 seconds.    No subcutaneous crepitus of the chest or cervical region was noted post dilatation.

## 2024-05-16 NOTE — OP NOTE
[facility]  ERIBERTO Andre D.OPuja  [phone]             Esophagogastroduodenoscopy (EGD) Procedure Note    NAME: Hanane Eastman  :  1950  MRN:  [id number]    Indications:  dysphagia      : Jyotsna Andre DO    Referring Provider:  Sarah Arguello, JACKSON - NP    Staff: Circulator: Monet Campbell, RN; Julia Redd RN  Endoscopy Technician: Eneida Stewart    Prosthetic devices, grafts, tissues, transplant, or devices implanted: none    Medicine:  MAC anesthesia Propofol      Procedure Details:  After informed consent was obtained with all risks and benefits of the procedure explained and preprocedure exam completed, the patient was placed in the left lateral decubitus position.  Universal protocol for patient identification was performed and documented in the nursing notes.  Throughout the procedure, the patient's blood pressure was monitored at least every five minutes; pulse, and oxygen saturations were monitored continuously.  All vital signs were documented in the nursing notes.  The endoscope was inserted into the mouth and advanced under direct vision to second portion of the duodenum.  A careful inspection was made as the gastroscope was withdrawn, including a retroflexed view of the proximal stomach; findings and interventions are described below.      Findings:   Esophagus:  - Irregular Z-line noted, biopsied to rule out BE/intestinal metaplasia. GEJ ~36cm from incisors.   - Biopsies taken from the proximal & distal esophagus for the purpose of identifying or ruling out eosinophilic esophagitis.   - Esophageal stenosis/stricture noted at GE Junction. TTS balloon passed through the stricture (18 mm to 20 mm in size), and inflated to 20 mm. The stricture was examined post-dilation and no perforation, mucosal tear or significant bleeding noted.     Stomach:   Hill Grade III GEJ- consistent with small sliding hiatal hernia  Otherwise

## 2024-05-16 NOTE — ANESTHESIA POSTPROCEDURE EVALUATION
Department of Anesthesiology  Postprocedure Note    Patient: Hanane Eastman  MRN: [id number]  YOB: 1950  Date of evaluation: 5/16/2024    Procedure Summary       Date: 05/16/24 Room / Location: [facility] 04 / [facility] ENDOSCOPY    Anesthesia Start: 1332 Anesthesia Stop: 1349    Procedure: ESOPHAGOGASTRODUODENOSCOPY (Upper GI Region) Diagnosis:       Dysphagia, unspecified type      (Dysphagia, unspecified type [R13.10])    Surgeons: Jyotsna Andre DO Responsible Provider: Carlos A Macdonald MD    Anesthesia Type: TIVA ASA Status: 2            Anesthesia Type: TIVA    Abdifatah Phase I: Abdifatah Score: 10    Abdifatah Phase II: Abdifatah Score: 10    Anesthesia Post Evaluation    Patient location during evaluation: PACU  Patient participation: complete - patient participated  Level of consciousness: awake and alert  Airway patency: patent  Nausea & Vomiting: no nausea and no vomiting  Cardiovascular status: hemodynamically stable  Respiratory status: acceptable  Hydration status: stable    No notable events documented.

## 2024-05-16 NOTE — DISCHARGE SUMMARY
[facility]  ERIBERTO Andre D.O.  [phone]           EGD DISCHARGE INSTRUCTIONS      Hanane Eastman  [id number]  1950    DISCOMFORT:  Sore throat- throat lozenges or warm salt water gargle  Redness at IV site- apply warm compress to area; if redness or soreness persist- contact your physician  Gaseous discomfort- walking, belching will help relieve any discomfort  You may not operate a vehicle for 12 hours  You may not engage in an occupation involving machinery or appliances for the  rest of today  You may not drink alcoholic beverages for at least 12 hours  Avoid making any critical decisions for at least 24 hours    DIET:   You may resume your normal diet, but some patients find that heavy or large meals may lead to indigestion or vomiting. I suggest a light meal as first food intake.    ACTIVITY:  You may resume your normal daily activities.  It is recommended that you spend the remainder of the day resting - avoid any strenuous activity.    CALL M.D. IF ANY SIGN OF:   Increasing pain, nausea, vomiting  Abdominal distension (swelling)  Significant bleeding (oral or rectal)  Fever   Pain in chest area  Shortness of breath    Additional Instructions:   Call Dr. Andre if any questions or problems at [phone]  If biopsies were taken, you should receive the biopsy results by phone or mail within 3 weeks, if not, call my office for the results  EGD showed esophageal stenosis & hiatal hernia. Esophagus was dilated.  Resume regular medications   Follow-up at your convenience or in 2-3 months.    ERIBERTO Andre D.O.  [facility]

## 2024-05-17 RX ORDER — BUSPIRONE HYDROCHLORIDE 5 MG/1
5 TABLET ORAL 2 TIMES DAILY
Qty: 60 TABLET | Refills: 0 | Status: SHIPPED | OUTPATIENT
Start: 2024-05-17 | End: 2024-06-16

## 2024-05-23 RX ORDER — BUSPIRONE HYDROCHLORIDE 5 MG/1
5 TABLET ORAL 2 TIMES DAILY
Qty: 60 TABLET | Refills: 0 | OUTPATIENT
Start: 2024-05-23 | End: 2024-06-22

## 2024-05-30 ENCOUNTER — OFFICE VISIT (OUTPATIENT)
Age: 74
End: 2024-05-30

## 2024-05-30 VITALS
RESPIRATION RATE: 18 BRPM | OXYGEN SATURATION: 97 % | HEART RATE: 83 BPM | DIASTOLIC BLOOD PRESSURE: 74 MMHG | WEIGHT: 122 LBS | BODY MASS INDEX: 19.61 KG/M2 | SYSTOLIC BLOOD PRESSURE: 168 MMHG | TEMPERATURE: 96.7 F | HEIGHT: 66 IN

## 2024-05-30 DIAGNOSIS — Z00.00 MEDICARE ANNUAL WELLNESS VISIT, SUBSEQUENT: Primary | ICD-10-CM

## 2024-05-30 DIAGNOSIS — R47.02 DYSPHASIA: ICD-10-CM

## 2024-05-30 DIAGNOSIS — I10 ESSENTIAL (PRIMARY) HYPERTENSION: ICD-10-CM

## 2024-05-30 DIAGNOSIS — F43.0 ANXIETY AS ACUTE REACTION TO EXCEPTIONAL STRESS: ICD-10-CM

## 2024-05-30 DIAGNOSIS — F41.1 ANXIETY AS ACUTE REACTION TO EXCEPTIONAL STRESS: ICD-10-CM

## 2024-05-30 DIAGNOSIS — R09.3 INCREASED SPUTUM PRODUCTION: ICD-10-CM

## 2024-05-30 RX ORDER — AMLODIPINE BESYLATE 5 MG/1
TABLET ORAL
Qty: 90 TABLET | Refills: 0 | Status: SHIPPED | OUTPATIENT
Start: 2024-05-30

## 2024-05-30 RX ORDER — LOSARTAN POTASSIUM 50 MG/1
TABLET ORAL
Qty: 90 TABLET | Refills: 0 | Status: SHIPPED | OUTPATIENT
Start: 2024-05-30

## 2024-05-30 RX ORDER — BUSPIRONE HYDROCHLORIDE 5 MG/1
5 TABLET ORAL 3 TIMES DAILY
Qty: 270 TABLET | Refills: 1
Start: 2024-05-30 | End: 2024-06-29

## 2024-05-30 RX ORDER — CLONIDINE HYDROCHLORIDE 0.1 MG/1
0.1 TABLET ORAL 3 TIMES DAILY
Qty: 270 TABLET | Refills: 1
Start: 2024-05-30

## 2024-05-30 ASSESSMENT — ENCOUNTER SYMPTOMS
ABDOMINAL DISTENTION: 0
CHEST TIGHTNESS: 0
EYE DISCHARGE: 0

## 2024-05-30 ASSESSMENT — PATIENT HEALTH QUESTIONNAIRE - PHQ9
1. LITTLE INTEREST OR PLEASURE IN DOING THINGS: NOT AT ALL
SUM OF ALL RESPONSES TO PHQ QUESTIONS 1-9: 0
SUM OF ALL RESPONSES TO PHQ9 QUESTIONS 1 & 2: 0
SUM OF ALL RESPONSES TO PHQ QUESTIONS 1-9: 0
2. FEELING DOWN, DEPRESSED OR HOPELESS: NOT AT ALL
SUM OF ALL RESPONSES TO PHQ QUESTIONS 1-9: 0
SUM OF ALL RESPONSES TO PHQ QUESTIONS 1-9: 0

## 2024-05-30 ASSESSMENT — LIFESTYLE VARIABLES
HOW MANY STANDARD DRINKS CONTAINING ALCOHOL DO YOU HAVE ON A TYPICAL DAY: PATIENT DOES NOT DRINK
HOW OFTEN DO YOU HAVE A DRINK CONTAINING ALCOHOL: NEVER

## 2024-05-30 NOTE — PROGRESS NOTES
\"Have you been to the ER, urgent care clinic since your last visit?  Hospitalized since your last visit?\"    NO    “Have you seen or consulted any other health care providers outside of [facility] since your last visit?”    NO            Click Here for Release of Records Request  
tablet Take 1 tablet by mouth every morning (before breakfast) Yes Sarah Arguello APRN - NP   traZODone (DESYREL) 50 MG tablet Take 1 tablet by mouth nightly Yes Sarah Arguello APRN - NP   losartan (COZAAR) 50 MG tablet TAKE 1 TABLET BY MOUTH EVERY DAY Yes Muriel Ware APRN - NP   amLODIPine (NORVASC) 5 MG tablet TAKE 1 TABLET BY MOUTH EVERY DAY Yes Muriel Ware APRN - NP   Probiotic Product (PROBIOTIC DAILY PO) Take by mouth Yes Yudy Allen MD   escitalopram (LEXAPRO) 10 MG tablet Take 1 tablet by mouth daily Yes Sarah Arguello APRN - NP   rosuvastatin (CRESTOR) 20 MG tablet Take 1 tablet by mouth nightly Yes Sarah Arguello APRN - NP   fluticasone (FLONASE) 50 MCG/ACT nasal spray 2 sprays by Each Nostril route daily Yes Sarah Arguello APRN - NP   Omega-3 Fatty Acids (FISH OIL) 1000 MG capsule Take 1 capsule by mouth daily Yes Provider, MD Gabriela Jimenesam (Including outside providers/suppliers regularly involved in providing care):   Patient Care Team:  Sarah Arguello APRN - NP as PCP - General (Nurse Practitioner)  Sarah Arguello APRN - NP as PCP - Empaneled Provider     Reviewed and updated this visit:  Tobacco  Allergies  Meds  Problems  Med Hx  Surg Hx  Soc Hx  Fam Hx

## 2024-05-30 NOTE — PATIENT INSTRUCTIONS
condition or this instruction, always ask your healthcare professional. [employer], [employer] disclaims any warranty or liability for your use of this information.      Personalized Preventive Plan for Hanane Eastman - 5/30/2024  Medicare offers a range of preventive health benefits. Some of the tests and screenings are paid in full while other may be subject to a deductible, co-insurance, and/or copay.    Some of these benefits include a comprehensive review of your medical history including lifestyle, illnesses that may run in your family, and various assessments and screenings as appropriate.    After reviewing your medical record and screening and assessments performed today your provider may have ordered immunizations, labs, imaging, and/or referrals for you.  A list of these orders (if applicable) as well as your Preventive Care list are included within your After Visit Summary for your review.    Other Preventive Recommendations:    A preventive eye exam performed by an eye specialist is recommended every 1-2 years to screen for glaucoma; cataracts, macular degeneration, and other eye disorders.  A preventive dental visit is recommended every 6 months.  Try to get at least 150 minutes of exercise per week or 10,000 steps per day on a pedometer .  Order or download the FREE \"Exercise & Physical Activity: Your Everyday Guide\" from [employer] [place] [employer]. Call 1-267.201.9828 or search [employer] [place] [employer] online.  You need 8333-8729 mg of calcium and 2861-8309 IU of vitamin D per day. It is possible to meet your calcium requirement with diet alone, but a vitamin D supplement is usually necessary to meet this goal.  When exposed to the sun, use a sunscreen that protects against both UVA and UVB radiation with an SPF of 30 or greater. Reapply every 2 to 3 hours or after sweating, drying off with a towel, or swimming.  Always wear a seat belt when traveling in a car. Always wear a helmet when

## 2024-05-30 NOTE — TELEPHONE ENCOUNTER
Patient requesting refill on     Requested Prescriptions     Pending Prescriptions Disp Refills    amLODIPine (NORVASC) 5 MG tablet [Pharmacy Med Name: AMLODIPINE BESYLATE 5 MG TAB] 90 tablet 0     Sig: TAKE 1 TABLET BY MOUTH EVERY DAY    losartan (COZAAR) 50 MG tablet [Pharmacy Med Name: LOSARTAN POTASSIUM 50 MG TAB] 90 tablet 0     Sig: TAKE 1 TABLET BY MOUTH EVERY DAY        Last OV 1/2/2024

## 2024-06-03 RX ORDER — CLONIDINE HYDROCHLORIDE 0.1 MG/1
TABLET ORAL
Qty: 90 TABLET | Refills: 1 | OUTPATIENT
Start: 2024-06-03

## 2024-06-03 RX ORDER — CLONIDINE HYDROCHLORIDE 0.1 MG/1
0.1 TABLET ORAL 3 TIMES DAILY
Qty: 270 TABLET | Refills: 1 | Status: SHIPPED | OUTPATIENT
Start: 2024-06-03

## 2024-06-04 RX ORDER — BUSPIRONE HYDROCHLORIDE 5 MG/1
5 TABLET ORAL 3 TIMES DAILY
Qty: 270 TABLET | Refills: 1 | Status: SHIPPED | OUTPATIENT
Start: 2024-06-04 | End: 2024-09-02

## 2024-06-07 DIAGNOSIS — R53.83 OTHER FATIGUE: ICD-10-CM

## 2024-06-07 DIAGNOSIS — R61 NIGHT SWEATS: ICD-10-CM

## 2024-06-07 DIAGNOSIS — F41.9 ANXIETY: Primary | ICD-10-CM

## 2024-06-10 ENCOUNTER — NURSE ONLY (OUTPATIENT)
Age: 74
End: 2024-06-10
Payer: MEDICARE

## 2024-06-10 DIAGNOSIS — R53.83 OTHER FATIGUE: ICD-10-CM

## 2024-06-10 DIAGNOSIS — F41.9 ANXIETY: ICD-10-CM

## 2024-06-10 DIAGNOSIS — R61 NIGHT SWEATS: ICD-10-CM

## 2024-06-10 LAB
BASOPHILS # BLD: 0.1 K/UL (ref 0–0.1)
BASOPHILS NFR BLD: 1 % (ref 0–1)
DIFFERENTIAL METHOD BLD: ABNORMAL
EOSINOPHIL # BLD: 0.1 K/UL (ref 0–0.4)
EOSINOPHIL NFR BLD: 1 % (ref 0–7)
ERYTHROCYTE [DISTWIDTH] IN BLOOD BY AUTOMATED COUNT: 12.7 % (ref 11.5–14.5)
FSH SERPL-ACNC: 46 MIU/ML
HCT VFR BLD AUTO: 42.1 % (ref 35–47)
HGB BLD-MCNC: 13.9 G/DL (ref 11.5–16)
IMM GRANULOCYTES # BLD AUTO: 0 K/UL (ref 0–0.04)
IMM GRANULOCYTES NFR BLD AUTO: 0 % (ref 0–0.5)
LH SERPL-ACNC: 20.9 MIU/ML
LYMPHOCYTES # BLD: 3.8 K/UL (ref 0.8–3.5)
LYMPHOCYTES NFR BLD: 34 % (ref 12–49)
MCH RBC QN AUTO: 31 PG (ref 26–34)
MCHC RBC AUTO-ENTMCNC: 33 G/DL (ref 30–36.5)
MCV RBC AUTO: 93.8 FL (ref 80–99)
MONOCYTES # BLD: 0.9 K/UL (ref 0–1)
MONOCYTES NFR BLD: 8 % (ref 5–13)
NEUTS SEG # BLD: 6.2 K/UL (ref 1.8–8)
NEUTS SEG NFR BLD: 56 % (ref 32–75)
NRBC # BLD: 0 K/UL (ref 0–0.01)
NRBC BLD-RTO: 0 PER 100 WBC
PLATELET # BLD AUTO: 269 K/UL (ref 150–400)
PMV BLD AUTO: 10.3 FL (ref 8.9–12.9)
RBC # BLD AUTO: 4.49 M/UL (ref 3.8–5.2)
WBC # BLD AUTO: 11.1 K/UL (ref 3.6–11)

## 2024-06-10 PROCEDURE — 36415 COLL VENOUS BLD VENIPUNCTURE: CPT | Performed by: NURSE PRACTITIONER

## 2024-06-14 LAB
TESTOST FREE SERPL-MCNC: <0.2 PG/ML (ref 0–4.2)
TESTOST SERPL-MCNC: <3 NG/DL (ref 3–67)

## 2024-06-17 ENCOUNTER — HOSPITAL ENCOUNTER (OUTPATIENT)
Facility: HOSPITAL | Age: 74
Setting detail: OUTPATIENT SURGERY
Discharge: HOME | End: 2024-06-17
Attending: STUDENT IN AN ORGANIZED HEALTH CARE EDUCATION/TRAINING PROGRAM | Admitting: STUDENT IN AN ORGANIZED HEALTH CARE EDUCATION/TRAINING PROGRAM
Payer: MEDICARE

## 2024-06-17 VITALS
HEART RATE: 80 BPM | OXYGEN SATURATION: 94 % | RESPIRATION RATE: 16 BRPM | DIASTOLIC BLOOD PRESSURE: 89 MMHG | SYSTOLIC BLOOD PRESSURE: 184 MMHG

## 2024-06-17 PROCEDURE — 7100000010 HC PHASE II RECOVERY - FIRST 15 MIN: Performed by: STUDENT IN AN ORGANIZED HEALTH CARE EDUCATION/TRAINING PROGRAM

## 2024-06-17 PROCEDURE — 2709999900 HC NON-CHARGEABLE SUPPLY: Performed by: STUDENT IN AN ORGANIZED HEALTH CARE EDUCATION/TRAINING PROGRAM

## 2024-06-17 PROCEDURE — 6370000000 HC RX 637 (ALT 250 FOR IP): Performed by: STUDENT IN AN ORGANIZED HEALTH CARE EDUCATION/TRAINING PROGRAM

## 2024-06-17 PROCEDURE — 3600007502: Performed by: STUDENT IN AN ORGANIZED HEALTH CARE EDUCATION/TRAINING PROGRAM

## 2024-06-17 PROCEDURE — 3600007512: Performed by: STUDENT IN AN ORGANIZED HEALTH CARE EDUCATION/TRAINING PROGRAM

## 2024-06-17 RX ORDER — LIDOCAINE HYDROCHLORIDE 20 MG/ML
JELLY TOPICAL ONCE
Status: COMPLETED | OUTPATIENT
Start: 2024-06-17 | End: 2024-06-17

## 2024-06-17 RX ORDER — ASPIRIN 81 MG/1
81 TABLET ORAL DAILY
COMMUNITY

## 2024-06-17 RX ADMIN — LIDOCAINE HYDROCHLORIDE 6 ML: 20 JELLY TOPICAL at 10:11

## 2024-06-17 ASSESSMENT — PAIN DESCRIPTION - DESCRIPTORS
DESCRIPTORS: PRESSURE
DESCRIPTORS: PRESSURE

## 2024-06-17 ASSESSMENT — PAIN - FUNCTIONAL ASSESSMENT
PAIN_FUNCTIONAL_ASSESSMENT: 0-10
PAIN_FUNCTIONAL_ASSESSMENT: 0-10
PAIN_FUNCTIONAL_ASSESSMENT: ACTIVITIES ARE NOT PREVENTED

## 2024-06-17 NOTE — PROGRESS NOTES
3cc viscous lidocaine inhaled into right nare per MD orders.  Probe inserted into  right nare without difficulty.  Pt tolerated procedure well.

## 2024-06-19 DIAGNOSIS — R47.02 DYSPHASIA: ICD-10-CM

## 2024-06-19 RX ORDER — SUCRALFATE 1 G/1
1 TABLET ORAL 4 TIMES DAILY
Qty: 120 TABLET | Refills: 0 | Status: SHIPPED | OUTPATIENT
Start: 2024-06-19

## 2024-06-19 RX ORDER — PANTOPRAZOLE SODIUM 40 MG/1
40 TABLET, DELAYED RELEASE ORAL
Qty: 90 TABLET | Refills: 1 | Status: SHIPPED | OUTPATIENT
Start: 2024-06-19

## 2024-06-28 SDOH — ECONOMIC STABILITY: FOOD INSECURITY: WITHIN THE PAST 12 MONTHS, YOU WORRIED THAT YOUR FOOD WOULD RUN OUT BEFORE YOU GOT MONEY TO BUY MORE.: NEVER TRUE

## 2024-06-28 SDOH — ECONOMIC STABILITY: FOOD INSECURITY: WITHIN THE PAST 12 MONTHS, THE FOOD YOU BOUGHT JUST DIDN'T LAST AND YOU DIDN'T HAVE MONEY TO GET MORE.: NEVER TRUE

## 2024-06-28 SDOH — ECONOMIC STABILITY: TRANSPORTATION INSECURITY
IN THE PAST 12 MONTHS, HAS LACK OF TRANSPORTATION KEPT YOU FROM MEETINGS, WORK, OR FROM GETTING THINGS NEEDED FOR DAILY LIVING?: NO

## 2024-06-28 SDOH — ECONOMIC STABILITY: INCOME INSECURITY: HOW HARD IS IT FOR YOU TO PAY FOR THE VERY BASICS LIKE FOOD, HOUSING, MEDICAL CARE, AND HEATING?: NOT HARD AT ALL

## 2024-06-30 NOTE — PROGRESS NOTES
Lack of Transportation (Non-Medical): No   Physical Activity: Insufficiently Active (5/30/2024)    Exercise Vital Sign     Days of Exercise per Week: 2 days     Minutes of Exercise per Session: 30 min   Housing Stability: Unknown (6/28/2024)    Housing Stability Vital Sign     Unstable Housing in the Last Year: No       Family History   Problem Relation Age of Onset    Hypertension Daughter     Breast Cancer Sister         dx age 50    Hypertension Father     Heart Disease Father     Stroke Father     Hypertension Mother     Heart Attack Mother         65    Heart Disease Mother     Osteoarthritis Mother     Osteoporosis Mother        ROS:  Review of Systems   Constitutional:  Negative for activity change and appetite change.   HENT:  Negative for congestion.         Increase in saliva production   Eyes:  Negative for discharge.   Respiratory:  Negative for chest tightness.    Cardiovascular:  Negative for chest pain and leg swelling.   Gastrointestinal:  Positive for constipation. Negative for abdominal distention, diarrhea and nausea.   Endocrine: Negative for cold intolerance.   Genitourinary:  Negative for difficulty urinating.   Musculoskeletal:  Negative for arthralgias.   Psychiatric/Behavioral:  Positive for agitation and sleep disturbance. The patient is nervous/anxious.            Objective:     /70 (Site: Right Upper Arm, Position: Sitting, Cuff Size: Medium Adult)   Pulse 66   Temp 97 °F (36.1 °C) (Oral)   Resp 18   Ht 1.664 m (5' 5.5\")   Wt 54.9 kg (121 lb)   SpO2 98%   BMI 19.83 kg/m²   Body mass index is 19.83 kg/m².    Physical Exam  Vitals reviewed.   Constitutional:       General: She is not in acute distress.     Appearance: She is not ill-appearing or toxic-appearing.      Comments: Well appearing female in no distress today   HENT:      Head: Normocephalic.      Right Ear: External ear normal.      Left Ear: External ear normal.      Nose: Nose normal.      Mouth/Throat:      Mouth:

## 2024-07-01 ENCOUNTER — OFFICE VISIT (OUTPATIENT)
Age: 74
End: 2024-07-01

## 2024-07-01 VITALS
BODY MASS INDEX: 19.44 KG/M2 | DIASTOLIC BLOOD PRESSURE: 70 MMHG | WEIGHT: 121 LBS | OXYGEN SATURATION: 98 % | SYSTOLIC BLOOD PRESSURE: 117 MMHG | RESPIRATION RATE: 18 BRPM | HEART RATE: 66 BPM | TEMPERATURE: 97 F | HEIGHT: 66 IN

## 2024-07-01 DIAGNOSIS — F41.9 ANXIETY: Primary | ICD-10-CM

## 2024-07-01 DIAGNOSIS — I10 ESSENTIAL (PRIMARY) HYPERTENSION: ICD-10-CM

## 2024-07-01 DIAGNOSIS — G47.9 DIFFICULTY SLEEPING: ICD-10-CM

## 2024-07-01 RX ORDER — TRAZODONE HYDROCHLORIDE 100 MG/1
100 TABLET ORAL NIGHTLY
Qty: 90 TABLET | Refills: 1 | Status: SHIPPED | OUTPATIENT
Start: 2024-07-01

## 2024-07-01 RX ORDER — ESCITALOPRAM OXALATE 10 MG/1
5 TABLET ORAL DAILY
Qty: 45 TABLET | Refills: 1 | Status: SHIPPED | OUTPATIENT
Start: 2024-07-01

## 2024-07-01 RX ORDER — BUSPIRONE HYDROCHLORIDE 10 MG/1
10 TABLET ORAL 3 TIMES DAILY
Qty: 270 TABLET | Refills: 1 | Status: SHIPPED | OUTPATIENT
Start: 2024-07-01 | End: 2024-12-28

## 2024-07-01 RX ORDER — ESCITALOPRAM OXALATE 10 MG/1
5 TABLET ORAL DAILY
Qty: 45 TABLET | Refills: 1
Start: 2024-07-01 | End: 2024-07-01 | Stop reason: SDUPTHER

## 2024-07-01 ASSESSMENT — ENCOUNTER SYMPTOMS
NAUSEA: 0
CONSTIPATION: 1
DIARRHEA: 0

## 2024-07-06 DIAGNOSIS — E78.2 MIXED HYPERLIPIDEMIA: ICD-10-CM

## 2024-07-08 RX ORDER — ROSUVASTATIN CALCIUM 20 MG/1
20 TABLET, COATED ORAL NIGHTLY
Qty: 90 TABLET | Refills: 1 | Status: SHIPPED | OUTPATIENT
Start: 2024-07-08

## 2024-07-08 NOTE — TELEPHONE ENCOUNTER
Patient requesting refill on     Requested Prescriptions     Pending Prescriptions Disp Refills    rosuvastatin (CRESTOR) 20 MG tablet [Pharmacy Med Name: ROSUVASTATIN CALCIUM 20 MG TAB] 90 tablet 1     Sig: TAKE 1 TABLET BY MOUTH EVERY DAY AT NIGHT        Last OV 7/1/2024

## 2024-07-16 ENCOUNTER — CLINICAL DOCUMENTATION (OUTPATIENT)
Age: 74
End: 2024-07-16

## 2024-07-16 DIAGNOSIS — R47.02 DYSPHASIA: Primary | ICD-10-CM

## 2024-07-17 ENCOUNTER — CLINICAL DOCUMENTATION (OUTPATIENT)
Age: 74
End: 2024-07-17

## 2024-07-24 NOTE — OP NOTE
Esophageal Manometry and Impedance Testing  [facility]    [facility] ([facility])    Date of procedure: 6/17/24  Date of interpretation: 6/17/24    Patient Name: Hanane Eastman  Primary GI: Jyotsna Andre DO    Findings:  Median IRP: 13.2 (normal <15mmHg)- NORMAL    Swallows:  Failed: 0 %  Panesophageal pressurization (PEP): 0 %  Premature contraction: 0 %  Swallows with distal latency <4.5sec: % 0  Swallows with Distal Contractile Integral > 8000 mmHg-cm-s (normal 500-5000): 40%      Impedance testing:   Incomplete bolus Clearance: 10 %     Nanuet Classification 3.0 Finding: Jackhammer Esophagus (due to 20% of swallows having DCI > 8000mmHg-cm-sec).     Comments:  1) Findings are consistent with high DCI/Jackhammer Esophagus. Clinical symptoms are also consistent with this.    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  [facility]

## 2024-08-24 DIAGNOSIS — I10 ESSENTIAL (PRIMARY) HYPERTENSION: ICD-10-CM

## 2024-08-26 RX ORDER — AMLODIPINE BESYLATE 5 MG/1
TABLET ORAL
Qty: 90 TABLET | Refills: 0 | Status: SHIPPED | OUTPATIENT
Start: 2024-08-26

## 2024-08-26 RX ORDER — LOSARTAN POTASSIUM 50 MG/1
TABLET ORAL
Qty: 90 TABLET | Refills: 0 | Status: SHIPPED | OUTPATIENT
Start: 2024-08-26

## 2024-09-01 NOTE — PROGRESS NOTES
Hanane Eastman is a 74 y.o. female who presents today with c/o   Chief Complaint   Patient presents with    Follow-up     High blood pressure, anxiety, increased sputum production, spondylosis, rash on foot           Assessment/ Plan:       ASSESSMENT AND PLAN    Diagnoses:  1.  HTN  Stable  She stopped clonidine on her own  Cont to monitor at home  RTO in one month for follow up    2. Anxiety  Buspar was not effective  She stopped trazodone on her own  Continue lexapro  Recommended counselor but she declined on last OV    Will refer to psychiatry for recommendations, but [facility] does not accept patients >65 y.o. Patient interested in starting valium (something with a quicker onset). I discussed that valium would not be the best choice with her being 74 years old. I will try her on atarax and have her return in a few weeks to discuss    3. Increased sputum production and dysphagia  Seen with GI--had esophagus \"stretched.\"  Suspect her symptoms could be from GERD/LERD--advised to increase protonix to BID  Follow up with specialist as planned.    4. Spondylosis  Referral placed to Dr. Witt per patient request for recommendations  She is not interested in surgical management  I would recommend a round of physical therapy if she is willing    5. Rash on foot  Try clotrimazole. This appears to be a possible fungal rash so I am going to try and avoid a steroid.    Follow up with cardiology and gastroenterology as scheduled      Orders Placed This Encounter   Procedures    [facility] - Pancho Witt MD, Neurosurgery, [place]     Referral Priority:   Routine     Referral Type:   Eval and Treat     Referral Reason:   Specialty Services Required     Referred to Provider:   Pancho Witt MD     Requested Specialty:   Neurosurgery     Number of Visits Requested:   1     Return in about 4 weeks (around 10/1/2024) for rash on foot, discuss atarax and recent referral to neurosurgery.       Subjective:  Hanane Eastman is a 74

## 2024-09-03 ENCOUNTER — OFFICE VISIT (OUTPATIENT)
Age: 74
End: 2024-09-03
Payer: MEDICARE

## 2024-09-03 VITALS
TEMPERATURE: 97 F | RESPIRATION RATE: 18 BRPM | HEART RATE: 86 BPM | HEIGHT: 66 IN | DIASTOLIC BLOOD PRESSURE: 63 MMHG | OXYGEN SATURATION: 93 % | BODY MASS INDEX: 19.63 KG/M2 | SYSTOLIC BLOOD PRESSURE: 127 MMHG | WEIGHT: 122.13 LBS

## 2024-09-03 DIAGNOSIS — M47.9 SPONDYLOSIS: ICD-10-CM

## 2024-09-03 DIAGNOSIS — I10 ESSENTIAL (PRIMARY) HYPERTENSION: Primary | ICD-10-CM

## 2024-09-03 DIAGNOSIS — R09.3 INCREASED SPUTUM PRODUCTION: ICD-10-CM

## 2024-09-03 DIAGNOSIS — F41.9 ANXIETY: ICD-10-CM

## 2024-09-03 DIAGNOSIS — R21 RASH OF FOOT: ICD-10-CM

## 2024-09-03 PROCEDURE — 3074F SYST BP LT 130 MM HG: CPT | Performed by: NURSE PRACTITIONER

## 2024-09-03 PROCEDURE — 99214 OFFICE O/P EST MOD 30 MIN: CPT | Performed by: NURSE PRACTITIONER

## 2024-09-03 PROCEDURE — G8420 CALC BMI NORM PARAMETERS: HCPCS | Performed by: NURSE PRACTITIONER

## 2024-09-03 PROCEDURE — 1123F ACP DISCUSS/DSCN MKR DOCD: CPT | Performed by: NURSE PRACTITIONER

## 2024-09-03 PROCEDURE — G8427 DOCREV CUR MEDS BY ELIG CLIN: HCPCS | Performed by: NURSE PRACTITIONER

## 2024-09-03 PROCEDURE — 1090F PRES/ABSN URINE INCON ASSESS: CPT | Performed by: NURSE PRACTITIONER

## 2024-09-03 PROCEDURE — 4004F PT TOBACCO SCREEN RCVD TLK: CPT | Performed by: NURSE PRACTITIONER

## 2024-09-03 PROCEDURE — 3078F DIAST BP <80 MM HG: CPT | Performed by: NURSE PRACTITIONER

## 2024-09-03 PROCEDURE — G8399 PT W/DXA RESULTS DOCUMENT: HCPCS | Performed by: NURSE PRACTITIONER

## 2024-09-03 PROCEDURE — 3017F COLORECTAL CA SCREEN DOC REV: CPT | Performed by: NURSE PRACTITIONER

## 2024-09-03 RX ORDER — HYOSCYAMINE SULFATE 0.125 MG
TABLET,DISINTEGRATING ORAL
COMMUNITY
Start: 2024-07-24

## 2024-09-03 RX ORDER — CLOTRIMAZOLE 1 %
CREAM (GRAM) TOPICAL
Qty: 12 G | Refills: 1 | Status: SHIPPED | OUTPATIENT
Start: 2024-09-03 | End: 2024-09-10

## 2024-09-03 RX ORDER — HYDROXYZINE HYDROCHLORIDE 25 MG/1
25 TABLET, FILM COATED ORAL EVERY 8 HOURS PRN
Qty: 30 TABLET | Refills: 2 | Status: SHIPPED | OUTPATIENT
Start: 2024-09-03 | End: 2024-11-02

## 2024-09-03 RX ORDER — ESCITALOPRAM OXALATE 10 MG/1
10 TABLET ORAL DAILY
Qty: 90 TABLET | Refills: 1
Start: 2024-09-03

## 2024-09-03 RX ORDER — GLYCOPYRROLATE 1 MG/1
1 TABLET ORAL NIGHTLY
COMMUNITY
Start: 2024-08-20

## 2024-09-03 ASSESSMENT — PATIENT HEALTH QUESTIONNAIRE - PHQ9
SUM OF ALL RESPONSES TO PHQ QUESTIONS 1-9: 0
SUM OF ALL RESPONSES TO PHQ9 QUESTIONS 1 & 2: 0
1. LITTLE INTEREST OR PLEASURE IN DOING THINGS: NOT AT ALL
2. FEELING DOWN, DEPRESSED OR HOPELESS: NOT AT ALL
SUM OF ALL RESPONSES TO PHQ QUESTIONS 1-9: 0

## 2024-09-03 NOTE — PROGRESS NOTES
\"Have you been to the ER, urgent care clinic since your last visit?  Hospitalized since your last visit?\"    NO    “Have you seen or consulted any other health care providers outside of [facility] since your last visit?”    YES - When: approximately 1 months ago.  Where and Why: ENT.            Click Here for Release of Records Request

## 2024-09-05 ENCOUNTER — CLINICAL DOCUMENTATION (OUTPATIENT)
Age: 74
End: 2024-09-05

## 2024-09-12 ENCOUNTER — TELEPHONE (OUTPATIENT)
Age: 74
End: 2024-09-12

## 2024-09-12 ENCOUNTER — TRANSCRIBE ORDERS (OUTPATIENT)
Dept: SCHEDULING | Age: 74
End: 2024-09-12

## 2024-09-12 DIAGNOSIS — M54.12 CHRONIC CERVICAL RADICULOPATHY: Primary | ICD-10-CM

## 2024-09-17 ENCOUNTER — HOSPITAL ENCOUNTER (OUTPATIENT)
Facility: HOSPITAL | Age: 74
Discharge: HOME | End: 2024-09-20
Attending: NEUROLOGICAL SURGERY
Payer: MEDICARE

## 2024-09-17 DIAGNOSIS — M54.12 CHRONIC CERVICAL RADICULOPATHY: ICD-10-CM

## 2024-09-17 PROCEDURE — 72141 MRI NECK SPINE W/O DYE: CPT

## 2024-09-18 DIAGNOSIS — G47.9 DIFFICULTY SLEEPING: ICD-10-CM

## 2024-09-19 RX ORDER — TRAZODONE HYDROCHLORIDE 50 MG/1
50 TABLET, FILM COATED ORAL NIGHTLY
Qty: 90 TABLET | Refills: 1 | Status: SHIPPED | OUTPATIENT
Start: 2024-09-19

## 2024-09-19 RX ORDER — TRAZODONE HYDROCHLORIDE 50 MG/1
50 TABLET, FILM COATED ORAL NIGHTLY
Qty: 90 TABLET | Refills: 1 | OUTPATIENT
Start: 2024-09-19

## 2024-09-24 ASSESSMENT — ENCOUNTER SYMPTOMS
ABDOMINAL DISTENTION: 0
CHEST TIGHTNESS: 0
NAUSEA: 0
DIARRHEA: 0
EYE DISCHARGE: 0

## 2024-09-26 ENCOUNTER — OFFICE VISIT (OUTPATIENT)
Age: 74
End: 2024-09-26
Payer: MEDICARE

## 2024-09-26 VITALS
RESPIRATION RATE: 18 BRPM | TEMPERATURE: 97 F | SYSTOLIC BLOOD PRESSURE: 125 MMHG | WEIGHT: 120 LBS | OXYGEN SATURATION: 98 % | DIASTOLIC BLOOD PRESSURE: 80 MMHG | HEART RATE: 90 BPM | HEIGHT: 66 IN | BODY MASS INDEX: 19.29 KG/M2

## 2024-09-26 DIAGNOSIS — R09.3 INCREASED SPUTUM PRODUCTION: ICD-10-CM

## 2024-09-26 DIAGNOSIS — R21 RASH OF FOOT: ICD-10-CM

## 2024-09-26 DIAGNOSIS — Z23 ENCOUNTER FOR ADMINISTRATION OF VACCINE: ICD-10-CM

## 2024-09-26 DIAGNOSIS — R73.09 ELEVATED GLUCOSE: ICD-10-CM

## 2024-09-26 DIAGNOSIS — R35.89 POLYURIA: ICD-10-CM

## 2024-09-26 DIAGNOSIS — I10 ESSENTIAL (PRIMARY) HYPERTENSION: Primary | ICD-10-CM

## 2024-09-26 DIAGNOSIS — R47.02 DYSPHASIA: ICD-10-CM

## 2024-09-26 DIAGNOSIS — M47.9 SPONDYLOSIS: ICD-10-CM

## 2024-09-26 DIAGNOSIS — F41.9 ANXIETY: ICD-10-CM

## 2024-09-26 DIAGNOSIS — Z87.19 HISTORY OF BARRETT'S ESOPHAGUS: ICD-10-CM

## 2024-09-26 LAB
BILIRUBIN, URINE, POC: NEGATIVE
BLOOD URINE, POC: NEGATIVE
GLUCOSE URINE, POC: NEGATIVE
KETONES, URINE, POC: NEGATIVE
LEUKOCYTE ESTERASE, URINE, POC: NEGATIVE
NITRITE, URINE, POC: NEGATIVE
PH, URINE, POC: 7 (ref 4.6–8)
PROTEIN,URINE, POC: NEGATIVE
SPECIFIC GRAVITY, URINE, POC: 1.01 (ref 1–1.03)
URINALYSIS CLARITY, POC: CLEAR
URINALYSIS COLOR, POC: YELLOW
UROBILINOGEN, POC: NORMAL

## 2024-09-26 PROCEDURE — G0008 ADMIN INFLUENZA VIRUS VAC: HCPCS | Performed by: NURSE PRACTITIONER

## 2024-09-26 PROCEDURE — 99214 OFFICE O/P EST MOD 30 MIN: CPT | Performed by: NURSE PRACTITIONER

## 2024-09-26 PROCEDURE — G8399 PT W/DXA RESULTS DOCUMENT: HCPCS | Performed by: NURSE PRACTITIONER

## 2024-09-26 PROCEDURE — 36415 COLL VENOUS BLD VENIPUNCTURE: CPT | Performed by: NURSE PRACTITIONER

## 2024-09-26 PROCEDURE — 81003 URINALYSIS AUTO W/O SCOPE: CPT | Performed by: NURSE PRACTITIONER

## 2024-09-26 PROCEDURE — 1090F PRES/ABSN URINE INCON ASSESS: CPT | Performed by: NURSE PRACTITIONER

## 2024-09-26 PROCEDURE — 3017F COLORECTAL CA SCREEN DOC REV: CPT | Performed by: NURSE PRACTITIONER

## 2024-09-26 PROCEDURE — G8427 DOCREV CUR MEDS BY ELIG CLIN: HCPCS | Performed by: NURSE PRACTITIONER

## 2024-09-26 PROCEDURE — 4004F PT TOBACCO SCREEN RCVD TLK: CPT | Performed by: NURSE PRACTITIONER

## 2024-09-26 PROCEDURE — 3074F SYST BP LT 130 MM HG: CPT | Performed by: NURSE PRACTITIONER

## 2024-09-26 PROCEDURE — 1123F ACP DISCUSS/DSCN MKR DOCD: CPT | Performed by: NURSE PRACTITIONER

## 2024-09-26 PROCEDURE — 90653 IIV ADJUVANT VACCINE IM: CPT | Performed by: NURSE PRACTITIONER

## 2024-09-26 PROCEDURE — G8420 CALC BMI NORM PARAMETERS: HCPCS | Performed by: NURSE PRACTITIONER

## 2024-09-26 PROCEDURE — 3079F DIAST BP 80-89 MM HG: CPT | Performed by: NURSE PRACTITIONER

## 2024-09-26 ASSESSMENT — ENCOUNTER SYMPTOMS: CONSTIPATION: 1

## 2024-09-27 LAB
ALBUMIN SERPL-MCNC: 4 G/DL (ref 3.5–5)
ALBUMIN/GLOB SERPL: 1.4 (ref 1.1–2.2)
ALP SERPL-CCNC: 70 U/L (ref 45–117)
ALT SERPL-CCNC: 27 U/L (ref 12–78)
ANION GAP SERPL CALC-SCNC: 3 MMOL/L (ref 2–12)
AST SERPL-CCNC: 22 U/L (ref 15–37)
BASOPHILS # BLD: 0.1 K/UL (ref 0–0.1)
BASOPHILS NFR BLD: 1 % (ref 0–1)
BILIRUB SERPL-MCNC: 0.3 MG/DL (ref 0.2–1)
BUN SERPL-MCNC: 13 MG/DL (ref 6–20)
BUN/CREAT SERPL: 24 (ref 12–20)
CALCIUM SERPL-MCNC: 9.4 MG/DL (ref 8.5–10.1)
CHLORIDE SERPL-SCNC: 102 MMOL/L (ref 97–108)
CHOLEST SERPL-MCNC: 174 MG/DL
CO2 SERPL-SCNC: 34 MMOL/L (ref 21–32)
CREAT SERPL-MCNC: 0.55 MG/DL (ref 0.55–1.02)
DIFFERENTIAL METHOD BLD: ABNORMAL
EOSINOPHIL # BLD: 0.1 K/UL (ref 0–0.4)
EOSINOPHIL NFR BLD: 1 % (ref 0–7)
ERYTHROCYTE [DISTWIDTH] IN BLOOD BY AUTOMATED COUNT: 13.2 % (ref 11.5–14.5)
EST. AVERAGE GLUCOSE BLD GHB EST-MCNC: 103 MG/DL
GLOBULIN SER CALC-MCNC: 2.8 G/DL (ref 2–4)
GLUCOSE SERPL-MCNC: 97 MG/DL (ref 65–100)
HBA1C MFR BLD: 5.2 % (ref 4–5.6)
HCT VFR BLD AUTO: 43.2 % (ref 35–47)
HDLC SERPL-MCNC: 72 MG/DL
HDLC SERPL: 2.4 (ref 0–5)
HGB BLD-MCNC: 14.1 G/DL (ref 11.5–16)
IMM GRANULOCYTES # BLD AUTO: 0 K/UL (ref 0–0.04)
IMM GRANULOCYTES NFR BLD AUTO: 0 % (ref 0–0.5)
LDLC SERPL CALC-MCNC: 70.6 MG/DL (ref 0–100)
LYMPHOCYTES # BLD: 3.9 K/UL (ref 0.8–3.5)
LYMPHOCYTES NFR BLD: 37 % (ref 12–49)
MCH RBC QN AUTO: 30.7 PG (ref 26–34)
MCHC RBC AUTO-ENTMCNC: 32.6 G/DL (ref 30–36.5)
MCV RBC AUTO: 93.9 FL (ref 80–99)
MONOCYTES # BLD: 1 K/UL (ref 0–1)
MONOCYTES NFR BLD: 10 % (ref 5–13)
NEUTS SEG # BLD: 5.3 K/UL (ref 1.8–8)
NEUTS SEG NFR BLD: 51 % (ref 32–75)
NRBC # BLD: 0 K/UL (ref 0–0.01)
NRBC BLD-RTO: 0 PER 100 WBC
PLATELET # BLD AUTO: 279 K/UL (ref 150–400)
PMV BLD AUTO: 10.2 FL (ref 8.9–12.9)
POTASSIUM SERPL-SCNC: 4.6 MMOL/L (ref 3.5–5.1)
PROT SERPL-MCNC: 6.8 G/DL (ref 6.4–8.2)
RBC # BLD AUTO: 4.6 M/UL (ref 3.8–5.2)
SODIUM SERPL-SCNC: 139 MMOL/L (ref 136–145)
TRIGL SERPL-MCNC: 157 MG/DL
TSH SERPL DL<=0.05 MIU/L-ACNC: 0.64 UIU/ML (ref 0.36–3.74)
VLDLC SERPL CALC-MCNC: 31.4 MG/DL
WBC # BLD AUTO: 10.3 K/UL (ref 3.6–11)

## 2024-10-15 ENCOUNTER — HOSPITAL ENCOUNTER (OUTPATIENT)
Facility: HOSPITAL | Age: 74
Discharge: HOME | End: 2024-10-18
Payer: MEDICARE

## 2024-10-15 VITALS
OXYGEN SATURATION: 99 % | SYSTOLIC BLOOD PRESSURE: 132 MMHG | WEIGHT: 117.28 LBS | BODY MASS INDEX: 19.54 KG/M2 | DIASTOLIC BLOOD PRESSURE: 68 MMHG | RESPIRATION RATE: 20 BRPM | HEIGHT: 65 IN | HEART RATE: 86 BPM | TEMPERATURE: 98 F

## 2024-10-15 LAB
ABO + RH BLD: NORMAL
ALBUMIN SERPL-MCNC: 4.1 G/DL (ref 3.5–5)
ALBUMIN/GLOB SERPL: 1.1 (ref 1.1–2.2)
ALP SERPL-CCNC: 67 U/L (ref 45–117)
ALT SERPL-CCNC: 25 U/L (ref 12–78)
ANION GAP SERPL CALC-SCNC: 3 MMOL/L (ref 2–12)
APPEARANCE UR: CLEAR
APTT PPP: 30.1 SEC (ref 22.1–31)
AST SERPL-CCNC: 18 U/L (ref 15–37)
BACTERIA URNS QL MICRO: NEGATIVE /HPF
BASOPHILS # BLD: 0.1 K/UL (ref 0–0.1)
BASOPHILS NFR BLD: 1 % (ref 0–1)
BILIRUB SERPL-MCNC: 0.6 MG/DL (ref 0.2–1)
BILIRUB UR QL: NEGATIVE
BLOOD GROUP ANTIBODIES SERPL: NORMAL
BUN SERPL-MCNC: 12 MG/DL (ref 6–20)
BUN/CREAT SERPL: 20 (ref 12–20)
CALCIUM SERPL-MCNC: 9.5 MG/DL (ref 8.5–10.1)
CHLORIDE SERPL-SCNC: 97 MMOL/L (ref 97–108)
CO2 SERPL-SCNC: 34 MMOL/L (ref 21–32)
COLOR UR: NORMAL
CREAT SERPL-MCNC: 0.61 MG/DL (ref 0.55–1.02)
DIFFERENTIAL METHOD BLD: ABNORMAL
EOSINOPHIL # BLD: 0.1 K/UL (ref 0–0.4)
EOSINOPHIL NFR BLD: 1 % (ref 0–7)
EPITH CASTS URNS QL MICRO: NORMAL /LPF
ERYTHROCYTE [DISTWIDTH] IN BLOOD BY AUTOMATED COUNT: 12.9 % (ref 11.5–14.5)
GLOBULIN SER CALC-MCNC: 3.6 G/DL (ref 2–4)
GLUCOSE SERPL-MCNC: 132 MG/DL (ref 65–100)
GLUCOSE UR STRIP.AUTO-MCNC: NEGATIVE MG/DL
HCT VFR BLD AUTO: 48.9 % (ref 35–47)
HGB BLD-MCNC: 16 G/DL (ref 11.5–16)
HGB UR QL STRIP: NEGATIVE
HYALINE CASTS URNS QL MICRO: NORMAL /LPF (ref 0–2)
IMM GRANULOCYTES # BLD AUTO: 0.1 K/UL (ref 0–0.04)
IMM GRANULOCYTES NFR BLD AUTO: 0 % (ref 0–0.5)
INR PPP: 1 (ref 0.9–1.1)
KETONES UR QL STRIP.AUTO: NEGATIVE MG/DL
LEUKOCYTE ESTERASE UR QL STRIP.AUTO: NEGATIVE
LYMPHOCYTES # BLD: 3.6 K/UL (ref 0.8–3.5)
LYMPHOCYTES NFR BLD: 25 % (ref 12–49)
MCH RBC QN AUTO: 30.8 PG (ref 26–34)
MCHC RBC AUTO-ENTMCNC: 32.7 G/DL (ref 30–36.5)
MCV RBC AUTO: 94.2 FL (ref 80–99)
MONOCYTES # BLD: 1 K/UL (ref 0–1)
MONOCYTES NFR BLD: 7 % (ref 5–13)
NEUTS SEG # BLD: 9.4 K/UL (ref 1.8–8)
NEUTS SEG NFR BLD: 66 % (ref 32–75)
NITRITE UR QL STRIP.AUTO: NEGATIVE
NRBC # BLD: 0 K/UL (ref 0–0.01)
NRBC BLD-RTO: 0 PER 100 WBC
PH UR STRIP: 6.5 (ref 5–8)
PLATELET # BLD AUTO: 264 K/UL (ref 150–400)
PMV BLD AUTO: 9.4 FL (ref 8.9–12.9)
POTASSIUM SERPL-SCNC: 3.6 MMOL/L (ref 3.5–5.1)
PROT SERPL-MCNC: 7.7 G/DL (ref 6.4–8.2)
PROT UR STRIP-MCNC: NEGATIVE MG/DL
PROTHROMBIN TIME: 10.2 SEC (ref 9–11.1)
RBC # BLD AUTO: 5.19 M/UL (ref 3.8–5.2)
RBC #/AREA URNS HPF: NORMAL /HPF (ref 0–5)
SODIUM SERPL-SCNC: 134 MMOL/L (ref 136–145)
SP GR UR REFRACTOMETRY: 1.01
SPECIMEN EXP DATE BLD: NORMAL
THERAPEUTIC RANGE: NORMAL SECS (ref 58–77)
URINE CULTURE IF INDICATED: NORMAL
UROBILINOGEN UR QL STRIP.AUTO: 0.2 EU/DL (ref 0.2–1)
WBC # BLD AUTO: 14.3 K/UL (ref 3.6–11)
WBC URNS QL MICRO: NORMAL /HPF (ref 0–4)

## 2024-10-15 PROCEDURE — 85025 COMPLETE CBC W/AUTO DIFF WBC: CPT

## 2024-10-15 PROCEDURE — 85610 PROTHROMBIN TIME: CPT

## 2024-10-15 PROCEDURE — 81001 URINALYSIS AUTO W/SCOPE: CPT

## 2024-10-15 PROCEDURE — 80053 COMPREHEN METABOLIC PANEL: CPT

## 2024-10-15 PROCEDURE — 97530 THERAPEUTIC ACTIVITIES: CPT

## 2024-10-15 PROCEDURE — 36415 COLL VENOUS BLD VENIPUNCTURE: CPT

## 2024-10-15 PROCEDURE — 86850 RBC ANTIBODY SCREEN: CPT

## 2024-10-15 PROCEDURE — 83036 HEMOGLOBIN GLYCOSYLATED A1C: CPT

## 2024-10-15 PROCEDURE — 97161 PT EVAL LOW COMPLEX 20 MIN: CPT

## 2024-10-15 PROCEDURE — 86900 BLOOD TYPING SEROLOGIC ABO: CPT

## 2024-10-15 PROCEDURE — 86901 BLOOD TYPING SEROLOGIC RH(D): CPT

## 2024-10-15 PROCEDURE — 85730 THROMBOPLASTIN TIME PARTIAL: CPT

## 2024-10-15 RX ORDER — SODIUM CHLORIDE, SODIUM LACTATE, POTASSIUM CHLORIDE, CALCIUM CHLORIDE 600; 310; 30; 20 MG/100ML; MG/100ML; MG/100ML; MG/100ML
INJECTION, SOLUTION INTRAVENOUS CONTINUOUS
OUTPATIENT
Start: 2024-10-28

## 2024-10-15 RX ORDER — ISOSORBIDE DINITRATE 5 MG/1
5 TABLET ORAL 2 TIMES DAILY
COMMUNITY

## 2024-10-15 RX ORDER — PREGABALIN 150 MG/1
150 CAPSULE ORAL ONCE
OUTPATIENT
Start: 2024-10-28

## 2024-10-15 RX ORDER — ACETAMINOPHEN 500 MG
1000 TABLET ORAL ONCE
OUTPATIENT
Start: 2024-10-28

## 2024-10-15 ASSESSMENT — PAIN DESCRIPTION - LOCATION: LOCATION: ARM;NECK

## 2024-10-15 ASSESSMENT — PAIN SCALES - GENERAL: PAINLEVEL_OUTOF10: 4

## 2024-10-15 NOTE — PERIOP NOTE
[facility]  \"We've Got Your Back! Caring For Yourself Before and After Spine Surgery\" handbook was provided & reviewed with the patient during the pre-admission testing (PAT) appointment. An opportunity for questions was provided, patient verbalized understanding.

## 2024-10-15 NOTE — PERIOP NOTE
[facility]  Joint/Spine Preoperative Instructions        Surgery Date Monday, 10/28          Time of Arrival TBD/TBC @ [phone]    1. On the day of your surgery, please report to the Surgical Services Registration Desk and sign in at your designated time. The Surgery Center is located to the right of the Emergency Room.     2. You must have someone with you to drive you home. You should not drive a car for 24 hours following surgery. Please make arrangements for a friend or family member to stay with you for the first 24 hours after your surgery.    3. No food after midnight the night prior to surgery.  Medications morning of surgery should be taken with a sip of water.  Please follow pre-surgery drink instructions that were given at your Pre Admission Testing appointment.      4. We recommend you do not drink any alcoholic beverages for 24 hours before and after your surgery.    5. Contact your surgeon’s office for instructions on the following medications: vitamins and supplements. (Some surgeon’s will want you to stop these medications prior to surgery and others may allow you to take them)  **If you are currently taking Plavix, Coumadin, Aspirin and/or other blood-thinning agents, contact your surgeon for instructions.** Your surgeon will partner with the physician prescribing these medications to determine if it is safe to stop or if you need to continue taking.  Please do not stop taking these medications without instructions from your surgeon    6. Wear comfortable clothes.  Wear glasses instead of contacts.  Do not bring any money or jewelry. Please bring picture ID, insurance card, and any prearranged co-payment or hospital payment.  Do not wear make-up, particularly mascara the morning of your surgery.  Do not wear nail polish, particularly if you are having foot /hand surgery.  Wear your hair loose or down, no ponytails, buns, bette pins or clips.  All body piercings

## 2024-10-15 NOTE — PERIOP NOTE

## 2024-10-15 NOTE — PERIOP NOTE

## 2024-10-16 DIAGNOSIS — R47.02 DYSPHASIA: ICD-10-CM

## 2024-10-16 LAB
BACTERIA SPEC CULT: NORMAL
BACTERIA SPEC CULT: NORMAL
EST. AVERAGE GLUCOSE BLD GHB EST-MCNC: 111 MG/DL
HBA1C MFR BLD: 5.5 % (ref 4–5.6)
SERVICE CMNT-IMP: NORMAL

## 2024-10-17 RX ORDER — MIRTAZAPINE 7.5 MG/1
7.5-15 TABLET, FILM COATED ORAL NIGHTLY
Qty: 90 TABLET | Refills: 0 | OUTPATIENT
Start: 2024-10-17

## 2024-10-17 RX ORDER — PANTOPRAZOLE SODIUM 40 MG/1
40 TABLET, DELAYED RELEASE ORAL 2 TIMES DAILY
Qty: 180 TABLET | Refills: 1 | Status: SHIPPED | OUTPATIENT
Start: 2024-10-17

## 2024-10-17 NOTE — TELEPHONE ENCOUNTER
Spoke with the patient and she is on trazodone she has told the pharmacy and they keep sending it to you

## 2024-10-17 NOTE — PERIOP NOTE
Spoke to Kym in Dr. Witt's office and he reviewed the labs.  Okay to proceed with planned procedure.

## 2024-10-24 NOTE — DISCHARGE INSTRUCTIONS
Obstetrical Discharge Form        Patient ID:  Westley Villasenor  [id number]  37 y.o.  1985    Admit date: 2021    Discharge date: 2021     Admitting Physician: Felton Mays    Discharge Diagnoses: Pregnant and not yet delivered in third trimester [Z34.93]    Final Diagnosis:   Active Problems:    Pregnant and not yet delivered in third trimester  Resolved Problems:    * No resolved hospital problems. *      Discharged Condition: good      Gestational Age:39w6d    Antepartum complications: none    Date of Delivery: 21     Type of Delivery: vaginal, spontaneous    Delivered By: Roverto Bardales MD         Baby:     Information for the patient's :  Paul Ramos Girl Tamara Reese [[id number]]          Anesthesia: Epidural    Intrapartum complications: None    Feeding method: breast    Hospital Course: Uneventful.   Patient recovered well without any issues     Discharged Condition: stable to home    Discharge Medication:    Meribeth Duet   Home Medication Instructions QEJ:[id number]    Printed on:21 1249   Medication Information                      ibuprofen (ADVIL;MOTRIN) 800 MG tablet  Take 1 tablet by mouth every 8 hours             Prenatal Vit-Fe Fumarate-FA (PRENATAL PLUS) 27-1 MG TABS  Take 1 tablet by mouth daily                  Postpartum complications: none    Note that over 30 minutes was spent in preparing discharge papers, discussing discharge with patient, medication review, etc.    Discharge Date: 2021    Plan:   Follow up in 6 week(s) SPINE DISCHARGE  INSTRUCTIONS    Pancho Witt M.D.      What can I do?  The only exercise you should do is walking.  Start by walking twice a day for 5 minutes, then increase by  2-3 minutes every day until you reach 25 minutes twice a day.  DO NOT sit for long periods of time (20 minutes at a time for the first couple of weeks, then gradually increase.  No heavy lifting (5 lbs max); no straining, twisting, or bending.  No driving until your physician tells you it is ok.  It is, however, ok to ride short distances in a car.  If you are required to wear a back brace, you may remove it when you are sleeping unless your doctor has advised against it.  If you are required to wear a cervical collar, you must sleep in it.  You can remove it only for showers.    What can I eat?   You may resume your regular home diet as tolerated.  If your throat is sore, you may want to eat soft food for the first few days.    When can I take a shower?  You may shower leaving on your occlusive (waterproof) dressing on allowing water to run over the dressing.  The dressing may be removed in 5 days.  The small pieces of tape (steri strips)  underneath it should stay on.  Let water run over them, then pat dry gently.  Do not take baths or soak in pools.  You may remove your brace for showering.    Medications:  Check with your physician before taking any anti-inflammatory medicines (Advil, Aleve, ibuprofen, aspirin).  Take prescription medicine as directed.  DO NOT take more than prescribed.  Call your physician if the prescribed dose is not sufficiently controlling your pain.  It is important to have regular bowel movements.  Pain medications may cause constipation.  Drink plenty of fluids, get enough fiber in your diet, and use stool softeners and drink prune juice to help prevent constipation.  Do not take laxatives if at all possible except in severe situations.  It can result in a vicious cycle of constipation and diarrhea.  Do not put

## 2024-10-28 ENCOUNTER — APPOINTMENT (OUTPATIENT)
Facility: HOSPITAL | Age: 74
DRG: 471 | End: 2024-10-28
Attending: NEUROLOGICAL SURGERY
Payer: MEDICARE

## 2024-10-28 ENCOUNTER — ANESTHESIA (OUTPATIENT)
Facility: HOSPITAL | Age: 74
DRG: 471 | End: 2024-10-28
Payer: MEDICARE

## 2024-10-28 ENCOUNTER — ANESTHESIA EVENT (OUTPATIENT)
Facility: HOSPITAL | Age: 74
DRG: 471 | End: 2024-10-28
Payer: MEDICARE

## 2024-10-28 ENCOUNTER — HOSPITAL ENCOUNTER (INPATIENT)
Facility: HOSPITAL | Age: 74
LOS: 1 days | Discharge: HOME | DRG: 471 | End: 2024-10-30
Attending: NEUROLOGICAL SURGERY | Admitting: NEUROLOGICAL SURGERY
Payer: MEDICARE

## 2024-10-28 DIAGNOSIS — M50.821 OTHER CERVICAL DISC DISORDERS AT C4-C5 LEVEL: Primary | ICD-10-CM

## 2024-10-28 PROBLEM — M50.20 CERVICAL DISC HERNIATION: Status: ACTIVE | Noted: 2024-10-28

## 2024-10-28 LAB
ARTERIAL PATENCY WRIST A: YES
ARTERIAL PATENCY WRIST A: YES
BASE DEFICIT BLDA-SCNC: 0.1 MMOL/L
BDY SITE: ABNORMAL
BDY SITE: ABNORMAL
GAS FLOW.O2 O2 DELIVERY SYS: 15 L/MIN
GLUCOSE BLD STRIP.AUTO-MCNC: 134 MG/DL (ref 65–117)
HCO3 BLDA-SCNC: 24 MMOL/L (ref 22–26)
PCO2 BLDA: 36 MMHG (ref 35–45)
PCO2 BLDA: >150 MMHG (ref 35–45)
PH BLDA: 6.98 (ref 7.35–7.45)
PH BLDA: 7.44 (ref 7.35–7.45)
PO2 BLDA: 198 MMHG (ref 80–100)
PO2 BLDA: 264 MMHG (ref 80–100)
SAO2 % BLD: 100 % (ref 92–97)
SAO2% DEVICE SAO2% SENSOR NAME: ABNORMAL
SAO2% DEVICE SAO2% SENSOR NAME: ABNORMAL
SERVICE CMNT-IMP: ABNORMAL
SERVICE CMNT-IMP: ABNORMAL
SPECIMEN SITE: ABNORMAL
SPECIMEN SITE: ABNORMAL

## 2024-10-28 PROCEDURE — 0RB30ZZ EXCISION OF CERVICAL VERTEBRAL DISC, OPEN APPROACH: ICD-10-PCS | Performed by: NEUROLOGICAL SURGERY

## 2024-10-28 PROCEDURE — 2580000003 HC RX 258: Performed by: NEUROLOGICAL SURGERY

## 2024-10-28 PROCEDURE — 3600000014 HC SURGERY LEVEL 4 ADDTL 15MIN: Performed by: NEUROLOGICAL SURGERY

## 2024-10-28 PROCEDURE — 6370000000 HC RX 637 (ALT 250 FOR IP): Performed by: NEUROLOGICAL SURGERY

## 2024-10-28 PROCEDURE — 71045 X-RAY EXAM CHEST 1 VIEW: CPT

## 2024-10-28 PROCEDURE — 7100000000 HC PACU RECOVERY - FIRST 15 MIN: Performed by: NEUROLOGICAL SURGERY

## 2024-10-28 PROCEDURE — 2500000003 HC RX 250 WO HCPCS: Performed by: NURSE PRACTITIONER

## 2024-10-28 PROCEDURE — 72125 CT NECK SPINE W/O DYE: CPT

## 2024-10-28 PROCEDURE — 2720000010 HC SURG SUPPLY STERILE: Performed by: NEUROLOGICAL SURGERY

## 2024-10-28 PROCEDURE — 6360000002 HC RX W HCPCS: Performed by: NURSE ANESTHETIST, CERTIFIED REGISTERED

## 2024-10-28 PROCEDURE — 2580000003 HC RX 258: Performed by: STUDENT IN AN ORGANIZED HEALTH CARE EDUCATION/TRAINING PROGRAM

## 2024-10-28 PROCEDURE — 2500000003 HC RX 250 WO HCPCS: Performed by: NURSE ANESTHETIST, CERTIFIED REGISTERED

## 2024-10-28 PROCEDURE — 3700000000 HC ANESTHESIA ATTENDED CARE: Performed by: NEUROLOGICAL SURGERY

## 2024-10-28 PROCEDURE — G0378 HOSPITAL OBSERVATION PER HR: HCPCS

## 2024-10-28 PROCEDURE — 82803 BLOOD GASES ANY COMBINATION: CPT

## 2024-10-28 PROCEDURE — 2580000003 HC RX 258: Performed by: NURSE ANESTHETIST, CERTIFIED REGISTERED

## 2024-10-28 PROCEDURE — 2709999900 HC NON-CHARGEABLE SUPPLY: Performed by: NEUROLOGICAL SURGERY

## 2024-10-28 PROCEDURE — C1713 ANCHOR/SCREW BN/BN,TIS/BN: HCPCS | Performed by: NEUROLOGICAL SURGERY

## 2024-10-28 PROCEDURE — 2000000000 HC ICU R&B

## 2024-10-28 PROCEDURE — 3600000004 HC SURGERY LEVEL 4 BASE: Performed by: NEUROLOGICAL SURGERY

## 2024-10-28 PROCEDURE — 0BH17EZ INSERTION OF ENDOTRACHEAL AIRWAY INTO TRACHEA, VIA NATURAL OR ARTIFICIAL OPENING: ICD-10-PCS | Performed by: NURSE PRACTITIONER

## 2024-10-28 PROCEDURE — 36600 WITHDRAWAL OF ARTERIAL BLOOD: CPT

## 2024-10-28 PROCEDURE — 3700000001 HC ADD 15 MINUTES (ANESTHESIA): Performed by: NEUROLOGICAL SURGERY

## 2024-10-28 PROCEDURE — 6360000002 HC RX W HCPCS: Performed by: NEUROLOGICAL SURGERY

## 2024-10-28 PROCEDURE — 70450 CT HEAD/BRAIN W/O DYE: CPT

## 2024-10-28 PROCEDURE — 5A1935Z RESPIRATORY VENTILATION, LESS THAN 24 CONSECUTIVE HOURS: ICD-10-PCS | Performed by: NEUROLOGICAL SURGERY

## 2024-10-28 PROCEDURE — 94002 VENT MGMT INPAT INIT DAY: CPT

## 2024-10-28 PROCEDURE — 6360000002 HC RX W HCPCS

## 2024-10-28 PROCEDURE — C1821 INTERSPINOUS IMPLANT: HCPCS | Performed by: NEUROLOGICAL SURGERY

## 2024-10-28 PROCEDURE — 7100000001 HC PACU RECOVERY - ADDTL 15 MIN: Performed by: NEUROLOGICAL SURGERY

## 2024-10-28 PROCEDURE — 0RG10A0 FUSION OF CERVICAL VERTEBRAL JOINT WITH INTERBODY FUSION DEVICE, ANTERIOR APPROACH, ANTERIOR COLUMN, OPEN APPROACH: ICD-10-PCS | Performed by: NEUROLOGICAL SURGERY

## 2024-10-28 PROCEDURE — 82962 GLUCOSE BLOOD TEST: CPT

## 2024-10-28 PROCEDURE — 6360000002 HC RX W HCPCS: Performed by: NURSE PRACTITIONER

## 2024-10-28 PROCEDURE — 31500 INSERT EMERGENCY AIRWAY: CPT

## 2024-10-28 DEVICE — I-FACTOR PUTTY, 1.0CC SYRINGE
Type: IMPLANTABLE DEVICE | Site: SPINE CERVICAL | Status: FUNCTIONAL
Brand: I-FACTOR PEPTIDE ENHANCED BONE GRAFT

## 2024-10-28 DEVICE — INTERBODY, 14X13X6MM, 7 DEGREE, RT
Type: IMPLANTABLE DEVICE | Site: SPINE CERVICAL | Status: FUNCTIONAL
Brand: SHORELINE RT

## 2024-10-28 DEVICE — 6MM, LOCKING COVER
Type: IMPLANTABLE DEVICE | Site: SPINE CERVICAL | Status: FUNCTIONAL
Brand: SHORELINE ACS

## 2024-10-28 DEVICE — 3.5MM VARIABLE SCREW, SELF DRILLING, 14MM
Type: IMPLANTABLE DEVICE | Site: SPINE CERVICAL | Status: FUNCTIONAL
Brand: SHORELINE ACS

## 2024-10-28 DEVICE — 6MM, 2-HOLE ANTERIOR PLATE
Type: IMPLANTABLE DEVICE | Site: SPINE CERVICAL | Status: FUNCTIONAL
Brand: SHORELINE ACS

## 2024-10-28 RX ORDER — DEXMEDETOMIDINE HYDROCHLORIDE 4 UG/ML
.1-1.5 INJECTION, SOLUTION INTRAVENOUS CONTINUOUS
Status: DISCONTINUED | OUTPATIENT
Start: 2024-10-28 | End: 2024-10-29

## 2024-10-28 RX ORDER — SODIUM CHLORIDE 0.9 % (FLUSH) 0.9 %
5-40 SYRINGE (ML) INJECTION PRN
Status: DISCONTINUED | OUTPATIENT
Start: 2024-10-28 | End: 2024-10-28 | Stop reason: HOSPADM

## 2024-10-28 RX ORDER — SODIUM CHLORIDE 0.9 % (FLUSH) 0.9 %
5-40 SYRINGE (ML) INJECTION EVERY 12 HOURS SCHEDULED
Status: DISCONTINUED | OUTPATIENT
Start: 2024-10-28 | End: 2024-10-28 | Stop reason: HOSPADM

## 2024-10-28 RX ORDER — ONDANSETRON 4 MG/1
4 TABLET, ORALLY DISINTEGRATING ORAL EVERY 8 HOURS PRN
Status: DISCONTINUED | OUTPATIENT
Start: 2024-10-28 | End: 2024-10-30 | Stop reason: HOSPADM

## 2024-10-28 RX ORDER — FLUMAZENIL 0.1 MG/ML
INJECTION INTRAVENOUS
Status: COMPLETED
Start: 2024-10-28 | End: 2024-10-28

## 2024-10-28 RX ORDER — SODIUM CHLORIDE 0.9 % (FLUSH) 0.9 %
5-40 SYRINGE (ML) INJECTION EVERY 12 HOURS SCHEDULED
Status: DISCONTINUED | OUTPATIENT
Start: 2024-10-28 | End: 2024-10-30 | Stop reason: HOSPADM

## 2024-10-28 RX ORDER — ONDANSETRON 2 MG/ML
4 INJECTION INTRAMUSCULAR; INTRAVENOUS EVERY 6 HOURS PRN
Status: DISCONTINUED | OUTPATIENT
Start: 2024-10-28 | End: 2024-10-30 | Stop reason: HOSPADM

## 2024-10-28 RX ORDER — PROPOFOL 10 MG/ML
5-50 INJECTION, EMULSION INTRAVENOUS CONTINUOUS
Status: CANCELLED | OUTPATIENT
Start: 2024-10-28

## 2024-10-28 RX ORDER — FLUMAZENIL 0.1 MG/ML
INJECTION INTRAVENOUS
Status: DISCONTINUED | OUTPATIENT
Start: 2024-10-28 | End: 2024-10-28 | Stop reason: SDUPTHER

## 2024-10-28 RX ORDER — PROPOFOL 10 MG/ML
INJECTION, EMULSION INTRAVENOUS
Status: DISCONTINUED | OUTPATIENT
Start: 2024-10-28 | End: 2024-10-28 | Stop reason: SDUPTHER

## 2024-10-28 RX ORDER — NALOXONE HYDROCHLORIDE 0.4 MG/ML
INJECTION, SOLUTION INTRAMUSCULAR; INTRAVENOUS; SUBCUTANEOUS
Status: DISCONTINUED | OUTPATIENT
Start: 2024-10-28 | End: 2024-10-28 | Stop reason: SDUPTHER

## 2024-10-28 RX ORDER — PHENYLEPHRINE HCL IN 0.9% NACL 0.4MG/10ML
SYRINGE (ML) INTRAVENOUS
Status: DISCONTINUED | OUTPATIENT
Start: 2024-10-28 | End: 2024-10-28 | Stop reason: SDUPTHER

## 2024-10-28 RX ORDER — HYDRALAZINE HYDROCHLORIDE 20 MG/ML
20 INJECTION INTRAMUSCULAR; INTRAVENOUS ONCE
Status: COMPLETED | OUTPATIENT
Start: 2024-10-28 | End: 2024-10-28

## 2024-10-28 RX ORDER — PROCHLORPERAZINE EDISYLATE 5 MG/ML
5 INJECTION INTRAMUSCULAR; INTRAVENOUS
Status: DISCONTINUED | OUTPATIENT
Start: 2024-10-28 | End: 2024-10-28 | Stop reason: HOSPADM

## 2024-10-28 RX ORDER — SODIUM CHLORIDE 9 MG/ML
INJECTION, SOLUTION INTRAVENOUS CONTINUOUS
Status: DISCONTINUED | OUTPATIENT
Start: 2024-10-28 | End: 2024-10-29

## 2024-10-28 RX ORDER — PROPOFOL 10 MG/ML
5-50 INJECTION, EMULSION INTRAVENOUS CONTINUOUS
Status: DISCONTINUED | OUTPATIENT
Start: 2024-10-28 | End: 2024-10-29

## 2024-10-28 RX ORDER — CEFAZOLIN SODIUM 1 G/3ML
INJECTION, POWDER, FOR SOLUTION INTRAMUSCULAR; INTRAVENOUS
Status: DISCONTINUED | OUTPATIENT
Start: 2024-10-28 | End: 2024-10-28 | Stop reason: SDUPTHER

## 2024-10-28 RX ORDER — SODIUM CHLORIDE 0.9 % (FLUSH) 0.9 %
5-40 SYRINGE (ML) INJECTION PRN
Status: DISCONTINUED | OUTPATIENT
Start: 2024-10-28 | End: 2024-10-30 | Stop reason: HOSPADM

## 2024-10-28 RX ORDER — ROSUVASTATIN CALCIUM 20 MG/1
20 TABLET, COATED ORAL NIGHTLY
Status: DISCONTINUED | OUTPATIENT
Start: 2024-10-28 | End: 2024-10-30 | Stop reason: HOSPADM

## 2024-10-28 RX ORDER — SODIUM CHLORIDE 9 MG/ML
INJECTION, SOLUTION INTRAVENOUS PRN
Status: DISCONTINUED | OUTPATIENT
Start: 2024-10-28 | End: 2024-10-28 | Stop reason: HOSPADM

## 2024-10-28 RX ORDER — ONDANSETRON 2 MG/ML
INJECTION INTRAMUSCULAR; INTRAVENOUS
Status: DISCONTINUED | OUTPATIENT
Start: 2024-10-28 | End: 2024-10-28 | Stop reason: SDUPTHER

## 2024-10-28 RX ORDER — PROPOFOL 10 MG/ML
INJECTION, EMULSION INTRAVENOUS
Status: COMPLETED
Start: 2024-10-28 | End: 2024-10-28

## 2024-10-28 RX ORDER — SODIUM CHLORIDE, SODIUM LACTATE, POTASSIUM CHLORIDE, CALCIUM CHLORIDE 600; 310; 30; 20 MG/100ML; MG/100ML; MG/100ML; MG/100ML
INJECTION, SOLUTION INTRAVENOUS CONTINUOUS
Status: DISCONTINUED | OUTPATIENT
Start: 2024-10-28 | End: 2024-10-28 | Stop reason: HOSPADM

## 2024-10-28 RX ORDER — FENTANYL CITRATE 50 UG/ML
50 INJECTION, SOLUTION INTRAMUSCULAR; INTRAVENOUS EVERY 5 MIN PRN
Status: DISCONTINUED | OUTPATIENT
Start: 2024-10-28 | End: 2024-10-28 | Stop reason: HOSPADM

## 2024-10-28 RX ORDER — OXYCODONE HYDROCHLORIDE 5 MG/1
10 TABLET ORAL EVERY 4 HOURS PRN
Status: DISCONTINUED | OUTPATIENT
Start: 2024-10-28 | End: 2024-10-30 | Stop reason: HOSPADM

## 2024-10-28 RX ORDER — HYDROMORPHONE HYDROCHLORIDE 1 MG/ML
0.25 INJECTION, SOLUTION INTRAMUSCULAR; INTRAVENOUS; SUBCUTANEOUS EVERY 5 MIN PRN
Status: DISCONTINUED | OUTPATIENT
Start: 2024-10-28 | End: 2024-10-28 | Stop reason: HOSPADM

## 2024-10-28 RX ORDER — ISOSORBIDE DINITRATE 5 MG/1
5 TABLET ORAL 2 TIMES DAILY
Status: DISCONTINUED | OUTPATIENT
Start: 2024-10-28 | End: 2024-10-30 | Stop reason: HOSPADM

## 2024-10-28 RX ORDER — TRAZODONE HYDROCHLORIDE 50 MG/1
50 TABLET, FILM COATED ORAL NIGHTLY
Status: DISCONTINUED | OUTPATIENT
Start: 2024-10-28 | End: 2024-10-30 | Stop reason: HOSPADM

## 2024-10-28 RX ORDER — OXYCODONE HYDROCHLORIDE 5 MG/1
5 TABLET ORAL EVERY 4 HOURS PRN
Status: DISCONTINUED | OUTPATIENT
Start: 2024-10-28 | End: 2024-10-30 | Stop reason: HOSPADM

## 2024-10-28 RX ORDER — DEXAMETHASONE SODIUM PHOSPHATE 4 MG/ML
INJECTION, SOLUTION INTRA-ARTICULAR; INTRALESIONAL; INTRAMUSCULAR; INTRAVENOUS; SOFT TISSUE
Status: DISCONTINUED | OUTPATIENT
Start: 2024-10-28 | End: 2024-10-28 | Stop reason: SDUPTHER

## 2024-10-28 RX ORDER — LIDOCAINE HYDROCHLORIDE 20 MG/ML
INJECTION, SOLUTION EPIDURAL; INFILTRATION; INTRACAUDAL; PERINEURAL
Status: DISCONTINUED | OUTPATIENT
Start: 2024-10-28 | End: 2024-10-28 | Stop reason: SDUPTHER

## 2024-10-28 RX ORDER — PREGABALIN 150 MG/1
150 CAPSULE ORAL ONCE
Status: COMPLETED | OUTPATIENT
Start: 2024-10-28 | End: 2024-10-28

## 2024-10-28 RX ORDER — ESCITALOPRAM OXALATE 10 MG/1
10 TABLET ORAL DAILY
Status: DISCONTINUED | OUTPATIENT
Start: 2024-10-29 | End: 2024-10-30 | Stop reason: HOSPADM

## 2024-10-28 RX ORDER — ACETAMINOPHEN 500 MG
1000 TABLET ORAL ONCE
Status: COMPLETED | OUTPATIENT
Start: 2024-10-28 | End: 2024-10-28

## 2024-10-28 RX ORDER — EPHEDRINE SULFATE/0.9% NACL/PF 25 MG/5 ML
SYRINGE (ML) INTRAVENOUS
Status: DISCONTINUED | OUTPATIENT
Start: 2024-10-28 | End: 2024-10-28 | Stop reason: SDUPTHER

## 2024-10-28 RX ORDER — PANTOPRAZOLE SODIUM 40 MG/1
40 TABLET, DELAYED RELEASE ORAL 2 TIMES DAILY
Status: DISCONTINUED | OUTPATIENT
Start: 2024-10-28 | End: 2024-10-30 | Stop reason: HOSPADM

## 2024-10-28 RX ORDER — NALOXONE HYDROCHLORIDE 0.4 MG/ML
INJECTION, SOLUTION INTRAMUSCULAR; INTRAVENOUS; SUBCUTANEOUS PRN
Status: DISCONTINUED | OUTPATIENT
Start: 2024-10-28 | End: 2024-10-28 | Stop reason: HOSPADM

## 2024-10-28 RX ORDER — DIPHENHYDRAMINE HCL 25 MG
25 CAPSULE ORAL EVERY 6 HOURS PRN
Status: DISCONTINUED | OUTPATIENT
Start: 2024-10-28 | End: 2024-10-29

## 2024-10-28 RX ORDER — POLYETHYLENE GLYCOL 3350 17 G/17G
17 POWDER, FOR SOLUTION ORAL DAILY
Status: DISCONTINUED | OUTPATIENT
Start: 2024-10-29 | End: 2024-10-30 | Stop reason: HOSPADM

## 2024-10-28 RX ORDER — DIPHENHYDRAMINE HYDROCHLORIDE 50 MG/ML
25 INJECTION INTRAMUSCULAR; INTRAVENOUS EVERY 6 HOURS PRN
Status: DISCONTINUED | OUTPATIENT
Start: 2024-10-28 | End: 2024-10-29

## 2024-10-28 RX ORDER — PROPOFOL 10 MG/ML
INJECTION, EMULSION INTRAVENOUS
Status: DISCONTINUED
Start: 2024-10-28 | End: 2024-10-29

## 2024-10-28 RX ORDER — ROCURONIUM BROMIDE 10 MG/ML
INJECTION, SOLUTION INTRAVENOUS
Status: DISCONTINUED | OUTPATIENT
Start: 2024-10-28 | End: 2024-10-28 | Stop reason: SDUPTHER

## 2024-10-28 RX ORDER — ACETAMINOPHEN 325 MG/1
650 TABLET ORAL EVERY 6 HOURS
Status: DISCONTINUED | OUTPATIENT
Start: 2024-10-28 | End: 2024-10-30 | Stop reason: HOSPADM

## 2024-10-28 RX ORDER — FLUTICASONE PROPIONATE 50 MCG
2 SPRAY, SUSPENSION (ML) NASAL PRN
Status: DISCONTINUED | OUTPATIENT
Start: 2024-10-28 | End: 2024-10-30 | Stop reason: HOSPADM

## 2024-10-28 RX ORDER — SENNA AND DOCUSATE SODIUM 50; 8.6 MG/1; MG/1
1 TABLET, FILM COATED ORAL 2 TIMES DAILY
Status: DISCONTINUED | OUTPATIENT
Start: 2024-10-28 | End: 2024-10-30 | Stop reason: HOSPADM

## 2024-10-28 RX ADMIN — PROPOFOL 50 MCG/KG/MIN: 10 INJECTION, EMULSION INTRAVENOUS at 17:52

## 2024-10-28 RX ADMIN — NALXONE HYDROCHLORIDE 0.04 MG: 0.4 INJECTION INTRAMUSCULAR; INTRAVENOUS; SUBCUTANEOUS at 16:37

## 2024-10-28 RX ADMIN — HYDRALAZINE HYDROCHLORIDE 20 MG: 20 INJECTION INTRAMUSCULAR; INTRAVENOUS at 19:38

## 2024-10-28 RX ADMIN — PHENYLEPHRINE HYDROCHLORIDE 40 MCG/MIN: 10 INJECTION INTRAVENOUS at 14:29

## 2024-10-28 RX ADMIN — ACETAMINOPHEN 1000 MG: 500 TABLET ORAL at 12:55

## 2024-10-28 RX ADMIN — NALXONE HYDROCHLORIDE 0.2 MG: 0.4 INJECTION INTRAMUSCULAR; INTRAVENOUS; SUBCUTANEOUS at 17:10

## 2024-10-28 RX ADMIN — SUGAMMADEX 125 MG: 100 INJECTION, SOLUTION INTRAVENOUS at 16:07

## 2024-10-28 RX ADMIN — NALXONE HYDROCHLORIDE 0.04 MG: 0.4 INJECTION INTRAMUSCULAR; INTRAVENOUS; SUBCUTANEOUS at 16:34

## 2024-10-28 RX ADMIN — ROCURONIUM BROMIDE 10 MG: 10 INJECTION INTRAVENOUS at 15:45

## 2024-10-28 RX ADMIN — SODIUM CHLORIDE: 9 INJECTION, SOLUTION INTRAVENOUS at 22:09

## 2024-10-28 RX ADMIN — EPHEDRINE SULFATE 10 MG: 5 INJECTION INTRAVENOUS at 14:32

## 2024-10-28 RX ADMIN — WATER 2000 MG: 1 INJECTION INTRAMUSCULAR; INTRAVENOUS; SUBCUTANEOUS at 14:28

## 2024-10-28 RX ADMIN — ROCURONIUM BROMIDE 10 MG: 10 INJECTION INTRAVENOUS at 14:57

## 2024-10-28 RX ADMIN — SODIUM CHLORIDE, PRESERVATIVE FREE 10 ML: 5 INJECTION INTRAVENOUS at 22:09

## 2024-10-28 RX ADMIN — Medication 120 MCG: at 14:25

## 2024-10-28 RX ADMIN — FLUMAZENIL 0.3 MG: 0.1 INJECTION, SOLUTION INTRAVENOUS at 17:25

## 2024-10-28 RX ADMIN — NALXONE HYDROCHLORIDE 0.08 MG: 0.4 INJECTION INTRAMUSCULAR; INTRAVENOUS; SUBCUTANEOUS at 16:50

## 2024-10-28 RX ADMIN — PROPOFOL 100 MG: 10 INJECTION, EMULSION INTRAVENOUS at 14:20

## 2024-10-28 RX ADMIN — LIDOCAINE HYDROCHLORIDE 50 MG: 20 INJECTION, SOLUTION EPIDURAL; INFILTRATION; INTRACAUDAL; PERINEURAL at 14:20

## 2024-10-28 RX ADMIN — ROCURONIUM BROMIDE 10 MG: 10 INJECTION INTRAVENOUS at 15:15

## 2024-10-28 RX ADMIN — SUGAMMADEX 75 MG: 100 INJECTION, SOLUTION INTRAVENOUS at 16:32

## 2024-10-28 RX ADMIN — ROCURONIUM BROMIDE 30 MG: 10 INJECTION INTRAVENOUS at 14:21

## 2024-10-28 RX ADMIN — DEXMEDETOMIDINE HYDROCHLORIDE 0.2 MCG/KG/HR: 400 INJECTION, SOLUTION INTRAVENOUS at 23:16

## 2024-10-28 RX ADMIN — HYDROMORPHONE HYDROCHLORIDE 0.5 MG: 1 INJECTION, SOLUTION INTRAMUSCULAR; INTRAVENOUS; SUBCUTANEOUS at 15:15

## 2024-10-28 RX ADMIN — NALXONE HYDROCHLORIDE 0.04 MG: 0.4 INJECTION INTRAMUSCULAR; INTRAVENOUS; SUBCUTANEOUS at 16:32

## 2024-10-28 RX ADMIN — SODIUM CHLORIDE, POTASSIUM CHLORIDE, SODIUM LACTATE AND CALCIUM CHLORIDE: 600; 310; 30; 20 INJECTION, SOLUTION INTRAVENOUS at 15:27

## 2024-10-28 RX ADMIN — Medication 80 MCG: at 14:20

## 2024-10-28 RX ADMIN — Medication 3 AMPULE: at 12:56

## 2024-10-28 RX ADMIN — DEXAMETHASONE SODIUM PHOSPHATE 8 MG: 4 INJECTION INTRA-ARTICULAR; INTRALESIONAL; INTRAMUSCULAR; INTRAVENOUS; SOFT TISSUE at 14:52

## 2024-10-28 RX ADMIN — HYDROMORPHONE HYDROCHLORIDE 0.5 MG: 1 INJECTION, SOLUTION INTRAMUSCULAR; INTRAVENOUS; SUBCUTANEOUS at 14:18

## 2024-10-28 RX ADMIN — PROPOFOL 100 MG: 10 INJECTION, EMULSION INTRAVENOUS at 14:37

## 2024-10-28 RX ADMIN — PREGABALIN 150 MG: 150 CAPSULE ORAL at 12:55

## 2024-10-28 RX ADMIN — ONDANSETRON 4 MG: 2 INJECTION INTRAMUSCULAR; INTRAVENOUS at 16:03

## 2024-10-28 RX ADMIN — WATER 2000 MG: 1 INJECTION INTRAMUSCULAR; INTRAVENOUS; SUBCUTANEOUS at 22:07

## 2024-10-28 RX ADMIN — CEFAZOLIN 2 G: 1 INJECTION, POWDER, FOR SOLUTION INTRAMUSCULAR; INTRAVENOUS; PARENTERAL at 14:28

## 2024-10-28 RX ADMIN — SODIUM CHLORIDE, POTASSIUM CHLORIDE, SODIUM LACTATE AND CALCIUM CHLORIDE: 600; 310; 30; 20 INJECTION, SOLUTION INTRAVENOUS at 13:04

## 2024-10-28 ASSESSMENT — PAIN SCALES - GENERAL
PAINLEVEL_OUTOF10: 0

## 2024-10-28 ASSESSMENT — PULMONARY FUNCTION TESTS
PIF_VALUE: 13
PIF_VALUE: 17
PIF_VALUE: 20

## 2024-10-28 ASSESSMENT — LIFESTYLE VARIABLES: SMOKING_STATUS: 1

## 2024-10-28 ASSESSMENT — PAIN DESCRIPTION - DESCRIPTORS: DESCRIPTORS: SPASM

## 2024-10-28 ASSESSMENT — PAIN - FUNCTIONAL ASSESSMENT: PAIN_FUNCTIONAL_ASSESSMENT: 0-10

## 2024-10-28 NOTE — PROGRESS NOTES
I've remained in the PACU since nurse called to say she was not yet awake after surgery. Family tells me she is a very shallow breather , may have untreated sleep apnea, she was reported to be moving all extremities after she was re intubated and sent to CT scan , requiring propofol so she could complete the scan. No complications at surgery and I have looked at the CT brain and c spine just done and I see no obvious abnormalities  I agree she should go to the ICU , I have kept the family informed, suspect this is a respiratory problem, defer to ICU staff for medical management

## 2024-10-28 NOTE — ANESTHESIA POSTPROCEDURE EVALUATION
Department of Anesthesiology  Postprocedure Note    Patient: Hanane Eastman  MRN: [id number]  YOB: 1950  Date of evaluation: 10/28/2024    Procedure Summary       Date: 10/28/24 Room / Location: \[facility]\"" MAIN OR M8 / \[facility]\"" MAIN OR    Anesthesia Start: 1413 Anesthesia Stop: 1700    Procedure: C4-5 ANTERIOR CERVICAL DISCECTOMY WITH FUSION (Spine Cervical) Diagnosis:       Other cervical disc degeneration at C4-C5 level      Herniated disc, cervical      (Other cervical disc degeneration at C4-C5 level [M50.321])      (Herniated disc, cervical [M50.20])    Providers: Pancho Witt MD Responsible Provider: Jagjit Kruger MD    Anesthesia Type: general ASA Status: 2            Anesthesia Type: No value filed.    Abdifatah Phase I: Abdifatah Score: 4    Abdifatah Phase II:      Anesthesia Post Evaluation    Patient location during evaluation: PACU  Cardiovascular status: hemodynamically stable  Respiratory status: intubated  Comments: Called to PACU post op after pt met criteria for extubation but not responding to commands or stimulus in PACU.  She only received 1 mg the entire case of Dilaudid and thus we gave mutiple doses of Narcan without response.  Muscle relaxants were reveresed.  Pt maintained O2 sats with face mask (non rebreather).  She continued to become less arousable and an urgent ABG showed a resp acidosis and thus we intubated pt Membreno and neutral C-spine as pt had become narcotized from excessive CO2 and needed mechanical ventilation to correct the problem and so she would not decompensate for head and neck CT (per the code stroke that we called in the PACU before the ABG had resulted).  Pt to ICU for further vent management of her resp acidosis (likely due to her untreated JUANPABLO) .    No notable events documented.

## 2024-10-28 NOTE — SIGNIFICANT EVENT
RAPID RESPONSE TEAM NOTE:    1716-Writer responded to overhead page for Code Stroke in PACU. Patient is s/p cervical diskectomy and fusion. Pt. became obtunded and unable to follow commands after waking up in PACU.     Assessment:    Vital signs as follows: 160/76, 111HR, 11RR, and 95% on NRB.    Interventions:    Dr. Kruger and Dr. Witt at the bedside-ABG completed and STAT head/neck CTs obtained. TeleNeuro consulted at 1750. Ulises CELIS on screen and assessed patient at 1810-gave writer phone number to have Intensivist call provider to provider. Phone number provided to Dr. Whitaker.    Outcome:    Patient to admit to CCU 2507-transferred with R.T. and PACU staff ~1828. Phone nymber of Ulises CELIS given to Dr. Whitaker.    Please call with any questions or concerns  Adele Scott RN  RRT [phone]

## 2024-10-28 NOTE — PERIOP NOTE
1645- Pt arrived to PACU. Pt able to open eyes and, squeeze fists, and wiggle toes but extremely weak. No verbal response received from pt.    1655- Pt still very weak and not verbally responding. Dr. Kruger at bedside, narcan administered by A Wass CRNA.     1710- Dr. Kruger and A Wass CRNA called back to bedside. Pt unable to maintain sats without jaw thrust. Dr. Witt also to bedside.    1715- Code stroke called, more narcan given. ABG ordered.    1725- ABG complete    1734- ABG resulted, Dr. Kruger at bedside ready to reintubate.     1736- Pt reintubated at this time.     1739- Pt taken to CT with RT and RNx3.    1810- Tele neurologist assessed pt in CT room. Pt following all commands at this time.     1830- Bedside report given to ICU RN at this time. Pt alert and following all commands. Dr. Witt and Dr. Whitaker at bedside.

## 2024-10-28 NOTE — PROGRESS NOTES
1905- Bedside, Verbal, and Written shift change report given to Leia RN (oncoming nurse) by Vinh RN & Magdiel RN (offgoing nurse). Report included the following information SBAR, Kardex, OR Summary, MAR, Recent Results, and Med Rec Status.     2000- Shift assessment completed, see flowsheets.     0000- Reassessment completed, see flowsheets.     0158- AM labs drawn and sent.    0400- Reassessment completed, see flowsheets.     0500- Bladder scanned patient at this time, showing 905mL. See flowsheets.     0530- Straight cathed patient at this time, output of 1025mL See flowsheets.     0540- Patient extubated to 4LNC at this time.    0600- Bedside swallow eval completed, and passed at this time. See flowsheets.     0700- Bedside and Verbal shift change report given to Magdiel RN (oncoming nurse) by Leia RN (offgoing nurse). Report included the following information SBAR, Kardex, Procedure Summary, Intake/Output, MAR, Med Rec Status, and Cardiac Rhythm NSR .       Leia Tinsley, RN

## 2024-10-28 NOTE — PROGRESS NOTES
Called to PACU post op after pt met criteria for extubation but not responding to commands or stimulus in PACU.  She only received 1 mg the entire case of Dilaudid and thus we gave mutiple doses of Narcan without response.  Muscle relaxants were reveresed.  Pt maintained O2 sats with face mask (non rebreather).  She continued to become less arousable and an urgent ABG showed a resp acidosis and thus we intubated pt Membreno and neutral C-spine as pt had become narcotized from excessive CO2 and needed mechanical ventilation to correct the problem and so she would not decompensate for head and neck CT (per the code stroke that we called in the PACU before the ABG had resulted).  Pt to ICU for further vent management of her resp acidosis (likely due to her untreated JUANPABLO) .

## 2024-10-28 NOTE — PROGRESS NOTES
1830: Patient received on floor from PACU, report received from ARMANDO Chino. CHG bath given, gown and electrodes changed. Sacrum has blanchable erythema, assessed with Magdiel BEY RN. Pt is able to follow commands w/ normal neurovascular assessment x4 extremities. ABG ordered w/ plan to extubate. X3 20g IV's noted. X1 R wrist, x1 LAC, x 1 LFA.

## 2024-10-28 NOTE — ANESTHESIA PRE PROCEDURE
BUNIONECTOMY Bilateral    • CATARACT EXTRACTION W/ INTRAOCULAR LENS  IMPLANT, BILATERAL     • CHOLECYSTECTOMY     • COLONOSCOPY  , 14    colon polyps seen on both studies, repeat every 5 years (Dr. Curry)   • ESOPHAGEAL MOTILITY STUDY N/A 2024    ESOPHAGEAL MOTILITY STUDY performed by Jyotsna Andre DO at [facility] ENDOSCOPY   • FOOT NEUROMA SURGERY Left    • HIP ARTHROPLASTY Right    • MOHS SURGERY      BCC on nose   • TONSILLECTOMY     • TUBAL LIGATION     • UPPER GASTROINTESTINAL ENDOSCOPY N/A 2024    ESOPHAGOGASTRODUODENOSCOPY performed by Jyotsna Andre DO at [facility] ENDOSCOPY       Social History:    Social History     Tobacco Use   • Smoking status: Every Day     Current packs/day: 0.00     Average packs/day: 1 pack/day for 30.0 years (30.0 ttl pk-yrs)     Types: Cigarettes     Start date: 10/3/1986     Last attempt to quit: 10/3/2016     Years since quittin.0   • Smokeless tobacco: Never   Substance Use Topics   • Alcohol use: Yes     Alcohol/week: 14.0 standard drinks of alcohol     Types: 14 Glasses of wine per week                                Ready to quit: Not Answered  Counseling given: Not Answered      Vital Signs (Current): There were no vitals filed for this visit.                                           BP Readings from Last 3 Encounters:   10/15/24 132/68   24 125/80   24 127/63       NPO Status:                                                                                 BMI:   Wt Readings from Last 3 Encounters:   10/15/24 53.2 kg (117 lb 4.6 oz)   24 54.4 kg (120 lb)   24 55.4 kg (122 lb 2 oz)     There is no height or weight on file to calculate BMI.    CBC:   Lab Results   Component Value Date/Time    WBC 14.3 10/15/2024 02:50 PM    RBC 5.19 10/15/2024 02:50 PM    HGB 16.0 10/15/2024 02:50 PM    HCT 48.9 10/15/2024 02:50 PM    MCV 94.2 10/15/2024 02:50 PM    RDW 12.9 10/15/2024 02:50 PM     10/15/2024 02:50 PM       CMP:

## 2024-10-28 NOTE — CONSULTS
Pulmonary and Critical Care  Consult Note    Requesting Provider: Dr. Witt    Reason for Consult: Acute hypercapnic respiratory failure    HPI: The patient is a 74/F who we are seeing in consultation at the request of Dr. Witt for acute hypercapnic respiratory failure.     Briefly, patient had anterior cervical diskectomy and fusion C4-5 with biomechanical implant graft and anterior locking plate by neurosurgery today. In PACU patient became obtunded, ABG revealed pH 6.9 with pCO2 >150. Patient was intubated and transferred to the ICU. On the way to ICU patient had CT head and neck which is not read yet.     Patient on arrival, is alert, awake and follows commands. She is still intubated and on mechanical ventilation.     Past Medical History:  Past Medical History:   Diagnosis Date    Anxiety     At risk for sleep apnea 10/15/2024    JUANPABLO 4    Basal cell carcinoma of skin     Colon polyps     GERD (gastroesophageal reflux disease)     Heart murmur     Dr. Back    Hyperlipidemia LDL goal < 100     Hypertension     Osteopenia        Social History:   reports that she has been smoking cigarettes. She started smoking about 38 years ago. She has a 30 pack-year smoking history. She has never used smokeless tobacco. She reports current alcohol use of about 14.0 standard drinks of alcohol per week. She reports that she does not use drugs.    Family History:  Family History   Problem Relation Age of Onset    Hypertension Mother     Heart Attack Mother         65    Heart Disease Mother     Osteoarthritis Mother     Osteoporosis Mother     Hypertension Father     Heart Disease Father     Stroke Father     Breast Cancer Sister 50    Thyroid Disease Sister     Alzheimer's Disease Brother         \"early onset\"    Hypertension Daughter        Allergies:  Allergies   Allergen Reactions    Ace Inhibitors Cough    Atorvastatin Other (See Comments)     Elevated CK    Adhesive Tape Rash       Medications:  Current  pCO2, Arterial >150 (H) 35 - 45 mmHg    pO2, Arterial 198 (H) 80 - 100 mmHg    O2 Method NONREBREATHER MASK      O2 Flow Rate 15.00 L/min    Source ARTERIAL      Site LEFT RADIAL      Jasvir Test YES      Critical Value Read Back Called to ERIBERTO Kruger MD on 10/28/2024 at 17:30              Imaging:    NC XR TECHNOLOGIST SERVICE    (Results Pending)   CT HEAD WO CONTRAST    (Results Pending)   CT CERVICAL SPINE WO CONTRAST    (Results Pending)           Assessment     The patient is a 74/F who we are seeing in consultation at the request of Dr. Witt for acute hypercapnic respiratory failure.    Acute hypercapnic respiratory failure - post procedure, likely due to sedation and anesthesia. She is now following commands and no focal deficit noted.     Plan  - CT head and CT neck not read yet - I have called radiology on call to get the read.   - Obtain ABG  - If ABG has normalized and CT head and neck are normal then I will proceed with extubation.   - Obtain chest x-ray and basic labs.     Full code.     CCM time - 40 minutes.     Mckinley Whitaker MD, SHAN, FCCP, FCCM, ATSF, FACP, DAABIP  Interventional Pulmonology/Critical Care Medicine  [facility] Critical [facility]

## 2024-10-28 NOTE — OP NOTE
[facility]              [address] [place]  [address]                            OPERATIVE REPORT      PATIENT NAME: ES DUNN                : 1950  MED REC NO: [id number]                       ROOM: OR  ACCOUNT NO: [id number]                       ADMIT DATE: 10/28/2024  PROVIDER: Pancho Witt MD    DATE OF SERVICE:  10/28/2024    PREOPERATIVE DIAGNOSES:  Cervical spondylosis with radiculopathy, C4-5.    POSTOPERATIVE DIAGNOSES:  Cervical spondylosis with radiculopathy, C4-5.    PROCEDURES PERFORMED:  One-level anterior cervical diskectomy and fusion C4-5 with biomechanical implant graft and anterior locking plate.    SURGEON:  Pancho Witt MD    ASSISTANT:  [facility].    ANESTHESIA:  General endotracheal.    ESTIMATED BLOOD LOSS:  Minimal.    SPECIMENS REMOVED:  ***    INTRAOPERATIVE FINDINGS:  See body of report.     COMPLICATIONS:  None.    IMPLANTS:  C-spine 6 mm high zero-profile graft with an anterior plate.    INDICATIONS:  Cervical radiculopathy.    DESCRIPTION OF PROCEDURE:  Review of imaging studies revealed cervical spondylosis with particularly right arm pain that did not improve with conservative measures at the C4-5 level.  After discussing the risks, benefits, and alternatives of surgery with her, she wished to proceed.  She was taken to the operating room where she was induced under general endotracheal anesthesia and placed on the operating table in the supine position.  A shoulder roll placed beneath both shoulders.  The anterior cervical region scrubbed, prepped, and draped in the usual sterile fashion.  A time-out was performed to identify the correct patient and procedure.  Appropriate antibiotics were administered and sequential stockings applied for DVT prophylaxis.  A small horizontal incision in a skin crease to the level of the thyroid cartilage was made on the patient's left side and an avascular plane was developed

## 2024-10-28 NOTE — PROGRESS NOTES
Spiritual Health History and Assessment/Progress Note  [facility]    Initial Encounter,  ,  ,      Name: Hanane Eastman MRN: [id number]    Age: 74 y.o.     Sex: female   Language: English   Congregation: [employer]az[employer]   <principal problem not specified>     Date: 10/28/2024            Total Time Calculated: 6 min              Spiritual Assessment began in [facility]        Referral/Consult From: Rounding   Encounter Overview/Reason: Initial Encounter  Service Provided For: Patient    Jeni, Belief, Meaning:   Patient identifies as spiritual, is connected with a jeni tradition or spiritual practice, and has beliefs or practices that help with coping during difficult times  Family/Friends No family/friends present      Importance and Influence:  Patient has spiritual/personal beliefs that influence decisions regarding their health  Family/Friends No family/friends present    Community:  Patient is connected with a spiritual community and feels well-supported. Support system includes: Spouse/Partner and Extended family  Family/Friends No family/friends present    Assessment and Plan of Care:     NOTE:   rounding in the OR.  provided a presence, encouragement, prayer and empathetic listening.     Spiritual Health Services are available 24 hours a day as requested.        Patient Interventions include: Facilitated expression of thoughts and feelings, Explored spiritual coping/struggle/distress, and Affirmed coping skills/support systems  Family/Friends Interventions include: No family/friends present    Patient Plan of Care: Spiritual Care available upon further referral  Family/Friends Plan of Care: Spiritual Care available upon further referral    Electronically signed by SORAYA Bunyd on 10/28/2024 at 12:46 PM     Rev. ARMANDO Bundy  [facility]   Paging Service [phone])

## 2024-10-29 PROBLEM — M50.821: Status: ACTIVE | Noted: 2024-10-29

## 2024-10-29 LAB
ANION GAP SERPL CALC-SCNC: 5 MMOL/L (ref 2–12)
ARTERIAL PATENCY WRIST A: ABNORMAL
ARTERIAL PATENCY WRIST A: YES
BASE DEFICIT BLDA-SCNC: 0.6 MMOL/L
BASE EXCESS BLDA CALC-SCNC: 2 MMOL/L
BASOPHILS # BLD: 0 K/UL (ref 0–0.1)
BASOPHILS NFR BLD: 0 % (ref 0–1)
BDY SITE: ABNORMAL
BDY SITE: ABNORMAL
BUN SERPL-MCNC: 8 MG/DL (ref 6–20)
BUN/CREAT SERPL: 21 (ref 12–20)
CALCIUM SERPL-MCNC: 8.6 MG/DL (ref 8.5–10.1)
CHLORIDE SERPL-SCNC: 108 MMOL/L (ref 97–108)
CO2 SERPL-SCNC: 25 MMOL/L (ref 21–32)
CREAT SERPL-MCNC: 0.38 MG/DL (ref 0.55–1.02)
DIFFERENTIAL METHOD BLD: ABNORMAL
EOSINOPHIL # BLD: 0 K/UL (ref 0–0.4)
EOSINOPHIL NFR BLD: 0 % (ref 0–7)
ERYTHROCYTE [DISTWIDTH] IN BLOOD BY AUTOMATED COUNT: 12.9 % (ref 11.5–14.5)
FIO2 ON VENT: 40 %
GLUCOSE SERPL-MCNC: 112 MG/DL (ref 65–100)
HCO3 BLDA-SCNC: 26 MMOL/L (ref 22–26)
HCO3 BLDA-SCNC: 28 MMOL/L (ref 22–26)
HCT VFR BLD AUTO: 37.8 % (ref 35–47)
HGB BLD-MCNC: 12.5 G/DL (ref 11.5–16)
IMM GRANULOCYTES # BLD AUTO: 0.1 K/UL (ref 0–0.04)
IMM GRANULOCYTES NFR BLD AUTO: 1 % (ref 0–0.5)
LYMPHOCYTES # BLD: 1.1 K/UL (ref 0.8–3.5)
LYMPHOCYTES NFR BLD: 9 % (ref 12–49)
MAGNESIUM SERPL-MCNC: 1.6 MG/DL (ref 1.6–2.4)
MCH RBC QN AUTO: 30.7 PG (ref 26–34)
MCHC RBC AUTO-ENTMCNC: 33.1 G/DL (ref 30–36.5)
MCV RBC AUTO: 92.9 FL (ref 80–99)
MONOCYTES # BLD: 0.5 K/UL (ref 0–1)
MONOCYTES NFR BLD: 4 % (ref 5–13)
NEUTS SEG # BLD: 10.7 K/UL (ref 1.8–8)
NEUTS SEG NFR BLD: 87 % (ref 32–75)
NRBC # BLD: 0 K/UL (ref 0–0.01)
NRBC BLD-RTO: 0 PER 100 WBC
PCO2 BLDA: 50 MMHG (ref 35–45)
PCO2 BLDA: 51 MMHG (ref 35–45)
PEEP RESPIRATORY: 5
PEEP RESPIRATORY: 5
PH BLDA: 7.33 (ref 7.35–7.45)
PH BLDA: 7.36 (ref 7.35–7.45)
PHOSPHATE SERPL-MCNC: 4.6 MG/DL (ref 2.6–4.7)
PLATELET # BLD AUTO: 208 K/UL (ref 150–400)
PMV BLD AUTO: 9.3 FL (ref 8.9–12.9)
PO2 BLDA: 71 MMHG (ref 80–100)
PO2 BLDA: 99 MMHG (ref 80–100)
POTASSIUM SERPL-SCNC: 3.7 MMOL/L (ref 3.5–5.1)
PRESSURE SUPPORT SETTING VENT: 5
PRESSURE SUPPORT SETTING VENT: 5
RBC # BLD AUTO: 4.07 M/UL (ref 3.8–5.2)
SAO2 % BLD: 93 % (ref 92–97)
SAO2 % BLD: 97 % (ref 92–97)
SAO2% DEVICE SAO2% SENSOR NAME: ABNORMAL
SAO2% DEVICE SAO2% SENSOR NAME: ABNORMAL
SODIUM SERPL-SCNC: 138 MMOL/L (ref 136–145)
SPECIMEN SITE: ABNORMAL
SPECIMEN SITE: ABNORMAL
VENTILATION MODE VENT: ABNORMAL
VENTILATION MODE VENT: ABNORMAL
WBC # BLD AUTO: 12.4 K/UL (ref 3.6–11)

## 2024-10-29 PROCEDURE — 6360000002 HC RX W HCPCS

## 2024-10-29 PROCEDURE — 5A1935Z RESPIRATORY VENTILATION, LESS THAN 24 CONSECUTIVE HOURS: ICD-10-PCS | Performed by: NEUROLOGICAL SURGERY

## 2024-10-29 PROCEDURE — 51701 INSERT BLADDER CATHETER: CPT

## 2024-10-29 PROCEDURE — 0BH17EZ INSERTION OF ENDOTRACHEAL AIRWAY INTO TRACHEA, VIA NATURAL OR ARTIFICIAL OPENING: ICD-10-PCS | Performed by: NURSE PRACTITIONER

## 2024-10-29 PROCEDURE — 83735 ASSAY OF MAGNESIUM: CPT

## 2024-10-29 PROCEDURE — 97116 GAIT TRAINING THERAPY: CPT

## 2024-10-29 PROCEDURE — 97530 THERAPEUTIC ACTIVITIES: CPT

## 2024-10-29 PROCEDURE — 84100 ASSAY OF PHOSPHORUS: CPT

## 2024-10-29 PROCEDURE — 97165 OT EVAL LOW COMPLEX 30 MIN: CPT

## 2024-10-29 PROCEDURE — G0378 HOSPITAL OBSERVATION PER HR: HCPCS

## 2024-10-29 PROCEDURE — 97161 PT EVAL LOW COMPLEX 20 MIN: CPT

## 2024-10-29 PROCEDURE — 6360000002 HC RX W HCPCS: Performed by: NEUROLOGICAL SURGERY

## 2024-10-29 PROCEDURE — 6370000000 HC RX 637 (ALT 250 FOR IP): Performed by: NEUROLOGICAL SURGERY

## 2024-10-29 PROCEDURE — 96366 THER/PROPH/DIAG IV INF ADDON: CPT

## 2024-10-29 PROCEDURE — 96367 TX/PROPH/DG ADDL SEQ IV INF: CPT

## 2024-10-29 PROCEDURE — 2580000003 HC RX 258: Performed by: NEUROLOGICAL SURGERY

## 2024-10-29 PROCEDURE — 1100000000 HC RM PRIVATE

## 2024-10-29 PROCEDURE — 94003 VENT MGMT INPAT SUBQ DAY: CPT

## 2024-10-29 PROCEDURE — 97535 SELF CARE MNGMENT TRAINING: CPT

## 2024-10-29 PROCEDURE — 80048 BASIC METABOLIC PNL TOTAL CA: CPT

## 2024-10-29 PROCEDURE — 96365 THER/PROPH/DIAG IV INF INIT: CPT

## 2024-10-29 PROCEDURE — 85025 COMPLETE CBC W/AUTO DIFF WBC: CPT

## 2024-10-29 PROCEDURE — 36415 COLL VENOUS BLD VENIPUNCTURE: CPT

## 2024-10-29 PROCEDURE — 51798 US URINE CAPACITY MEASURE: CPT

## 2024-10-29 PROCEDURE — 36600 WITHDRAWAL OF ARTERIAL BLOOD: CPT

## 2024-10-29 RX ORDER — MAGNESIUM SULFATE IN WATER 40 MG/ML
2000 INJECTION, SOLUTION INTRAVENOUS ONCE
Status: COMPLETED | OUTPATIENT
Start: 2024-10-29 | End: 2024-10-29

## 2024-10-29 RX ORDER — MAGNESIUM SULFATE IN WATER 40 MG/ML
INJECTION, SOLUTION INTRAVENOUS
Status: COMPLETED
Start: 2024-10-29 | End: 2024-10-29

## 2024-10-29 RX ADMIN — SODIUM CHLORIDE, PRESERVATIVE FREE 10 ML: 5 INJECTION INTRAVENOUS at 08:39

## 2024-10-29 RX ADMIN — Medication 1 AMPULE: at 08:37

## 2024-10-29 RX ADMIN — TRAZODONE HYDROCHLORIDE 50 MG: 50 TABLET ORAL at 21:24

## 2024-10-29 RX ADMIN — MAGNESIUM SULFATE HEPTAHYDRATE 2000 MG: 40 INJECTION, SOLUTION INTRAVENOUS at 05:53

## 2024-10-29 RX ADMIN — ESCITALOPRAM OXALATE 10 MG: 10 TABLET ORAL at 08:37

## 2024-10-29 RX ADMIN — ACETAMINOPHEN 650 MG: 325 TABLET ORAL at 21:25

## 2024-10-29 RX ADMIN — ACETAMINOPHEN 650 MG: 325 TABLET ORAL at 08:37

## 2024-10-29 RX ADMIN — SENNOSIDES AND DOCUSATE SODIUM 1 TABLET: 50; 8.6 TABLET ORAL at 08:36

## 2024-10-29 RX ADMIN — SODIUM CHLORIDE, PRESERVATIVE FREE 10 ML: 5 INJECTION INTRAVENOUS at 21:25

## 2024-10-29 RX ADMIN — ISOSORBIDE DINITRATE 5 MG: 5 TABLET ORAL at 21:25

## 2024-10-29 RX ADMIN — PANTOPRAZOLE SODIUM 40 MG: 40 TABLET, DELAYED RELEASE ORAL at 21:25

## 2024-10-29 RX ADMIN — SENNOSIDES AND DOCUSATE SODIUM 1 TABLET: 50; 8.6 TABLET ORAL at 21:24

## 2024-10-29 RX ADMIN — Medication 1 AMPULE: at 21:24

## 2024-10-29 RX ADMIN — POLYETHYLENE GLYCOL 3350 17 G: 17 POWDER, FOR SOLUTION ORAL at 08:40

## 2024-10-29 RX ADMIN — MAGNESIUM SULFATE IN WATER 2000 MG: 40 INJECTION, SOLUTION INTRAVENOUS at 05:53

## 2024-10-29 RX ADMIN — WATER 2000 MG: 1 INJECTION INTRAMUSCULAR; INTRAVENOUS; SUBCUTANEOUS at 05:51

## 2024-10-29 RX ADMIN — ISOSORBIDE DINITRATE 5 MG: 5 TABLET ORAL at 08:36

## 2024-10-29 RX ADMIN — PANTOPRAZOLE SODIUM 40 MG: 40 TABLET, DELAYED RELEASE ORAL at 08:37

## 2024-10-29 RX ADMIN — ACETAMINOPHEN 650 MG: 325 TABLET ORAL at 15:41

## 2024-10-29 RX ADMIN — ROSUVASTATIN CALCIUM 20 MG: 20 TABLET, FILM COATED ORAL at 21:25

## 2024-10-29 ASSESSMENT — PAIN SCALES - GENERAL: PAINLEVEL_OUTOF10: 0

## 2024-10-29 ASSESSMENT — PULMONARY FUNCTION TESTS
PIF_VALUE: 11
PIF_VALUE: 20

## 2024-10-29 NOTE — PROGRESS NOTES
SUBJ:  Tolerating extubation. NAD. Cognition intact.     OBJ:  Vitals:    10/29/24 0600 10/29/24 0800 10/29/24 0836 10/29/24 0900   BP: 120/68 (!) 140/87 (!) 146/76 (!) 121/59   Pulse: 89 83  87   Resp: 28 15  30   Temp:  98.8 °F (37.1 °C)     TempSrc:  Oral     SpO2: 93%   (!) 88%   Weight:       Height:         Physical Exam:  GEN: NAD  HEENT: NCAT, sclerae white  NECK: No JVD noted  CHEST: Clear to auscultation  CARDIAC: sinus rhythm, regular, no murmur noted  ABD: Soft, NT, +BS  EXT: Warm, no edema, normal capillary refill  NEURO: Cranial nerves intact, symmetric strength, no focal deficits noted  DERM: No lesions noted    I have reviewed the lab results from this day. Relevant abnormalities are discussed in the impression and plan    CXR: 10/28 NAD    IMPRESSION:  S/P cervical diskectomy and fusion C4-5  Failed extubation in PACU - likely prolonged anesthetic effect  Successful extubation in ICU this AM    PLAN:   Discussed with Dr Witt  Transfer out of ICU to Ortho floor  Post op mgmt per Dr Eduar [facility] will sign off. Please call if we can be of further assistance     Carlos A Jean-Baptiste MD  [facility]  [facility] 4th floor [facility] phone: [phone]  [facility] 7th floor [facility] phone: [phone]  [facility] phone: [phone]  10/29/2024

## 2024-10-29 NOTE — INTERDISCIPLINARY ROUNDS
Interdisciplinary team rounds were held 10/29/2024 with the following team members: Physician, Nursing, Dietitian, Pharmacist, , , and the CCU Charge RN.    Plan of care discussed. See clinical pathway and/or care plan for interventions and desired outcomes.    Goals of the Day: Transfer out of CCU.

## 2024-10-29 NOTE — PROGRESS NOTES
Ortho / Neurosurgery spine progress Note    POD# 1  s/p C4-5 ANTERIOR CERVICAL DISCECTOMY WITH FUSION   Pt seen with family at bedside. She was extubated this morning and also stanley removed. She is due to void. She is awake and alert, answering all questions appropriately. Denies pain, denies numbness or tingling in any extremity. Tolerating diet. Vitals and labs stable.    Patient in bed    VSS Afebrile.    Visit Vitals  BP (!) 146/76   Pulse 83   Temp 98.8 °F (37.1 °C) (Oral)   Resp 15   Ht 1.651 m (5' 5\")   Wt 51.9 kg (114 lb 6.7 oz)   SpO2 93%   BMI 19.04 kg/m²       Voiding status: pending, stanley removed this am.           Labs    Lab Results   Component Value Date/Time    HGB 12.5 10/29/2024 01:58 AM      Lab Results   Component Value Date/Time    INR 1.0 10/15/2024 02:50 PM      Lab Results   Component Value Date/Time     10/29/2024 01:58 AM    K 3.7 10/29/2024 01:58 AM     10/29/2024 01:58 AM    CO2 25 10/29/2024 01:58 AM    BUN 8 10/29/2024 01:58 AM     Recent Glucose Results:   Glucose   Date Value Ref Range Status   10/29/2024 112 (H) 65 - 100 mg/dL Final   10/15/2024 132 (H) 65 - 100 mg/dL Final   09/26/2024 97 65 - 100 mg/dL Final           Body mass index is 19.04 kg/m². : A BMI > 30 is classified as obesity and > 40 is classified as morbid obesity.     Awake and alert. No acute distress.    Dressing: Prineo C.D.I.   No significant erythema or swelling  Cryotherapy in place over incision.   BLE sensation to light touch intact  BLE motor intact. Strength 5/5    SCD for mechanical DVT proph while in bed        PLAN:  1) PT BID -  Ambulate as tolerated. 5lbs weight restrictions BUE.   2) Neurovascular Checks Every 4 hours   3) Pain control - scheduled tylenol  and prn  oxycodone    4) Readiness for discharge:     [x] Vital Signs stable    [x] Labs stable    [] + Voiding    [x] Wound intact, drainage minimal    [x] Tolerating PO intake     [] Cleared by PT (OT if applicable) for discharge   [x]

## 2024-10-29 NOTE — PLAN OF CARE
Problem: Safety - Medical Restraint  Goal: Remains free of injury from restraints (Restraint for Interference with Medical Device)  Description: INTERVENTIONS:  1. Determine that other, less restrictive measures have been tried or would not be effective before applying the restraint  2. Evaluate the patient's condition at the time of restraint application  3. Inform patient/family regarding the reason for restraint  4. Q2H: Monitor safety, psychosocial status, comfort, nutrition and hydration  10/29/2024 0543 by Leia Tinsley RN  Outcome: Completed  10/29/2024 0028 by Leia Tinsley, RN  Outcome: Progressing  Flowsheets (Taken 10/28/2024 2323)  Remains free of injury from restraints (restraint for interference with medical device): Determine that other, less restrictive measures have been tried or would not be effective before applying the restraint

## 2024-10-29 NOTE — PROGRESS NOTES
Doing well this AM  awake alert Ox3 THOMAS well with nno neck or arm pain  will transfer her to the spine/ortho floor and probably have pulmonary med see her to arrange for post hospitalization follow up . Likely she has an untreated undiagnosed COPD or similar

## 2024-10-29 NOTE — PROGRESS NOTES
Bedside and Verbal shift change report given to Magdiel BEY RN (oncoming nurse) by Leia GONSALVES RN (offgoing nurse). Report included the following information Nurse Handoff Report, ED Encounter Summary, ED SBAR, Adult Overview, Surgery Report, Intake/Output, MAR, Recent Results, Med Rec Status, and Cardiac Rhythm SR .   0800: Shift assessment complete, patient is alert and oriented, able to follow commands at this time. Pulses are palpable, skin is intact, patient is able to turn self. Patient is afebrile and denies pain, no on any gtt's at this time.  0912: Patient up to chair with PT/OT, standby recommended. Patient tolerated.   1019: IDR at this time, plan to transfer.  1245: Patient transferred to room 113 with RN.

## 2024-10-29 NOTE — PLAN OF CARE
Problem: Occupational Therapy - Adult  Goal: By Discharge: Performs self-care activities at highest level of function for planned discharge setting.  See evaluation for individualized goals.  Description: FUNCTIONAL STATUS PRIOR TO ADMISSION:  pt lives with family, was independent with ADLs and ILS PTA and did not use AD   , ADL Assistance: Independent,  ,  ,  ,  ,  ,  , Ambulation Assistance: Independent, Transfer Assistance: Independent,       HOME SUPPORT: Patient lived with family but didn't require assistance.    Occupational Therapy Goals:  Initiated 10/29/2024  1.  Patient will perform grooming standing at the sink with Matador within 7 day(s).  2.  Patient will perform bathing standing at the sink with Matador within 7 day(s).  3.  Patient will perform lower body dressing with Matador within 7 day(s).  4.  Patient will perform toilet transfers with Matador  within 7 day(s).  5.  Patient will perform all aspects of toileting with Matador within 7 day(s).  6.  Patient will participate in upper extremity therapeutic exercise/activities with Matador for 10 minutes within 7 day(s).    7.  Patient will utilize energy conservation techniques during functional activities with verbal cues within 7 day(s).    Outcome: Not Progressing   OCCUPATIONAL THERAPY EVALUATION    Patient: Hanane Eastman (74 y.o. female)  Date: 10/29/2024  Primary Diagnosis: Other cervical disc degeneration at C4-C5 level [M50.321]  Herniated disc, cervical [M50.20]  Cervical disc herniation [M50.20]  Procedure(s) (LRB):  C4-5 ANTERIOR CERVICAL DISCECTOMY WITH FUSION (N/A) 1 Day Post-Op     Precautions:                    ASSESSMENT :  The patient is limited by decreased functional mobility, independence in ADLs, activity tolerance, endurance, need for NC O2 .    Based on the impairments listed above pt was supine in bed, with family present in room.  She was SBA for bed mobility and able to don her socks sitting

## 2024-10-29 NOTE — PLAN OF CARE
lightheadedness with standing but VSS. Pt ambulated to toilet and bedside chair with CGA progressed to SBA, demos narrow SALUD and BUE held at trunk but otherwise no gait deviations, no LOB. Pt provided with incentive spirometer (achieved 750mL), demos productive cough. Anticipate pt will progress quickly after 1-2 more PT sessions. Pt left seated in bedside chair in NAD, all needs met.     DEFICITS/IMPAIRMENTS:   The patient is limited by decreased functional mobility, strength, endurance, balance     Based on the impairments listed above pt is near functional baseline.    Patient will benefit from skilled intervention to address the above impairments.    Functional Outcome Measure:  The patient scored 20/24 on the [facility] outcome measure which is indicative of higher odds of discharging home with home health or need of SNF/IPR.           PLAN :  Recommendations and Planned Interventions:   bed mobility training, transfer training, gait training, therapeutic exercises, neuromuscular re-education, patient and family training/education, and therapeutic activities    Frequency/Duration: Patient will be followed by physical therapy to address goals, PT Plan of Care: BID to address goals.        Recommendation for discharge: (in order for the patient to meet his/her long term goals):   Intermittent physical therapy up to 2-3x/week in previous living setting    Other factors to consider for discharge: no additional factors    IF patient discharges home will need the following DME: none                SUBJECTIVE:   Patient stated “I'm not in pain.”    OBJECTIVE DATA SUMMARY:       Past Medical History:   Diagnosis Date    Anxiety     At risk for sleep apnea 10/15/2024    JUANPABLO 4    Basal cell carcinoma of skin     Colon polyps     GERD (gastroesophageal reflux disease)     Heart murmur     Dr. Back    Hyperlipidemia LDL goal < 100     Hypertension     Osteopenia      Past Surgical History:   Procedure Laterality Date       6.  Climbing 3-5 steps with a railing? []  1 []  2 [x]  3  []  4     Raw Score: 20/24                            Cutoff score <=171,2,3 had higher odds of discharging home with home health or need of SNF/IPR.    1. Karis Arellano, Caitlyn Sanchez, Miguel Lee, Beatriz Juan, Isiah Cameron, Haile Arellano.  Validity of the AM-PAC “6-Clicks” Inpatient Daily Activity and Basic Mobility Short Forms. Physical Therapy Mar 2014, 94 (2) 379-391; DOI: 10.2522/ptj.92152913  2. Atul BLANCO, Kane J, Josy J, Rosalio J. Association of AM-PAC \"6-Clicks\" Basic Mobility and Daily Activity Scores With Discharge Destination. Phys Ther. 2021 4;101(4):ghxe532. doi: 10.1093/ptj/dfym012. PMID: 92863072.  3. Nasra WARD, Layla HOBBS, Marifer S, Fay K, Jovana S. Activity Measure for Post-Acute Care \"6-Clicks\" Basic Mobility Scores Predict Discharge Destination After Acute Care Hospitalization in [facility] Patient [facility]: A Retrospective, Observational Study. Arch Rehabil Res Clin Transl. 2022 16;4(3):362635. doi: 10.1016/j.arrct..184555. PMID: 54402243; PMCID: [id number].  4. Adan ESCOBEDO, Yennifer S, Miller W, Terri P. AM-PAC Short Forms Manual 4.0. Revised 2020.                                                                                                                                                                                                                              Pain Ratin/10   Pain Intervention(s):       Activity Tolerance:   Good and desaturates with activity and requires oxygen    After treatment:   Patient left in no apparent distress sitting up in chair, Call bell within reach, Bed/ chair alarm activated, Caregiver / family present, and Updated patient's board on functional status and mobility recommendations    COMMUNICATION/EDUCATION:   The patient's plan of care was discussed with: occupational therapist and registered nurse    Patient Education  Education Given To:

## 2024-10-29 NOTE — PROGRESS NOTES
Attended Interdisciplinary Rounds in the CCU where patient's plan of care was discussed.     Visited by: Chaplain Rob Connell M.Div., [facility].  Chaplain Finley Service: [phone])

## 2024-10-29 NOTE — PROGRESS NOTES
Chart reviewed  pt has been extubated  I noted before I left last evening she was moving all 4 extremities spontaneously  A code stroke had been called when she was slow to arouse post op but she shows no signs of a stroke or spinal problem  Condition was all respiratory related  AM bloood gas looked much better  will see her later this AM

## 2024-10-30 VITALS
HEART RATE: 110 BPM | DIASTOLIC BLOOD PRESSURE: 69 MMHG | OXYGEN SATURATION: 93 % | HEIGHT: 65 IN | TEMPERATURE: 98.1 F | BODY MASS INDEX: 19.06 KG/M2 | SYSTOLIC BLOOD PRESSURE: 122 MMHG | RESPIRATION RATE: 18 BRPM | WEIGHT: 114.42 LBS

## 2024-10-30 PROCEDURE — 94760 N-INVAS EAR/PLS OXIMETRY 1: CPT

## 2024-10-30 PROCEDURE — 94640 AIRWAY INHALATION TREATMENT: CPT

## 2024-10-30 PROCEDURE — 2700000000 HC OXYGEN THERAPY PER DAY

## 2024-10-30 PROCEDURE — 6370000000 HC RX 637 (ALT 250 FOR IP): Performed by: INTERNAL MEDICINE

## 2024-10-30 PROCEDURE — 97530 THERAPEUTIC ACTIVITIES: CPT

## 2024-10-30 PROCEDURE — 97535 SELF CARE MNGMENT TRAINING: CPT

## 2024-10-30 PROCEDURE — 6370000000 HC RX 637 (ALT 250 FOR IP): Performed by: NEUROLOGICAL SURGERY

## 2024-10-30 PROCEDURE — 97116 GAIT TRAINING THERAPY: CPT

## 2024-10-30 RX ORDER — SENNA AND DOCUSATE SODIUM 50; 8.6 MG/1; MG/1
1 TABLET, FILM COATED ORAL 2 TIMES DAILY
Qty: 20 TABLET | Refills: 0 | Status: SHIPPED | OUTPATIENT
Start: 2024-10-30 | End: 2024-11-07 | Stop reason: ALTCHOICE

## 2024-10-30 RX ORDER — IPRATROPIUM BROMIDE AND ALBUTEROL SULFATE 2.5; .5 MG/3ML; MG/3ML
1 SOLUTION RESPIRATORY (INHALATION)
Status: DISCONTINUED | OUTPATIENT
Start: 2024-10-30 | End: 2024-10-30 | Stop reason: HOSPADM

## 2024-10-30 RX ORDER — AZITHROMYCIN 250 MG/1
500 TABLET, FILM COATED ORAL DAILY
Status: DISCONTINUED | OUTPATIENT
Start: 2024-10-30 | End: 2024-10-30 | Stop reason: HOSPADM

## 2024-10-30 RX ORDER — AZITHROMYCIN 250 MG/1
TABLET, FILM COATED ORAL
Qty: 6 TABLET | Refills: 0 | Status: SHIPPED | OUTPATIENT
Start: 2024-10-30 | End: 2024-11-07 | Stop reason: ALTCHOICE

## 2024-10-30 RX ORDER — OXYCODONE HYDROCHLORIDE 5 MG/1
5 TABLET ORAL EVERY 4 HOURS PRN
Qty: 30 TABLET | Refills: 0 | Status: SHIPPED | OUTPATIENT
Start: 2024-10-30 | End: 2024-11-07 | Stop reason: ALTCHOICE

## 2024-10-30 RX ADMIN — ACETAMINOPHEN 650 MG: 325 TABLET ORAL at 04:27

## 2024-10-30 RX ADMIN — ESCITALOPRAM OXALATE 10 MG: 10 TABLET ORAL at 09:05

## 2024-10-30 RX ADMIN — ISOSORBIDE DINITRATE 5 MG: 5 TABLET ORAL at 09:05

## 2024-10-30 RX ADMIN — Medication 1 AMPULE: at 09:12

## 2024-10-30 RX ADMIN — PANTOPRAZOLE SODIUM 40 MG: 40 TABLET, DELAYED RELEASE ORAL at 09:06

## 2024-10-30 RX ADMIN — ACETAMINOPHEN 650 MG: 325 TABLET ORAL at 09:05

## 2024-10-30 RX ADMIN — IPRATROPIUM BROMIDE AND ALBUTEROL SULFATE 1 DOSE: .5; 3 SOLUTION RESPIRATORY (INHALATION) at 13:35

## 2024-10-30 RX ADMIN — SENNOSIDES AND DOCUSATE SODIUM 1 TABLET: 50; 8.6 TABLET ORAL at 09:05

## 2024-10-30 NOTE — PROGRESS NOTES
End of Shift Note    Bedside shift change report given to ARMANDO Daniel (oncoming nurse) by Vern Trevino RN (offgoing nurse).  Report included the following information SBAR, Kardex, ED Summary, OR Summary, Procedure Summary, Intake/Output, MAR, Recent Results, and Cardiac Rhythm sinus Tachy    Shift worked:  7pm-7am     Shift summary and any significant changes:     Pt A&O x4 on 3 litre O2  Ambulated to bathroom without any devices. Denies pain.   Vitals:    10/30/24 0430   BP: (!) 160/83   Pulse: (!) 111   Resp: 18   Temp: (!) 100.6 °F (38.1 °C)   SpO2: 92%    No SOB or any acute distress noticed at this time.       Concerns for physician to address: ....     Zone phone for oncoming shift:   ....       Activity:     Number times ambulated in hallways past shift: 3  Number of times OOB to chair past shift: 0    Cardiac:   Cardiac Monitoring: Yes           Access:  Current line(s): PIV     Genitourinary:   Urinary status: voiding    Respiratory:      Chronic home O2 use?:   Incentive spirometer at bedside: YES       GI:     Current diet:  ADULT DIET; Regular  ADULT ORAL NUTRITION SUPPLEMENT; Breakfast, Lunch, Dinner; Standard High Calorie/High Protein Oral Supplement  Passing flatus: YES  Tolerating current diet: YES       Pain Management:   Patient states pain is manageable on current regimen: YES    Skin:     Interventions: turn team, specialty bed, increase time out of bed, and nutritional support    Patient Safety:  Fall Score:    Interventions: bed/chair alarm, assistive device (walker, cane. etc), gripper socks, pt to call before getting OOB, and gait belt       Length of Stay:  Expected LOS: 5  Actual LOS: 1      Vern Trevino RN

## 2024-10-30 NOTE — PROGRESS NOTES
DISCHARGE NOTE FROM ORTHO NURSE    Patient determined to be stable for discharge by attending provider. I have reviewed the discharge instructions with the patient. They verbalized understanding and all questions were answered to their satisfaction. No complaints or further questions were expressed.      Medications sent to pharmacy. Appropriate educational materials and medication side effect teaching were provided.      PIV were removed prior to discharge.     Patient did not discharge with any line, stanley, or drain.    Personal items and valuables accounted for at discharge by patient and/or family: Yes    Post-op patient: Yes-Patient given post-op discharge kit and instructed on use.    Fredy Almaraz, RN

## 2024-10-30 NOTE — PROGRESS NOTES
I spoke with Dr Velazquez of Pulm Med earlier today and appreciate his input after he saw this patient  neurologically there are no acute problems and she may discharge when pulmonary issues are stable  she is more comfortable  continue post op care

## 2024-10-30 NOTE — PROGRESS NOTES
Ortho / Neurosurgery Progress Note    POD# 2  s/p C4-5 ANTERIOR CERVICAL DISCECTOMY WITH FUSION   Pt seen with daughter at bedside, she is awake and alert, reports no neck or upper extremity pain. She has been cleared from intensivist standpoint. She is a smoker and likely has underlying pulmonary condition. She is currently stable and off oxygen. She is voiding and tolerating dit. She did not clear PT due to oxygen desaturating to 88% on RA with tachycardia. Will likely discharge on home O2 until she sees pulmonology.    Patient in bed    VSS Afebrile.    Visit Vitals  BP (!) 145/85   Pulse (!) 112   Temp 98.1 °F (36.7 °C) (Oral)   Resp 18   Ht 1.651 m (5' 5\")   Wt 51.9 kg (114 lb 6.7 oz)   SpO2 90%   BMI 19.04 kg/m²               Labs    Lab Results   Component Value Date/Time    HGB 12.5 10/29/2024 01:58 AM      Lab Results   Component Value Date/Time    INR 1.0 10/15/2024 02:50 PM      Lab Results   Component Value Date/Time     10/29/2024 01:58 AM    K 3.7 10/29/2024 01:58 AM     10/29/2024 01:58 AM    CO2 25 10/29/2024 01:58 AM    BUN 8 10/29/2024 01:58 AM     Recent Glucose Results:   Glucose   Date Value Ref Range Status   10/29/2024 112 (H) 65 - 100 mg/dL Final   10/15/2024 132 (H) 65 - 100 mg/dL Final   09/26/2024 97 65 - 100 mg/dL Final           Body mass index is 19.04 kg/m². : A BMI > 30 is classified as obesity and > 40 is classified as morbid obesity.     Awake and alert. No acute distress.    Dressing: Prineo C.D.I.   No significant erythema or swelling  Cryotherapy in place over incision.   BLE sensation to light touch intact  BLE motor intact. Strength 5/5    SCD for mechanical DVT proph while in bed        PLAN:  1) PT BID - Ambulate as tolerated. 5lbs weight restrictions BUE.   2) Neurovascular Checks Every 4 hours   3) Pain control - scheduled tylenol  and prn  oxycodone    4) Readiness for discharge:     [x] Vital Signs stable    [x] Labs stable    [x] + Voiding    [x] Wound

## 2024-10-30 NOTE — PLAN OF CARE
Problem: Pain  Goal: Verbalizes/displays adequate comfort level or baseline comfort level  10/30/2024 0948 by Fredy Almaraz RN  Outcome: Progressing  10/29/2024 2239 by Vern Trevino RN  Outcome: Progressing     Problem: Safety - Adult  Goal: Free from fall injury  10/30/2024 0948 by Fredy Almaraz RN  Outcome: Progressing  10/29/2024 2239 by Vern Trevino RN  Outcome: Progressing  Flowsheets (Taken 10/29/2024 0847 by Magdiel Isbell RN)  Free From Fall Injury: Instruct family/caregiver on patient safety     Problem: Skin/Tissue Integrity  Goal: Absence of new skin breakdown  Description: 1.  Monitor for areas of redness and/or skin breakdown  2.  Assess vascular access sites hourly  3.  Every 4-6 hours minimum:  Change oxygen saturation probe site  4.  Every 4-6 hours:  If on nasal continuous positive airway pressure, respiratory therapy assess nares and determine need for appliance change or resting period.  10/30/2024 0948 by Fredy Almaraz RN  Outcome: Progressing  10/29/2024 2239 by Vern Trevino RN  Outcome: Progressing

## 2024-10-30 NOTE — CARE COORDINATION
Pt has been cleared for return home with no DME or skilled needs. O2 test performed, pt remained above 90 on room air both ambulating and at rest. CM met with pt and family at bedside to review pt's discharge plan; pt will go to daughter Elizabeth's house [facility] and will return to her own home in [place], [place] tomorrow.    Nkechi Rivers, [facility]  Care Management  [phone]

## 2024-10-30 NOTE — DISCHARGE SUMMARY
swelling. Wound appears to be healing without any evidence of infection. Neurovascular exam found to be within normal limits.     Physical Therapy started following surgery and participated in bed mobility, transfers and ambulation.            Discharged to: Home with .    Condition on Discharge:   Good    Discharge instructions:    - Take pain medications as prescribed  - Resume pre hospital diet      - Discharge activity: activity as tolerated  - Ambulate with assistive device as needed.  - Weight bearing status WBAT  - Follow up with pulmonary  - Wound Care Keep wound clean and dry.  See discharge instruction sheet.            -DISCHARGE MEDICATION LIST        Medication List        START taking these medications      azithromycin 250 MG tablet  Commonly known as: ZITHROMAX  500mg on day 1 followed by 250mg on days 2 - 5     oxyCODONE 5 MG immediate release tablet  Commonly known as: ROXICODONE  Take 1 tablet by mouth every 4 hours as needed for Pain for up to 7 days. Max Daily Amount: 30 mg     sennosides-docusate sodium 8.6-50 MG tablet  Commonly known as: SENOKOT-S  Take 1 tablet by mouth 2 times daily            CONTINUE taking these medications      escitalopram 10 MG tablet  Commonly known as: LEXAPRO  Take 1 tablet by mouth daily     hydrOXYzine HCl 25 MG tablet  Commonly known as: ATARAX  Take 1 tablet by mouth every 8 hours as needed for Anxiety     isosorbide dinitrate 5 MG tablet  Commonly known as: ISORDIL     pantoprazole 40 MG tablet  Commonly known as: PROTONIX  Take 1 tablet by mouth in the morning and at bedtime     rosuvastatin 20 MG tablet  Commonly known as: CRESTOR  TAKE 1 TABLET BY MOUTH EVERY DAY AT NIGHT     traZODone 50 MG tablet  Commonly known as: DESYREL  Take 1 tablet by mouth nightly            STOP taking these medications      fluticasone 50 MCG/ACT nasal spray  Commonly known as: FLONASE     MAGNESIUM PO     vitamin D 25 MCG (1000 UT) Caps               Where to Get Your  Medications        These medications were sent to [place], [place] - [address] - P [phone] - F [phone]  [address], [facility] [address]      Phone: [phone]   azithromycin 250 MG tablet  oxyCODONE 5 MG immediate release tablet  sennosides-docusate sodium 8.6-50 MG tablet      per medical continuation form      -Follow up in office in 2 weeks      Signed:  Susan Chaudhari PA-C, [facility]    Orthopaedic Physician Assistant    10/30/2024  2:15 PM

## 2024-10-30 NOTE — PLAN OF CARE
Problem: ABCDS Injury Assessment  Goal: Absence of physical injury  Outcome: Progressing  Flowsheets (Taken 10/29/2024 0847 by Magdiel Isbell RN)  Absence of Physical Injury: Implement safety measures based on patient assessment     Problem: Pain  Goal: Verbalizes/displays adequate comfort level or baseline comfort level  Outcome: Progressing     Problem: Discharge Planning  Goal: Discharge to home or other facility with appropriate resources  Outcome: Progressing     Problem: Safety - Adult  Goal: Free from fall injury  Outcome: Progressing  Flowsheets (Taken 10/29/2024 0847 by Magdiel Isbell RN)  Free From Fall Injury: Instruct family/caregiver on patient safety     Problem: Skin/Tissue Integrity  Goal: Absence of new skin breakdown  Description: 1.  Monitor for areas of redness and/or skin breakdown  2.  Assess vascular access sites hourly  3.  Every 4-6 hours minimum:  Change oxygen saturation probe site  4.  Every 4-6 hours:  If on nasal continuous positive airway pressure, respiratory therapy assess nares and determine need for appliance change or resting period.  Outcome: Progressing     Problem: Physical Therapy - Adult  Goal: By Discharge: Performs mobility at highest level of function for planned discharge setting.  See evaluation for individualized goals.  Description: FUNCTIONAL STATUS PRIOR TO ADMISSION: Patient was independent and active without use of DME.    HOME SUPPORT PRIOR TO ADMISSION: The patient lived with  but did not require assistance.    Physical Therapy Goals  Initiated 10/29/2024  1.  Patient will move from supine to sit and sit to supine in bed with independence within 4 day(s).    2.  Patient will perform sit to stand with modified independence within 4 day(s).  3.  Patient will transfer from bed to chair and chair to bed with modified independence using the least restrictive device within 4 day(s).  4.  Patient will ambulate with modified independence for 200 feet with the

## 2024-10-30 NOTE — PLAN OF CARE
Problem: Physical Therapy - Adult  Goal: By Discharge: Performs mobility at highest level of function for planned discharge setting.  See evaluation for individualized goals.  Description: FUNCTIONAL STATUS PRIOR TO ADMISSION: Patient was independent and active without use of DME.    HOME SUPPORT PRIOR TO ADMISSION: The patient lived with  but did not require assistance.    Physical Therapy Goals  Initiated 10/29/2024  1.  Patient will move from supine to sit and sit to supine in bed with independence within 4 day(s).    2.  Patient will perform sit to stand with modified independence within 4 day(s).  3.  Patient will transfer from bed to chair and chair to bed with modified independence using the least restrictive device within 4 day(s).  4.  Patient will ambulate with modified independence for 200 feet with the least restrictive device within 4 day(s).   5.  Patient will verbalize and demonstrate understanding of spinal precautions (No bending, lifting greater than 5 lbs, or twisting; log-roll technique; frequent repositioning as instructed) within 4 days.   10/30/2024 1413 by Marina Luther PTA  Outcome: Completed  10/30/2024 1258 by Marina Luther PTA  Outcome: Progressing   PHYSICAL THERAPY TREATMENT/DISCHARGE    Patient: Hanane Eastman (74 y.o. female)  Date: 10/30/2024  Diagnosis: Other cervical disc degeneration at C4-C5 level [M50.321]  Herniated disc, cervical [M50.20]  Cervical disc herniation [M50.20]  Other cervical disc disorders at C4-C5 level [M50.821] Cervical disc herniation  Procedure(s) (LRB):  C4-5 ANTERIOR CERVICAL DISCECTOMY WITH FUSION (N/A) 2 Days Post-Op  Precautions:                        ASSESSMENT:  Patient has been followed by skilled PT services and has progressed towards goals. Pt received semi fowlers in bed, agreeable to participate in therapy. Pt able to recall 2/3 back precautions with good adherence throughout mobility. Received on RA, saturating low 90s throughout  Niacinamide Counseling: I recommended taking niacin or niacinamide, also know as vitamin B3, twice daily. Recent evidence suggests that taking vitamin B3 (500 mg twice daily) can reduce the risk of actinic keratoses and non-melanoma skin cancers. Side effects of vitamin B3 include flushing and headache.

## 2024-10-30 NOTE — PLAN OF CARE
Problem: Occupational Therapy - Adult  Goal: By Discharge: Performs self-care activities at highest level of function for planned discharge setting.  See evaluation for individualized goals.  Description: FUNCTIONAL STATUS PRIOR TO ADMISSION:  pt lives with family, was independent with ADLs and ILS PTA and did not use AD   , ADL Assistance: Independent,  ,  ,  ,  ,  ,  , Ambulation Assistance: Independent, Transfer Assistance: Independent,       HOME SUPPORT: Patient lived with family but didn't require assistance.    Occupational Therapy Goals:  Initiated 10/29/2024  1.  Patient will perform grooming standing at the sink with [place] within 7 day(s).  2.  Patient will perform bathing standing at the sink with [place] within 7 day(s).  3.  Patient will perform lower body dressing with [place] within 7 day(s).  4.  Patient will perform toilet transfers with [place]  within 7 day(s).  5.  Patient will perform all aspects of toileting with [place] within 7 day(s).  6.  Patient will participate in upper extremity therapeutic exercise/activities with [place] for 10 minutes within 7 day(s).    7.  Patient will utilize energy conservation techniques during functional activities with verbal cues within 7 day(s).    Outcome: Progressing     OCCUPATIONAL THERAPY TREATMENT/DISCHARGE  Patient: Hanane Eastman (74 y.o. female)  Date: 10/30/2024  Primary Diagnosis: Other cervical disc degeneration at C4-C5 level [M50.321]  Herniated disc, cervical [M50.20]  Cervical disc herniation [M50.20]  Other cervical disc disorders at C4-C5 level [M50.821]  Procedure(s) (LRB):  C4-5 ANTERIOR CERVICAL DISCECTOMY WITH FUSION (N/A) 2 Days Post-Op   Precautions:                    Chart, occupational therapy assessment, plan of care, and goals were reviewed.    ASSESSMENT  Patient continues with skilled OT services and has progressed towards goals.  Pt received semi-lester in bed, fully dressed, motivated to work  within reach, Bed/ chair alarm activated, and Caregiver / family present    COMMUNICATION/EDUCATION:   The patient's plan of care was discussed with: physical therapy assistant and registered nurse         Thank you for this referral.  Paul August OT  Minutes: 23

## 2024-10-30 NOTE — CONSULTS
Pulmonary, Critical Care, and Sleep Medicine      Name: Hanane Eastman MRN: [id number]   : 1950 Hospital: [facility]   Date: 10/30/2024  Admission date: 10/28/2024 Hospital Day: 3   Patient PCP: Sarah Arguello APRN - NP    History:   Medical records and data reviewed. Pt seen in consultation    IMPRESSION:   Acute hypoxic respiratory insufficiency with Hypoxia  Probable COPD/ chronic bronchitis  Suspected Obstructive Sleep Apnea  Status post cervical discectomy and fusion of C4-C5  Chronic hypertension  GERD/reflux  Environmental allergies  Body mass index is 19.04 kg/m².      RECOMMENDATIONS/PLAN:   Will order nebulizer treatment for the patient   Repeat O2 challenge this afternoon, hopefully her oxygen will be better and then she can be discharged home.  Sputum for culture if available   Please send home with prescription for Z-Harrison   Follow-up as outpatient   prescription drug management with home med reconciliation reviewed  Thanks you for asking us to see pt while in the hospital     Interval history:         [x] High complexity decision making was performed  [x] See my orders for details      Initial HPI:      I was asked by Pancho Witt MD to see Hanane Eastman  a 74 y.o.   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  Patient had radiculopathy sensation but intact motor strength.    We were asked see the patient for evaluation of postop respiratory insufficiency.  Spoke with Dr. Witt.  Patient had uneventful surgery.  In the PACU patient was extubated and reintubated for

## 2024-10-30 NOTE — PLAN OF CARE
Problem: Physical Therapy - Adult  Goal: By Discharge: Performs mobility at highest level of function for planned discharge setting.  See evaluation for individualized goals.  Description: FUNCTIONAL STATUS PRIOR TO ADMISSION: Patient was independent and active without use of DME.    HOME SUPPORT PRIOR TO ADMISSION: The patient lived with  but did not require assistance.    Physical Therapy Goals  Initiated 10/29/2024  1.  Patient will move from supine to sit and sit to supine in bed with independence within 4 day(s).    2.  Patient will perform sit to stand with modified independence within 4 day(s).  3.  Patient will transfer from bed to chair and chair to bed with modified independence using the least restrictive device within 4 day(s).  4.  Patient will ambulate with modified independence for 200 feet with the least restrictive device within 4 day(s).   5.  Patient will verbalize and demonstrate understanding of spinal precautions (No bending, lifting greater than 5 lbs, or twisting; log-roll technique; frequent repositioning as instructed) within 4 days.   Outcome: Progressing   PHYSICAL THERAPY TREATMENT    Patient: Hanane Eastman (74 y.o. female)  Date: 10/30/2024  Diagnosis: Other cervical disc degeneration at C4-C5 level [M50.321]  Herniated disc, cervical [M50.20]  Cervical disc herniation [M50.20]  Other cervical disc disorders at C4-C5 level [M50.821] Cervical disc herniation  Procedure(s) (LRB):  C4-5 ANTERIOR CERVICAL DISCECTOMY WITH FUSION (N/A) 2 Days Post-Op  Precautions:                        ASSESSMENT:  Patient continues to benefit from skilled PT services and is progressing towards goals. Pt received semi fowlers in bed, agreeable to participate in therapy. Received on RA, saturating 88% at rest and desaturated to 86% with mobility, requiring addition of 1L O2. Pt able to recall 2/3 back precautions with good adherence throughout mobility. Only able to tolerate short distance around  room 2/2 symptomatic OH, desaturation, and tachycardia. Unable to progress with car transfer at this time 2/2 medical status. RN and PA notified. Will follow up this afternoon pending medical improvement. Encouraged use of incentive spirometer.     Patient is cleared for discharge from PT standpoint:  YES []     NO [x]        PLAN:  Patient continues to benefit from skilled intervention to address the above impairments.  Continue treatment per established plan of care.    Recommend with staff: therapy recommendations for staff: Recommend mobility with staff assist x1 using gait belt. and Recommend toileting using  recommended toilet device: the bathroom.      Recommendation for discharge: (in order for the patient to meet his/her long term goals):   Continue to assess pending progress    Other factors to consider for discharge: no additional factors    IF patient discharges home will need the following DME: none       SUBJECTIVE:   Patient stated, \"I am ready to go.\"    OBJECTIVE DATA SUMMARY:   Critical Behavior:  Orientation  Overall Orientation Status: Within Normal Limits  Orientation Level: Oriented X4  Cognition  Overall Cognitive Status: WNL    Functional Mobility Training:  Bed Mobility:  Bed Mobility Training  Bed Mobility Training: Yes  Interventions: Safety awareness training;Verbal cues  Supine to Sit: Independent  Scooting: Independent  Transfers:  Transfer Training  Transfer Training: Yes  Interventions: Safety awareness training;Verbal cues  Sit to Stand: Independent  Stand to Sit: Independent  Bed to Chair: Stand-by assistance  Balance:  Balance  Sitting: Intact  Standing: Intact   Ambulation/Gait Training:     Gait  Gait Training: Yes  Overall Level of Assistance: Stand-by assistance  Distance (ft): 20 Feet  Assistive Device: Gait belt  Interventions: Safety awareness training;Verbal cues  Base of Support: Narrowed  Speed/Nafisa: Accelerated  Step Length: Left shortened;Right shortened  Gait

## 2024-11-06 ENCOUNTER — HOSPITAL ENCOUNTER (INPATIENT)
Facility: HOSPITAL | Age: 74
LOS: 2 days | Discharge: HOME | DRG: 189 | End: 2024-11-10
Attending: EMERGENCY MEDICINE | Admitting: INTERNAL MEDICINE
Payer: MEDICARE

## 2024-11-06 ENCOUNTER — APPOINTMENT (OUTPATIENT)
Facility: HOSPITAL | Age: 74
DRG: 189 | End: 2024-11-06
Payer: MEDICARE

## 2024-11-06 DIAGNOSIS — J96.01 ACUTE RESPIRATORY FAILURE WITH HYPOXIA: Primary | ICD-10-CM

## 2024-11-06 DIAGNOSIS — J69.0 ASPIRATION PNEUMONIA, UNSPECIFIED ASPIRATION PNEUMONIA TYPE, UNSPECIFIED LATERALITY, UNSPECIFIED PART OF LUNG (HCC): ICD-10-CM

## 2024-11-06 DIAGNOSIS — J44.0 CHRONIC OBSTRUCTIVE PULMONARY DISEASE WITH ACUTE LOWER RESPIRATORY INFECTION (HCC): ICD-10-CM

## 2024-11-06 LAB
ALBUMIN SERPL-MCNC: 2.9 G/DL (ref 3.5–5)
ALBUMIN/GLOB SERPL: 0.9 (ref 1.1–2.2)
ALP SERPL-CCNC: 101 U/L (ref 45–117)
ALT SERPL-CCNC: 35 U/L (ref 12–78)
ANION GAP SERPL CALC-SCNC: 4 MMOL/L (ref 2–12)
AST SERPL-CCNC: 19 U/L (ref 15–37)
BASOPHILS # BLD: 0.1 K/UL (ref 0–0.1)
BASOPHILS NFR BLD: 0 % (ref 0–1)
BILIRUB SERPL-MCNC: 0.3 MG/DL (ref 0.2–1)
BUN SERPL-MCNC: 6 MG/DL (ref 6–20)
BUN/CREAT SERPL: 18 (ref 12–20)
CALCIUM SERPL-MCNC: 8.9 MG/DL (ref 8.5–10.1)
CHLORIDE SERPL-SCNC: 95 MMOL/L (ref 97–108)
CO2 SERPL-SCNC: 34 MMOL/L (ref 21–32)
CREAT SERPL-MCNC: 0.33 MG/DL (ref 0.55–1.02)
DIFFERENTIAL METHOD BLD: ABNORMAL
EOSINOPHIL # BLD: 0.1 K/UL (ref 0–0.4)
EOSINOPHIL NFR BLD: 1 % (ref 0–7)
ERYTHROCYTE [DISTWIDTH] IN BLOOD BY AUTOMATED COUNT: 12.1 % (ref 11.5–14.5)
GLOBULIN SER CALC-MCNC: 3.3 G/DL (ref 2–4)
GLUCOSE SERPL-MCNC: 120 MG/DL (ref 65–100)
HCT VFR BLD AUTO: 37.3 % (ref 35–47)
HGB BLD-MCNC: 12.7 G/DL (ref 11.5–16)
IMM GRANULOCYTES # BLD AUTO: 0.1 K/UL (ref 0–0.04)
IMM GRANULOCYTES NFR BLD AUTO: 1 % (ref 0–0.5)
LYMPHOCYTES # BLD: 3.1 K/UL (ref 0.8–3.5)
LYMPHOCYTES NFR BLD: 27 % (ref 12–49)
MCH RBC QN AUTO: 30.7 PG (ref 26–34)
MCHC RBC AUTO-ENTMCNC: 34 G/DL (ref 30–36.5)
MCV RBC AUTO: 90.1 FL (ref 80–99)
MONOCYTES # BLD: 1 K/UL (ref 0–1)
MONOCYTES NFR BLD: 9 % (ref 5–13)
NEUTS SEG # BLD: 6.9 K/UL (ref 1.8–8)
NEUTS SEG NFR BLD: 62 % (ref 32–75)
NRBC # BLD: 0 K/UL (ref 0–0.01)
NRBC BLD-RTO: 0 PER 100 WBC
NT PRO BNP: 830 PG/ML (ref 0–125)
PLATELET # BLD AUTO: 403 K/UL (ref 150–400)
PMV BLD AUTO: 8.6 FL (ref 8.9–12.9)
POTASSIUM SERPL-SCNC: 4.2 MMOL/L (ref 3.5–5.1)
PROT SERPL-MCNC: 6.2 G/DL (ref 6.4–8.2)
RBC # BLD AUTO: 4.14 M/UL (ref 3.8–5.2)
SODIUM SERPL-SCNC: 133 MMOL/L (ref 136–145)
TROPONIN I SERPL HS-MCNC: 6 NG/L (ref 0–51)
WBC # BLD AUTO: 11.1 K/UL (ref 3.6–11)

## 2024-11-06 PROCEDURE — 2580000003 HC RX 258: Performed by: INTERNAL MEDICINE

## 2024-11-06 PROCEDURE — 71275 CT ANGIOGRAPHY CHEST: CPT

## 2024-11-06 PROCEDURE — 96365 THER/PROPH/DIAG IV INF INIT: CPT

## 2024-11-06 PROCEDURE — 6370000000 HC RX 637 (ALT 250 FOR IP): Performed by: INTERNAL MEDICINE

## 2024-11-06 PROCEDURE — 93005 ELECTROCARDIOGRAM TRACING: CPT | Performed by: EMERGENCY MEDICINE

## 2024-11-06 PROCEDURE — 94640 AIRWAY INHALATION TREATMENT: CPT

## 2024-11-06 PROCEDURE — 85025 COMPLETE CBC W/AUTO DIFF WBC: CPT

## 2024-11-06 PROCEDURE — 6360000002 HC RX W HCPCS: Performed by: INTERNAL MEDICINE

## 2024-11-06 PROCEDURE — 6360000004 HC RX CONTRAST MEDICATION: Performed by: EMERGENCY MEDICINE

## 2024-11-06 PROCEDURE — 99285 EMERGENCY DEPT VISIT HI MDM: CPT

## 2024-11-06 PROCEDURE — 80053 COMPREHEN METABOLIC PANEL: CPT

## 2024-11-06 PROCEDURE — 83880 ASSAY OF NATRIURETIC PEPTIDE: CPT

## 2024-11-06 PROCEDURE — 36415 COLL VENOUS BLD VENIPUNCTURE: CPT

## 2024-11-06 PROCEDURE — G0378 HOSPITAL OBSERVATION PER HR: HCPCS

## 2024-11-06 PROCEDURE — 84484 ASSAY OF TROPONIN QUANT: CPT

## 2024-11-06 RX ORDER — POLYETHYLENE GLYCOL 3350 17 G/17G
17 POWDER, FOR SOLUTION ORAL DAILY PRN
Status: DISCONTINUED | OUTPATIENT
Start: 2024-11-06 | End: 2024-11-10 | Stop reason: HOSPADM

## 2024-11-06 RX ORDER — IPRATROPIUM BROMIDE AND ALBUTEROL SULFATE 2.5; .5 MG/3ML; MG/3ML
1 SOLUTION RESPIRATORY (INHALATION)
Status: DISCONTINUED | OUTPATIENT
Start: 2024-11-06 | End: 2024-11-10 | Stop reason: HOSPADM

## 2024-11-06 RX ORDER — PANTOPRAZOLE SODIUM 40 MG/1
40 TABLET, DELAYED RELEASE ORAL 2 TIMES DAILY
Status: DISCONTINUED | OUTPATIENT
Start: 2024-11-06 | End: 2024-11-10 | Stop reason: HOSPADM

## 2024-11-06 RX ORDER — IOPAMIDOL 755 MG/ML
100 INJECTION, SOLUTION INTRAVASCULAR ONCE
Status: COMPLETED | OUTPATIENT
Start: 2024-11-06 | End: 2024-11-06

## 2024-11-06 RX ORDER — TRAZODONE HYDROCHLORIDE 50 MG/1
50 TABLET, FILM COATED ORAL NIGHTLY
Status: DISCONTINUED | OUTPATIENT
Start: 2024-11-06 | End: 2024-11-08

## 2024-11-06 RX ORDER — SODIUM CHLORIDE 9 MG/ML
INJECTION, SOLUTION INTRAVENOUS PRN
Status: DISCONTINUED | OUTPATIENT
Start: 2024-11-06 | End: 2024-11-10 | Stop reason: HOSPADM

## 2024-11-06 RX ORDER — SODIUM CHLORIDE 0.9 % (FLUSH) 0.9 %
5-40 SYRINGE (ML) INJECTION PRN
Status: DISCONTINUED | OUTPATIENT
Start: 2024-11-06 | End: 2024-11-10 | Stop reason: HOSPADM

## 2024-11-06 RX ORDER — SENNA AND DOCUSATE SODIUM 50; 8.6 MG/1; MG/1
1 TABLET, FILM COATED ORAL 2 TIMES DAILY
Status: DISCONTINUED | OUTPATIENT
Start: 2024-11-06 | End: 2024-11-10 | Stop reason: HOSPADM

## 2024-11-06 RX ORDER — SODIUM CHLORIDE 0.9 % (FLUSH) 0.9 %
5-40 SYRINGE (ML) INJECTION EVERY 12 HOURS SCHEDULED
Status: DISCONTINUED | OUTPATIENT
Start: 2024-11-06 | End: 2024-11-10 | Stop reason: HOSPADM

## 2024-11-06 RX ORDER — ACETAMINOPHEN 325 MG/1
650 TABLET ORAL EVERY 6 HOURS PRN
Status: DISCONTINUED | OUTPATIENT
Start: 2024-11-06 | End: 2024-11-10 | Stop reason: HOSPADM

## 2024-11-06 RX ORDER — ESCITALOPRAM OXALATE 10 MG/1
10 TABLET ORAL DAILY
Status: DISCONTINUED | OUTPATIENT
Start: 2024-11-06 | End: 2024-11-10 | Stop reason: HOSPADM

## 2024-11-06 RX ORDER — ROSUVASTATIN CALCIUM 20 MG/1
20 TABLET, COATED ORAL NIGHTLY
Status: DISCONTINUED | OUTPATIENT
Start: 2024-11-06 | End: 2024-11-10 | Stop reason: HOSPADM

## 2024-11-06 RX ORDER — ENOXAPARIN SODIUM 100 MG/ML
40 INJECTION SUBCUTANEOUS DAILY
Status: DISCONTINUED | OUTPATIENT
Start: 2024-11-06 | End: 2024-11-10 | Stop reason: HOSPADM

## 2024-11-06 RX ORDER — ONDANSETRON 2 MG/ML
4 INJECTION INTRAMUSCULAR; INTRAVENOUS EVERY 6 HOURS PRN
Status: DISCONTINUED | OUTPATIENT
Start: 2024-11-06 | End: 2024-11-08

## 2024-11-06 RX ORDER — OXYCODONE HYDROCHLORIDE 5 MG/1
5 TABLET ORAL EVERY 4 HOURS PRN
Status: DISCONTINUED | OUTPATIENT
Start: 2024-11-06 | End: 2024-11-08

## 2024-11-06 RX ORDER — NICOTINE 21 MG/24HR
1 PATCH, TRANSDERMAL 24 HOURS TRANSDERMAL ONCE
Status: COMPLETED | OUTPATIENT
Start: 2024-11-06 | End: 2024-11-07

## 2024-11-06 RX ORDER — MAGNESIUM SULFATE IN WATER 40 MG/ML
2000 INJECTION, SOLUTION INTRAVENOUS PRN
Status: DISCONTINUED | OUTPATIENT
Start: 2024-11-06 | End: 2024-11-08

## 2024-11-06 RX ORDER — POTASSIUM CHLORIDE 7.45 MG/ML
10 INJECTION INTRAVENOUS PRN
Status: DISCONTINUED | OUTPATIENT
Start: 2024-11-06 | End: 2024-11-08

## 2024-11-06 RX ORDER — ONDANSETRON 4 MG/1
4 TABLET, ORALLY DISINTEGRATING ORAL EVERY 8 HOURS PRN
Status: DISCONTINUED | OUTPATIENT
Start: 2024-11-06 | End: 2024-11-08

## 2024-11-06 RX ORDER — ACETAMINOPHEN 650 MG/1
650 SUPPOSITORY RECTAL EVERY 6 HOURS PRN
Status: DISCONTINUED | OUTPATIENT
Start: 2024-11-06 | End: 2024-11-10 | Stop reason: HOSPADM

## 2024-11-06 RX ORDER — ISOSORBIDE DINITRATE 10 MG/1
5 TABLET ORAL 2 TIMES DAILY
Status: DISCONTINUED | OUTPATIENT
Start: 2024-11-06 | End: 2024-11-10 | Stop reason: HOSPADM

## 2024-11-06 RX ORDER — POTASSIUM CHLORIDE 750 MG/1
40 TABLET, EXTENDED RELEASE ORAL PRN
Status: DISCONTINUED | OUTPATIENT
Start: 2024-11-06 | End: 2024-11-08

## 2024-11-06 RX ADMIN — SODIUM CHLORIDE 3000 MG: 900 INJECTION INTRAVENOUS at 17:39

## 2024-11-06 RX ADMIN — ISOSORBIDE DINITRATE 5 MG: 10 TABLET ORAL at 23:51

## 2024-11-06 RX ADMIN — IPRATROPIUM BROMIDE AND ALBUTEROL SULFATE 1 DOSE: 2.5; .5 SOLUTION RESPIRATORY (INHALATION) at 19:13

## 2024-11-06 RX ADMIN — IOPAMIDOL 100 ML: 755 INJECTION, SOLUTION INTRAVENOUS at 13:23

## 2024-11-06 RX ADMIN — PANTOPRAZOLE SODIUM 40 MG: 40 TABLET, DELAYED RELEASE ORAL at 23:54

## 2024-11-06 RX ADMIN — SENNOSIDES AND DOCUSATE SODIUM 1 TABLET: 8.6; 5 TABLET ORAL at 23:52

## 2024-11-06 RX ADMIN — ENOXAPARIN SODIUM 40 MG: 100 INJECTION SUBCUTANEOUS at 23:54

## 2024-11-06 RX ADMIN — TRAZODONE HYDROCHLORIDE 50 MG: 50 TABLET ORAL at 23:52

## 2024-11-06 RX ADMIN — ROSUVASTATIN CALCIUM 20 MG: 20 TABLET, FILM COATED ORAL at 23:53

## 2024-11-06 ASSESSMENT — PAIN SCALES - GENERAL
PAINLEVEL_OUTOF10: 0

## 2024-11-06 ASSESSMENT — PAIN - FUNCTIONAL ASSESSMENT
PAIN_FUNCTIONAL_ASSESSMENT: 0-10
PAIN_FUNCTIONAL_ASSESSMENT: 0-10

## 2024-11-06 NOTE — H&P
and bone windows were generated. Coronal and sagittal reformats. CT dose reduction was achieved through use of a standardized protocol tailored for this examination and automatic exposure control for dose modulation.  FINDINGS: The ventricles and sulci are normal in size, shape and configuration. There is no significant white matter disease. There is no intracranial hemorrhage, extra-axial collection, or mass effect. The basilar cisterns are open. No CT evidence of acute infarct. The bone windows demonstrate no abnormalities. The visualized portions of the paranasal sinuses and mastoid air cells are clear.     No acute findings. INDICATION: Status post cervical discectomy. Postop obtundation and inability to follow commands. COMPARISON: Cervical spine MRI 9/17/2024 EXAM: Axial CT imaging of the cervical spine with multiplanar reformations. CT dose reduction was achieved through use of a standardized protocol tailored for this examination and automatic exposure control for dose modulation. FINDINGS: The patient is intubated. There is interval anterior cervical fixation with interbody grafting at C4-5. Normalization is normal. Vertebral body heights are normal. There is unchanged retrolisthesis at C4-5 measuring 2 mm. Alignment is otherwise anatomic Mild disc space narrowing at C3-4, C5-6 and C6-7 is unchanged. Multilevel facet osteoarthrosis is again shown bilaterally with ankylosis of the right C2-3 facet joint, moderate left C3-4 and mild right C5-6 osteoarthrosis. There is small foci of anterior epidural gas at C3. There is a mild to moderate appearance of osseous central canal stenosis at C4-5. Osseous foraminal narrowing is mild on the left at C3-4, moderate on the left and mild on the right at C4-5, and mild on the left at C5-6. No paraspinal mass is shown. Soft tissue gas is noted in the prevertebral and left cervical soft tissues on these postoperative images. Gas extends into the superior mediastinum on the

## 2024-11-06 NOTE — ED TRIAGE NOTES
Pt arrived with complaint of low oxygen.  Pt reports at home he oxygen was 87-90% and she has been feeling tired with low energy.  Pt reports she had neck surgery last week and they found some pulmonary concerns and she has an appointment on the 22 nd with a doctor in [place].  Pt is awake alert and oriented X 4, pt speaking in full complete sentences  NAD, pt ambulated to room 1 with a steady gait, no [place] noted.  Pt and  educated on ER flow

## 2024-11-06 NOTE — ED PROVIDER NOTES
concern for hypoxia.  Ambulatory pulse ox in the mid 80s.  She was placed on supplemental oxygen with improvement in saturations.  White blood cell count is normal.  Labs are otherwise unremarkable.  CTA of the chest shows no PE but does show mucous plugging and likely aspiration pneumonia.  Patient started on Zosyn.  Patient admitted to hospital medicine for further manage    Amount and/or Complexity of Data Reviewed  Labs: ordered.  Radiology: ordered.  ECG/medicine tests: ordered.    Risk  OTC drugs.  Prescription drug management.  Decision regarding hospitalization.         EKG: All EKG's are interpreted by the Emergency Department Physician who either signs or Co-signs this chart in the absence of a cardiologist.    ED Course as of 11/06/24 1552   Wed Nov 06, 2024   1225 EKG shows sinus rhythm at a rate of 81, normal intervals, left axis, no STEMI [RD]      ED Course User Index  [RD] Campos Gooden MD       CRITICAL CARE TIME   Total Critical Care time was 38 minutes, excluding separately reportable procedures.  There was a high probability of clinically significant/life threatening deterioration in the patient's condition which required my urgent intervention.     CONSULTS:  None    PROCEDURES:  Unless otherwise noted below, none     Procedures    FINAL IMPRESSION      1. Acute respiratory failure with hypoxia    2. Aspiration pneumonia, unspecified aspiration pneumonia type, unspecified laterality, unspecified part of lung (HCC)          DISPOSITION/PLAN   DISPOSITION Admitted 11/06/2024 03:11:06 PM    PATIENT REFERRED TO:  No follow-up provider specified.    DISCHARGE MEDICATIONS:  New Prescriptions    No medications on file     Controlled Substances Monitoring:          No data to display                (Please note that portions of this note were completed with a voice recognition program.  Efforts were made to edit the dictations but occasionally words are mis-transcribed.)    Campos Gooden MD

## 2024-11-06 NOTE — PROGRESS NOTES
Pharmacy Note - Renal Dosing    Ampicillin/sulbactam 3000 mg every 12 hours for Aspiration Pneumonia  adjusted to Ampicillin/sulbactam 3000 mg every 6 hours based on renal function.     Estimated Creatinine Clearance: Estimated Creatinine Clearance: 126 mL/min (A) (based on SCr of 0.33 mg/dL (L)).    BMI: Body mass index is 18.97 kg/m².    Rationale for Adjustment: Agent is renally eliminated.    Pharmacy will continue to monitor renal function and adjust dose as necessary.      Please call with any questions.    Thank you,    Mary Corado [facility]

## 2024-11-07 ENCOUNTER — APPOINTMENT (OUTPATIENT)
Facility: HOSPITAL | Age: 74
DRG: 189 | End: 2024-11-07
Payer: MEDICARE

## 2024-11-07 LAB
AMMONIA PLAS-SCNC: 20 UMOL/L
ANION GAP SERPL CALC-SCNC: 3 MMOL/L (ref 2–12)
ARTERIAL PATENCY WRIST A: YES
BASE EXCESS BLDA CALC-SCNC: 5.9 MMOL/L
BDY SITE: ABNORMAL
BUN SERPL-MCNC: 4 MG/DL (ref 6–20)
BUN/CREAT SERPL: 8 (ref 12–20)
CALCIUM SERPL-MCNC: 9.4 MG/DL (ref 8.5–10.1)
CHLORIDE SERPL-SCNC: 99 MMOL/L (ref 97–108)
CO2 SERPL-SCNC: 37 MMOL/L (ref 21–32)
CREAT SERPL-MCNC: 0.48 MG/DL (ref 0.55–1.02)
GAS FLOW.O2 O2 DELIVERY SYS: 2 L/MIN
GLUCOSE BLD STRIP.AUTO-MCNC: 177 MG/DL (ref 65–117)
GLUCOSE SERPL-MCNC: 144 MG/DL (ref 65–100)
HCO3 BLDA-SCNC: 33 MMOL/L (ref 22–26)
PCO2 BLDA: 58 MMHG (ref 35–45)
PH BLDA: 7.37 (ref 7.35–7.45)
PO2 BLDA: 83 MMHG (ref 80–100)
POTASSIUM SERPL-SCNC: 3.6 MMOL/L (ref 3.5–5.1)
SAO2 % BLD: 96 % (ref 92–97)
SAO2% DEVICE SAO2% SENSOR NAME: ABNORMAL
SERVICE CMNT-IMP: ABNORMAL
SODIUM SERPL-SCNC: 139 MMOL/L (ref 136–145)
SPECIMEN SITE: ABNORMAL

## 2024-11-07 PROCEDURE — 80048 BASIC METABOLIC PNL TOTAL CA: CPT

## 2024-11-07 PROCEDURE — 82803 BLOOD GASES ANY COMBINATION: CPT

## 2024-11-07 PROCEDURE — 94669 MECHANICAL CHEST WALL OSCILL: CPT

## 2024-11-07 PROCEDURE — 96372 THER/PROPH/DIAG INJ SC/IM: CPT

## 2024-11-07 PROCEDURE — 94640 AIRWAY INHALATION TREATMENT: CPT

## 2024-11-07 PROCEDURE — G0378 HOSPITAL OBSERVATION PER HR: HCPCS

## 2024-11-07 PROCEDURE — 6360000002 HC RX W HCPCS: Performed by: INTERNAL MEDICINE

## 2024-11-07 PROCEDURE — 72125 CT NECK SPINE W/O DYE: CPT

## 2024-11-07 PROCEDURE — 70450 CT HEAD/BRAIN W/O DYE: CPT

## 2024-11-07 PROCEDURE — 82140 ASSAY OF AMMONIA: CPT

## 2024-11-07 PROCEDURE — 81001 URINALYSIS AUTO W/SCOPE: CPT

## 2024-11-07 PROCEDURE — 2700000000 HC OXYGEN THERAPY PER DAY

## 2024-11-07 PROCEDURE — 2580000003 HC RX 258: Performed by: INTERNAL MEDICINE

## 2024-11-07 PROCEDURE — 6370000000 HC RX 637 (ALT 250 FOR IP): Performed by: INTERNAL MEDICINE

## 2024-11-07 PROCEDURE — 96366 THER/PROPH/DIAG IV INF ADDON: CPT

## 2024-11-07 PROCEDURE — 36600 WITHDRAWAL OF ARTERIAL BLOOD: CPT

## 2024-11-07 PROCEDURE — 82962 GLUCOSE BLOOD TEST: CPT

## 2024-11-07 PROCEDURE — 36415 COLL VENOUS BLD VENIPUNCTURE: CPT

## 2024-11-07 RX ORDER — LANOLIN ALCOHOL/MO/W.PET/CERES
400 CREAM (GRAM) TOPICAL DAILY
COMMUNITY

## 2024-11-07 RX ORDER — AMLODIPINE BESYLATE 5 MG/1
5 TABLET ORAL PRN
COMMUNITY

## 2024-11-07 RX ORDER — HYDROXYZINE HYDROCHLORIDE 25 MG/1
25 TABLET, FILM COATED ORAL EVERY 8 HOURS PRN
COMMUNITY

## 2024-11-07 RX ORDER — CETIRIZINE HYDROCHLORIDE 10 MG/1
10 TABLET ORAL DAILY
Status: ON HOLD | COMMUNITY
End: 2024-11-10 | Stop reason: HOSPADM

## 2024-11-07 RX ORDER — HYDROXYZINE HYDROCHLORIDE 25 MG/1
25 TABLET, FILM COATED ORAL EVERY 8 HOURS PRN
Status: DISCONTINUED | OUTPATIENT
Start: 2024-11-07 | End: 2024-11-08

## 2024-11-07 RX ORDER — ALBUTEROL SULFATE 90 UG/1
2 INHALANT RESPIRATORY (INHALATION) EVERY 6 HOURS PRN
COMMUNITY

## 2024-11-07 RX ORDER — PSYLLIUM HUSK 0.4 G
360 CAPSULE ORAL DAILY
COMMUNITY

## 2024-11-07 RX ORDER — CETIRIZINE HYDROCHLORIDE 10 MG/1
10 TABLET ORAL DAILY
Status: DISCONTINUED | OUTPATIENT
Start: 2024-11-07 | End: 2024-11-10 | Stop reason: HOSPADM

## 2024-11-07 RX ORDER — LOSARTAN POTASSIUM 50 MG/1
50 TABLET ORAL PRN
Status: ON HOLD | COMMUNITY
End: 2024-11-10 | Stop reason: HOSPADM

## 2024-11-07 RX ORDER — CHOLECALCIFEROL (VITAMIN D3) 25 MCG
1000 CAPSULE ORAL DAILY
COMMUNITY

## 2024-11-07 RX ORDER — NICOTINE 21 MG/24HR
1 PATCH, TRANSDERMAL 24 HOURS TRANSDERMAL DAILY
Status: DISCONTINUED | OUTPATIENT
Start: 2024-11-07 | End: 2024-11-10 | Stop reason: HOSPADM

## 2024-11-07 RX ADMIN — TRAZODONE HYDROCHLORIDE 50 MG: 50 TABLET ORAL at 21:03

## 2024-11-07 RX ADMIN — HYDROXYZINE HYDROCHLORIDE 25 MG: 25 TABLET, FILM COATED ORAL at 22:31

## 2024-11-07 RX ADMIN — CETIRIZINE HYDROCHLORIDE 10 MG: 10 TABLET, FILM COATED ORAL at 13:48

## 2024-11-07 RX ADMIN — IPRATROPIUM BROMIDE AND ALBUTEROL SULFATE 1 DOSE: 2.5; .5 SOLUTION RESPIRATORY (INHALATION) at 11:25

## 2024-11-07 RX ADMIN — SODIUM CHLORIDE, PRESERVATIVE FREE 10 ML: 5 INJECTION INTRAVENOUS at 08:34

## 2024-11-07 RX ADMIN — AMPICILLIN SODIUM AND SULBACTAM SODIUM 3000 MG: 2; 1 INJECTION, POWDER, FOR SOLUTION INTRAMUSCULAR; INTRAVENOUS at 18:09

## 2024-11-07 RX ADMIN — ENOXAPARIN SODIUM 40 MG: 100 INJECTION SUBCUTANEOUS at 08:33

## 2024-11-07 RX ADMIN — IPRATROPIUM BROMIDE AND ALBUTEROL SULFATE 1 DOSE: 2.5; .5 SOLUTION RESPIRATORY (INHALATION) at 08:53

## 2024-11-07 RX ADMIN — AMPICILLIN SODIUM AND SULBACTAM SODIUM 3000 MG: 2; 1 INJECTION, POWDER, FOR SOLUTION INTRAMUSCULAR; INTRAVENOUS at 12:31

## 2024-11-07 RX ADMIN — IPRATROPIUM BROMIDE AND ALBUTEROL SULFATE 1 DOSE: 2.5; .5 SOLUTION RESPIRATORY (INHALATION) at 00:14

## 2024-11-07 RX ADMIN — IPRATROPIUM BROMIDE AND ALBUTEROL SULFATE 1 DOSE: 2.5; .5 SOLUTION RESPIRATORY (INHALATION) at 15:27

## 2024-11-07 RX ADMIN — ROSUVASTATIN CALCIUM 20 MG: 20 TABLET, FILM COATED ORAL at 21:03

## 2024-11-07 RX ADMIN — ISOSORBIDE DINITRATE 5 MG: 10 TABLET ORAL at 08:31

## 2024-11-07 RX ADMIN — AMPICILLIN SODIUM AND SULBACTAM SODIUM 3000 MG: 2; 1 INJECTION, POWDER, FOR SOLUTION INTRAMUSCULAR; INTRAVENOUS at 00:36

## 2024-11-07 RX ADMIN — ISOSORBIDE DINITRATE 5 MG: 10 TABLET ORAL at 21:02

## 2024-11-07 RX ADMIN — HYDROXYZINE HYDROCHLORIDE 25 MG: 25 TABLET, FILM COATED ORAL at 13:48

## 2024-11-07 RX ADMIN — IPRATROPIUM BROMIDE AND ALBUTEROL SULFATE 1 DOSE: 2.5; .5 SOLUTION RESPIRATORY (INHALATION) at 20:13

## 2024-11-07 RX ADMIN — SENNOSIDES AND DOCUSATE SODIUM 1 TABLET: 8.6; 5 TABLET ORAL at 08:31

## 2024-11-07 RX ADMIN — PANTOPRAZOLE SODIUM 40 MG: 40 TABLET, DELAYED RELEASE ORAL at 21:03

## 2024-11-07 RX ADMIN — PANTOPRAZOLE SODIUM 40 MG: 40 TABLET, DELAYED RELEASE ORAL at 08:31

## 2024-11-07 RX ADMIN — ESCITALOPRAM OXALATE 10 MG: 10 TABLET ORAL at 08:32

## 2024-11-07 RX ADMIN — AMPICILLIN SODIUM AND SULBACTAM SODIUM 3000 MG: 2; 1 INJECTION, POWDER, FOR SOLUTION INTRAMUSCULAR; INTRAVENOUS at 06:18

## 2024-11-07 RX ADMIN — SODIUM CHLORIDE 125 ML/HR: 9 INJECTION, SOLUTION INTRAVENOUS at 00:34

## 2024-11-07 RX ADMIN — ACETAMINOPHEN 650 MG: 325 TABLET ORAL at 01:52

## 2024-11-07 ASSESSMENT — PAIN SCALES - GENERAL
PAINLEVEL_OUTOF10: 7
PAINLEVEL_OUTOF10: 0

## 2024-11-07 NOTE — ED NOTES
Pt does not want the blood pressure cuff on her arm because it is painful when it inflates. MD made aware.

## 2024-11-07 NOTE — SIGNIFICANT EVENT
1542 MD entered pt's room and noticed pt was not acting the same and seemed to have a bit of a droop in her eyelid.   1543 Code S Level 1 called.   1544 POC   1545 VS taken: T 98.4 P101 R20 /70 SpO2 94% 2L/NC; CMT called and spoke with teleneurologist  1546 Labs Drawn by phlebotomy, Pt to CT with nurse and Rad Tech, Monitoring in place.

## 2024-11-07 NOTE — CARE COORDINATION
Care Management Initial Assessment       RUR: n/a obs   Readmission? No  1st IM letter given? No  1st  letter given: No     11/07/24 1410   Service Assessment   Patient Orientation Alert and Oriented   Cognition Alert   History Provided By Patient   Primary Caregiver Self   Accompanied By/Relationship Spouse Kalpesh Eastman   Support Systems Spouse/Significant Other   Patient's Healthcare Decision Maker is: Legal Next of Kin   PCP Verified by CM Yes  (Sarah Arguello NP)   Last Visit to PCP Within last 3 months   Prior Functional Level Independent in ADLs/IADLs   Current Functional Level Independent in ADLs/IADLs   Can patient return to prior living arrangement Yes   Ability to make needs known: Good   Family able to assist with home care needs: Yes   Would you like for me to discuss the discharge plan with any other family members/significant others, and if so, who? Yes  (Spouse)   Financial Resources Medicare   Social/Functional History   Lives With Spouse   Type of Home House   Home Equipment Cane;Rollator   Active  Yes   Discharge Planning   Type of Residence House   Living Arrangements Spouse/Significant Other   Current Services Prior To Admission None   Potential Assistance Needed N/A   Type of Home Care Services None   Patient expects to be discharged to: House     Ms. Eastman lives at home with her  Kalpesh. She is independent with adls/iadls. Does NOT use any DME. Does NOT anticipate any needs after dc. She is currently in obs. HOWARD obtained see doc list. Ms. Eastman and spouse provided CM info and told to call if they have any questions or concerns during/after her stay.     Advance Care Planning   Healthcare Decision Maker:    Primary Decision Maker: Kalpesh Eastman - Spouse - [phone]    Click here to complete Healthcare Decision Makers including selection of the Healthcare Decision Maker Relationship (ie \"Primary\").  Today we documented Decision Maker(s) consistent with Legal Next

## 2024-11-07 NOTE — ED NOTES
Patient states that she does not like the gown because it is getting caught on everything. Patient requesting  to put her pajama shirt back on.

## 2024-11-07 NOTE — ED NOTES
Admission SBAR Note  Situation/Background: Pt arrived to the ED on 11/6 with complaints of low oxygen levels. Per the patient, her sats were anywhere from 87-90%. She also stated she has been feeling fatigued with low energy levels. Patient on 2L of oxygen nasal cannula. Alert & oriented x 4, ambulatory and independent    Patient is being transferred to [facility] Room# 138    Patient's Chief Complaint was hypoxia and is admitted for acute respiratory failure and aspiration pneumonia.    CODE STATUS: Full  CSSRS: 0 - No Risk    ISOLATION/PRECAUTIONS: No  ISOLATION TYPE:     Is this a behavioral health patient? No  Has wanding been completed   Are belongings secure?     Called outstanding consults:     STAT labs collected: Yes    Repeat Lactic Acid DUE?   TIME DUE:     All STAT orders are complete: Yes    The following personal items will be sent with the patient during transfer to the floor:     All valuables: glasses,  with patient at bedside , purse,  with patient at bedside , and clothing pajamas, sweater, slippers,  with patient at bedside       ASSESSMENT:    NEURO:   NIH SCORE:    JULIO SWALLOW SCREEN COMPLETE:   ORIENTATION LEVEL: ORIENTATION LEVEL: Person, Place, Time, and Situation  Cognition: appropriate for age attention/concentration and following commands  follows two step commands/direction  Speech: shows no evidence of impairment    Is patient impulsive? No  Is patient oriented? Yes  Do they follow commands? Yes  Is the patient ambulatory? Yes    FALL RISK? Yes  Interventions: Implemented/recommended use of non-skid footwear    RESPIRATORY:   Is patient on oxygen? Yes  Oxygen therapy: Nasal cannula  O2 rate: 2L    CARDIAC:   Is cardiac monitoring ordered? Yes    Last Rhythm: Rhythm including paccardio: Normal Sinus Rhythm 95  Patient to transfer with tele box on? Yes  Infusions: Meds; iv fluids: normal saline  LINE ACCESS: 20G

## 2024-11-07 NOTE — PROGRESS NOTES
Pharmacy Clarification of Prior to Admission Medication Regimen     The patient was interviewed regarding clarification of the prior to admission medication regimen. Patient present in room and obtained permission from patient to discuss drug regimen with visitor(s) present.     Information Obtained From: Patient    Allergies updated/ Reaction: Ace inhibitors, Atorvastatin, Adhesive Tape    Organizer (pill box, bottles, etc): Pill Organizer    Additions: Albuterol inhaler, Amlodipine, Hydroxyzine, Losartan, Vitamin D, Fish Oil, Magnesium, Cetirizine    Medications that need DELETION: Azithromycin, Oxycodone, Senokot-S    Changes: None    Pertinent Pharmacy Findings:  Updated patient’s preferred outpatient pharmacy to: [facility] in [place], [place]      Patient was questioned regarding use of any other inhalers, topical products, over the counter medications, herbal medications, vitamin products or ophthalmic/nasal/otic medication use.        Patient was inquired about side effects and was asked about pharmacist consultation if needed or desired (Y/N): Y      PTA medication list was corrected to the following:     Prior to Admission Medications   Prescriptions Last Dose Informant Patient Reported? Taking?   Omega-3 Fatty Acids (FISH OIL) 360 MG CAPS 11/6/2024  Yes Yes   Sig: Take 360 mg by mouth daily   albuterol sulfate HFA (VENTOLIN HFA) 108 (90 Base) MCG/ACT inhaler Past Week  Yes Yes   Sig: Inhale 2 puffs into the lungs every 6 hours as needed for Wheezing or Shortness of Breath   amLODIPine (NORVASC) 5 MG tablet Unknown  Yes Yes   Sig: Take 1 tablet by mouth as needed (Only if Blood Pressure is high after taking Isosorbide)   cetirizine (ZYRTEC) 10 MG tablet 11/6/2024  Yes Yes   Sig: Take 1 tablet by mouth daily   escitalopram (LEXAPRO) 10 MG tablet 11/6/2024  Yes Yes   Sig: Take 1 tablet by mouth daily   hydrOXYzine HCl (ATARAX) 25 MG tablet 11/6/2024  Yes Yes   Sig: Take 1 tablet by mouth every 8 hours as needed for

## 2024-11-07 NOTE — PROGRESS NOTES
Hospitalist Progress Note    NAME:   Hanane Eastman   : 1950   MRN: [id number]     Date/Time: 2024 5:58 PM  Patient PCP: Sarah Arguello APRN - NP      Assessment / Plan:    Acute respiratory failure with hypoxia  Cervical spondylosis with radiculopathy s/p anterior cervical diskectomy and fusion c4-5 with biomechanical implant graft and anterior locking plate  -patient was recently hospitalized at [facility] for an elective anterior cervical diskectomy and hospital course was complicated by acute respiratory failure requiring urgent re-intubation in the PACU  -O2 saturations at home in the 80's, currently stable on 2L NC  -CT chest W WO concerning for partial mucous plugging of the right lower lobe concerning for aspiration  -continue duonebs, IV steroids, O2 as tolerated with chest percussion  -speech therapy consult given recent anterior cervical diskectomy and concern for aspiration     3.   Hypertension  4.   Hyperlipidemia  -continue outpatient regimen     5.   GERD  -continue PPI therapy    6.  Change in mental status  -this afternoon patient ws noted to be more confused with family  -CODE STROKE was called, STAT CT head = CT C-spine obtained were negative for acute findings  -seen by Teleneurology who did not think patient had an acute CVA, likely hospital delirium given recent admission to [facility].  UA pending, will continue to monitor with neuro checks and if symptoms do not resolve will obtain MRI of the brain for further evaluation    Code Status: FULL  DVT Prophylaxis: Lovenox      Subjective:     Chief Complaint / Reason for Physician Visit  This am patients  noted intermittent confusion.  Patient Aox3 on my exam and able to follow commands however agitated.        Objective:     VITALS:   Last 24hrs VS reviewed since prior progress note. Most recent are:  Patient Vitals for the past 24 hrs:   BP Temp Temp src Pulse Resp SpO2 Height Weight   24 1559 -- -- -- (!) 101 -- -- --

## 2024-11-07 NOTE — PROGRESS NOTES
Speech Pathology Note    Orders received and appreciated. Note SLP consulted due to concern for aspiration. SLP to follow for formal evaluation pending discussion with medical team. Thank you.    Addendum 14:10  Discussed case with RN and Rehab Tech. RN reports patient just received sedating medication given restlessness and confusion and is not currently appropriate for SLP evaluation. Patient appears to be at risk for aspiration given recent admission to [facility] for anterior cervical diskectomy and fusion C4-5 s/p failed extubation, requiring re-intubation following CODE S called and transfer to ICU, and now acute admission for hypoxia and CTA Chest revealing \"There is partial mucous plugging of right lower lobe segmental bronchi with  corresponding airspace disease versus aspiration.\" Patient will benefit from SLP evaluation when appropriate. Will continue to follow.    JARON Chavira  Contact via [employer]

## 2024-11-07 NOTE — ED NOTES
Patient noted to be soaking wet with urine. This nurse and ARMANDO Leung removed soiled linen, cleaned patient and applied clean linens and new purewick. Patient noted to have some redness/excoriation to groin.

## 2024-11-08 ENCOUNTER — APPOINTMENT (OUTPATIENT)
Facility: HOSPITAL | Age: 74
DRG: 189 | End: 2024-11-08
Payer: MEDICARE

## 2024-11-08 PROBLEM — J96.20 ACUTE AND CHRONIC RESPIRATORY FAILURE (ACUTE-ON-CHRONIC): Status: ACTIVE | Noted: 2024-11-08

## 2024-11-08 PROBLEM — K21.9 GASTROESOPHAGEAL REFLUX DISEASE WITHOUT ESOPHAGITIS: Chronic | Status: ACTIVE | Noted: 2024-11-08

## 2024-11-08 PROBLEM — R13.13 PHARYNGEAL DYSPHAGIA: Chronic | Status: ACTIVE | Noted: 2024-11-08

## 2024-11-08 PROBLEM — R13.13 PHARYNGEAL DYSPHAGIA: Status: ACTIVE | Noted: 2024-11-08

## 2024-11-08 LAB
ANION GAP SERPL CALC-SCNC: ABNORMAL MMOL/L (ref 2–12)
APPEARANCE UR: CLEAR
ARTERIAL PATENCY WRIST A: ABNORMAL
BACTERIA URNS QL MICRO: NEGATIVE /HPF
BASE EXCESS BLDA CALC-SCNC: 8 MMOL/L
BASOPHILS # BLD: 0.1 K/UL (ref 0–0.1)
BASOPHILS NFR BLD: 0 % (ref 0–1)
BDY SITE: ABNORMAL
BILIRUB UR QL: NEGATIVE
BUN SERPL-MCNC: 5 MG/DL (ref 6–20)
BUN/CREAT SERPL: 11 (ref 12–20)
CALCIUM SERPL-MCNC: 9.2 MG/DL (ref 8.5–10.1)
CHLORIDE SERPL-SCNC: 100 MMOL/L (ref 97–108)
CO2 SERPL-SCNC: 40 MMOL/L (ref 21–32)
COLOR UR: ABNORMAL
CREAT SERPL-MCNC: 0.45 MG/DL (ref 0.55–1.02)
DIFFERENTIAL METHOD BLD: ABNORMAL
EOSINOPHIL # BLD: 0.1 K/UL (ref 0–0.4)
EOSINOPHIL NFR BLD: 0 % (ref 0–7)
EPITH CASTS URNS QL MICRO: ABNORMAL /LPF
ERYTHROCYTE [DISTWIDTH] IN BLOOD BY AUTOMATED COUNT: 12.4 % (ref 11.5–14.5)
FIO2 ON VENT: 26 %
GAS FLOW.O2 O2 DELIVERY SYS: 1.5 L/MIN
GLUCOSE SERPL-MCNC: 113 MG/DL (ref 65–100)
GLUCOSE UR STRIP.AUTO-MCNC: NEGATIVE MG/DL
HCO3 BLDA-SCNC: 35 MMOL/L (ref 22–26)
HCT VFR BLD AUTO: 39.8 % (ref 35–47)
HGB BLD-MCNC: 12.9 G/DL (ref 11.5–16)
HGB UR QL STRIP: NEGATIVE
IMM GRANULOCYTES # BLD AUTO: 0.1 K/UL (ref 0–0.04)
IMM GRANULOCYTES NFR BLD AUTO: 1 % (ref 0–0.5)
KETONES UR QL STRIP.AUTO: NEGATIVE MG/DL
LEUKOCYTE ESTERASE UR QL STRIP.AUTO: ABNORMAL
LYMPHOCYTES # BLD: 2.1 K/UL (ref 0.8–3.5)
LYMPHOCYTES NFR BLD: 8 % (ref 12–49)
MCH RBC QN AUTO: 30.6 PG (ref 26–34)
MCHC RBC AUTO-ENTMCNC: 32.4 G/DL (ref 30–36.5)
MCV RBC AUTO: 94.3 FL (ref 80–99)
MONOCYTES # BLD: 1.4 K/UL (ref 0–1)
MONOCYTES NFR BLD: 6 % (ref 5–13)
NEUTS SEG # BLD: 20.6 K/UL (ref 1.8–8)
NEUTS SEG NFR BLD: 85 % (ref 32–75)
NITRITE UR QL STRIP.AUTO: NEGATIVE
NRBC # BLD: 0 K/UL (ref 0–0.01)
NRBC BLD-RTO: 0 PER 100 WBC
PCO2 BLDA: 55 MMHG (ref 35–45)
PH BLDA: 7.41 (ref 7.35–7.45)
PH UR STRIP: 8 (ref 5–8)
PLATELET # BLD AUTO: 448 K/UL (ref 150–400)
PMV BLD AUTO: 8.7 FL (ref 8.9–12.9)
PO2 BLDA: 62 MMHG (ref 80–100)
POTASSIUM SERPL-SCNC: 3.7 MMOL/L (ref 3.5–5.1)
PROT UR STRIP-MCNC: NEGATIVE MG/DL
RBC # BLD AUTO: 4.22 M/UL (ref 3.8–5.2)
RBC #/AREA URNS HPF: ABNORMAL /HPF (ref 0–5)
SAO2 % BLD: 92 % (ref 92–97)
SAO2% DEVICE SAO2% SENSOR NAME: ABNORMAL
SODIUM SERPL-SCNC: 139 MMOL/L (ref 136–145)
SP GR UR REFRACTOMETRY: 1.01 (ref 1–1.03)
SPECIMEN SITE: ABNORMAL
UROBILINOGEN UR QL STRIP.AUTO: 0.2 EU/DL (ref 0.2–1)
WBC # BLD AUTO: 24.4 K/UL (ref 3.6–11)
WBC URNS QL MICRO: ABNORMAL /HPF (ref 0–4)

## 2024-11-08 PROCEDURE — 82803 BLOOD GASES ANY COMBINATION: CPT

## 2024-11-08 PROCEDURE — 94667 MNPJ CHEST WALL 1ST: CPT

## 2024-11-08 PROCEDURE — 2700000000 HC OXYGEN THERAPY PER DAY

## 2024-11-08 PROCEDURE — 2580000003 HC RX 258: Performed by: INTERNAL MEDICINE

## 2024-11-08 PROCEDURE — 94761 N-INVAS EAR/PLS OXIMETRY MLT: CPT

## 2024-11-08 PROCEDURE — 71045 X-RAY EXAM CHEST 1 VIEW: CPT

## 2024-11-08 PROCEDURE — 36600 WITHDRAWAL OF ARTERIAL BLOOD: CPT

## 2024-11-08 PROCEDURE — 36415 COLL VENOUS BLD VENIPUNCTURE: CPT

## 2024-11-08 PROCEDURE — 92610 EVALUATE SWALLOWING FUNCTION: CPT

## 2024-11-08 PROCEDURE — 96366 THER/PROPH/DIAG IV INF ADDON: CPT

## 2024-11-08 PROCEDURE — 1100000000 HC RM PRIVATE

## 2024-11-08 PROCEDURE — 94668 MNPJ CHEST WALL SBSQ: CPT

## 2024-11-08 PROCEDURE — 85025 COMPLETE CBC W/AUTO DIFF WBC: CPT

## 2024-11-08 PROCEDURE — 6360000002 HC RX W HCPCS: Performed by: INTERNAL MEDICINE

## 2024-11-08 PROCEDURE — 80048 BASIC METABOLIC PNL TOTAL CA: CPT

## 2024-11-08 PROCEDURE — 92612 ENDOSCOPY SWALLOW (FEES) VID: CPT

## 2024-11-08 PROCEDURE — 6370000000 HC RX 637 (ALT 250 FOR IP): Performed by: INTERNAL MEDICINE

## 2024-11-08 PROCEDURE — 94640 AIRWAY INHALATION TREATMENT: CPT

## 2024-11-08 RX ORDER — OXYCODONE HYDROCHLORIDE 5 MG/1
5 TABLET ORAL EVERY 8 HOURS PRN
Status: DISCONTINUED | OUTPATIENT
Start: 2024-11-08 | End: 2024-11-10 | Stop reason: HOSPADM

## 2024-11-08 RX ORDER — ALBUTEROL SULFATE 0.83 MG/ML
2.5 SOLUTION RESPIRATORY (INHALATION) EVERY 4 HOURS PRN
Status: DISCONTINUED | OUTPATIENT
Start: 2024-11-08 | End: 2024-11-10 | Stop reason: HOSPADM

## 2024-11-08 RX ORDER — TRAZODONE HYDROCHLORIDE 50 MG/1
25 TABLET, FILM COATED ORAL NIGHTLY
Status: DISCONTINUED | OUTPATIENT
Start: 2024-11-08 | End: 2024-11-10 | Stop reason: HOSPADM

## 2024-11-08 RX ADMIN — IPRATROPIUM BROMIDE AND ALBUTEROL SULFATE 1 DOSE: 2.5; .5 SOLUTION RESPIRATORY (INHALATION) at 11:55

## 2024-11-08 RX ADMIN — SODIUM CHLORIDE, PRESERVATIVE FREE 10 ML: 5 INJECTION INTRAVENOUS at 09:05

## 2024-11-08 RX ADMIN — AMPICILLIN SODIUM AND SULBACTAM SODIUM 3000 MG: 2; 1 INJECTION, POWDER, FOR SOLUTION INTRAMUSCULAR; INTRAVENOUS at 14:01

## 2024-11-08 RX ADMIN — AMPICILLIN SODIUM AND SULBACTAM SODIUM 3000 MG: 2; 1 INJECTION, POWDER, FOR SOLUTION INTRAMUSCULAR; INTRAVENOUS at 00:08

## 2024-11-08 RX ADMIN — ISOSORBIDE DINITRATE 5 MG: 10 TABLET ORAL at 09:04

## 2024-11-08 RX ADMIN — AMPICILLIN SODIUM AND SULBACTAM SODIUM 3000 MG: 2; 1 INJECTION, POWDER, FOR SOLUTION INTRAMUSCULAR; INTRAVENOUS at 17:53

## 2024-11-08 RX ADMIN — IPRATROPIUM BROMIDE AND ALBUTEROL SULFATE 1 DOSE: 2.5; .5 SOLUTION RESPIRATORY (INHALATION) at 08:14

## 2024-11-08 RX ADMIN — ROSUVASTATIN CALCIUM 20 MG: 20 TABLET, FILM COATED ORAL at 21:57

## 2024-11-08 RX ADMIN — TRAZODONE HYDROCHLORIDE 25 MG: 50 TABLET ORAL at 21:56

## 2024-11-08 RX ADMIN — AMPICILLIN SODIUM AND SULBACTAM SODIUM 3000 MG: 2; 1 INJECTION, POWDER, FOR SOLUTION INTRAMUSCULAR; INTRAVENOUS at 05:55

## 2024-11-08 RX ADMIN — SODIUM CHLORIDE, PRESERVATIVE FREE 10 ML: 5 INJECTION INTRAVENOUS at 22:00

## 2024-11-08 RX ADMIN — ENOXAPARIN SODIUM 40 MG: 100 INJECTION SUBCUTANEOUS at 09:03

## 2024-11-08 RX ADMIN — SODIUM CHLORIDE: 9 INJECTION, SOLUTION INTRAVENOUS at 13:57

## 2024-11-08 RX ADMIN — IPRATROPIUM BROMIDE AND ALBUTEROL SULFATE 1 DOSE: 2.5; .5 SOLUTION RESPIRATORY (INHALATION) at 19:36

## 2024-11-08 RX ADMIN — PANTOPRAZOLE SODIUM 40 MG: 40 TABLET, DELAYED RELEASE ORAL at 21:56

## 2024-11-08 RX ADMIN — PANTOPRAZOLE SODIUM 40 MG: 40 TABLET, DELAYED RELEASE ORAL at 09:04

## 2024-11-08 RX ADMIN — ISOSORBIDE DINITRATE 5 MG: 10 TABLET ORAL at 21:57

## 2024-11-08 RX ADMIN — IPRATROPIUM BROMIDE AND ALBUTEROL SULFATE 1 DOSE: 2.5; .5 SOLUTION RESPIRATORY (INHALATION) at 04:49

## 2024-11-08 RX ADMIN — IPRATROPIUM BROMIDE AND ALBUTEROL SULFATE 1 DOSE: 2.5; .5 SOLUTION RESPIRATORY (INHALATION) at 15:57

## 2024-11-08 RX ADMIN — CETIRIZINE HYDROCHLORIDE 10 MG: 10 TABLET, FILM COATED ORAL at 09:04

## 2024-11-08 RX ADMIN — ESCITALOPRAM OXALATE 10 MG: 10 TABLET ORAL at 09:03

## 2024-11-08 NOTE — PLAN OF CARE
Speech Language Pathology  Flexible Endoscopic Evaluation of Swallowing-FEES  Patient: Hanane Eastman (74 y.o. female)  Date: 11/8/2024  Primary Diagnosis: Acute respiratory failure with hypoxia [J96.01]  Acute respiratory failure with hypoxemia [J96.01]  Aspiration pneumonia, unspecified aspiration pneumonia type, unspecified laterality, unspecified part of lung (HCC) [J69.0]  Acute and chronic respiratory failure (acute-on-chronic) [J96.20]       Precautions: Aspiration precautions, Reflux precautions                     ASSESSMENT :  The patient presents with objectively assessed mild-moderate pharyngeal dysphagia, likely acute on chronic related to patient's recent C4-5 ACDF, several intubations during that admission, and RLL airspace disease vs aspiration, and suspected esophageal dysphagia. Note narrow pharyngeal space observed related to posterior pharyngeal wall reduced bilaterally, suspect related to recent C4-5 ACDF. Aspiration observed with thin liquids presumed during the swallow due to decreased epiglottic inversion, resulting in suspected reduced pharyngeal squeeze, in which patient's reflexive cough was only intermittently effective to clear aspiration. Penetration of thin and mildly thick liquids observed after the swallow due to pyriform sinus residue as well as backflow observed through UES to the level of the pyriform sinuses through inter-arytenoid space (suspect regurgitation) to the level of the vocal folds. Patient observed with spontaneous throat clearing in response to penetration. Note no aspiration visualized with mildly thick liquids. Mild vallecular and mild-moderate pyriform sinus residue observed with consistencies of increased viscosity, in which patient observed with spontaneously elicited sequential swallow, which was effective to reduce pharyngeal residue. Mildly thick liquid wash was beneficial in clearing solid residue (observed great in L than R pyriform sinus).     At this time,

## 2024-11-08 NOTE — PLAN OF CARE
Speech LAnguage Pathology EVALUATION    Patient: Hanane Eastman (74 y.o. female)  Date: 11/8/2024  Primary Diagnosis: Acute respiratory failure with hypoxia [J96.01]  Acute respiratory failure with hypoxemia [J96.01]  Aspiration pneumonia, unspecified aspiration pneumonia type, unspecified laterality, unspecified part of lung (HCC) [J69.0]  Acute and chronic respiratory failure (acute-on-chronic) [J96.20]       Precautions: Aspiration precautions                    ASSESSMENT :  Patient with no overt signs of aspiration during initial clinical bedside swallow evaluation, however risk for aspiration, including silent aspiration, present given recent anterior cervical diskectomy and fusion C4-5 (10/28/24) s/p failed extubation, requiring re-intubation following CODE S called and transfer to ICU, and now acute admission for hypoxia and CTA Chest revealing \"There is partial mucous plugging of right lower lobe segmental bronchi with corresponding airspace disease versus aspiration.\" Recommend instrumental imaging to further evaluate pharyngeal swallow function.    Patient will benefit from skilled intervention to address the above impairments.     PLAN :  Recommendations and Planned Interventions:  -- Flexible Endoscopic Evaluation of Swallow (FEES) at bedside  -- Medication as tolerated  -- Stringent oral hygiene 2-3x/day with use of standard toothbrush       Recommend next SLP session: FEES    Acute SLP Services: SLP Plan of Care: 2 times/week. Patient's rehabilitation potential is considered to be Fair.  Discharge Recommendations: Continue to assess pending progress     SUBJECTIVE:   Patient stated, “I really like cookies, cakes, pies.”    OBJECTIVE:     Past Medical History:   Diagnosis Date    Anxiety     At risk for sleep apnea 10/15/2024    JUANPABLO 4    Basal cell carcinoma of skin     Colon polyps     GERD (gastroesophageal reflux disease)     Heart murmur     Dr. Back    Hyperlipidemia LDL goal < 100

## 2024-11-08 NOTE — PROGRESS NOTES
[facility]  Hospitalist Progress Note    NAME: Hanane Eastman   :  1950   MRN:  [id number]     Total duration of encounter: 2 days      Interim Hospital Summary: 74 y.o. female who presented on 2024 with Acute respiratory failure with hypoxemia. She has a past medical history of Anxiety, At risk for sleep apnea, Basal cell carcinoma of skin, Colon polyps, GERD (gastroesophageal reflux disease), Heart murmur, Hyperlipidemia LDL goal < 100, Hypertension, and Osteopenia..       Pt presenting to ED c/o SOB and cough with acute respiratory failure  No prior dx of COPD  [facility]  C4-5 ANTERIOR CERVICAL DISCECTOMY WITH FUSION 10/28  Post Op in PACU she became obtunded and ABG noted pH6.9 with pCO2 >150 requiring intubation  Pulm Med tx Azithromycin and Bronchodilators  Completed the Zpack.   Did not take any of the Oxycodone Rx    Subjective:     Chief Complaint / Reason for Physician Visit  \"fine\".  Discussed with RN   Oxygen was advanced to 2 l/min NC during the night  Reduced this AM 1 l/min NC with ABG planned    Review of Systems:  Symptom Y/N Comments  Symptom Y/N Comments   Fever/Chills n   Chest Pain n    Poor Appetite n   Edema n    Cough y   Abdominal Pain n    Sputum n  Not this am  Joint Pain n    SOB/MCLAUGHLIN n   Pruritis/Rash n    Nausea/vomit n   Tolerating PT/OT y    Diarrhea n   Tolerating Diet y    Constipation n   Other         Current Facility-Administered Medications:     albuterol (PROVENTIL) (2.5 MG/3ML) 0.083% nebulizer solution 2.5 mg, 2.5 mg, Nebulization, Q4H PRN, Brice Diallo MD    oxyCODONE (ROXICODONE) immediate release tablet 5 mg, 5 mg, Oral, Q8H PRN, Aureliano Jenkins MD    traZODone (DESYREL) tablet 25 mg, 25 mg, Oral, Nightly, Aureliano Jenkins MD    cetirizine (ZYRTEC) tablet 10 mg, 10 mg, Oral, Daily, Lia Martinez MD, 10 mg at 24 0904    nicotine (NICODERM CQ) 21 MG/24HR 1 patch, 1 patch, TransDERmal, Daily, Lia Martinez

## 2024-11-08 NOTE — CARE COORDINATION
Important Message from Medicare Letter provided to Kalpesh Eastman Oral explanation was provided and all questions answered. Signed document placed on the bedside chart to be scanned under the media tab. Copy provided to  Kalpesh Eastman

## 2024-11-08 NOTE — PLAN OF CARE
Problem: Respiratory - Adult  Goal: Achieves optimal ventilation and oxygenation  11/8/2024 0845 by Kirill Griffin, RT  Outcome: Progressing  11/7/2024 2056 by Liu Espinal RCP  Outcome: Progressing      done

## 2024-11-08 NOTE — PLAN OF CARE
Problem: Respiratory - Adult  Goal: Achieves optimal ventilation and oxygenation  11/7/2024 2056 by Liu Espinal RCP  Outcome: Progressing  11/7/2024 0900 by Natalia Odonnell, RT  Outcome: Progressing

## 2024-11-09 LAB
EKG ATRIAL RATE: 81 BPM
EKG DIAGNOSIS: NORMAL
EKG P AXIS: 74 DEGREES
EKG P-R INTERVAL: 158 MS
EKG Q-T INTERVAL: 370 MS
EKG QRS DURATION: 82 MS
EKG QTC CALCULATION (BAZETT): 429 MS
EKG R AXIS: -29 DEGREES
EKG T AXIS: 47 DEGREES
EKG VENTRICULAR RATE: 81 BPM

## 2024-11-09 PROCEDURE — 94761 N-INVAS EAR/PLS OXIMETRY MLT: CPT

## 2024-11-09 PROCEDURE — 94668 MNPJ CHEST WALL SBSQ: CPT

## 2024-11-09 PROCEDURE — 2580000003 HC RX 258: Performed by: INTERNAL MEDICINE

## 2024-11-09 PROCEDURE — 6370000000 HC RX 637 (ALT 250 FOR IP): Performed by: INTERNAL MEDICINE

## 2024-11-09 PROCEDURE — 6360000002 HC RX W HCPCS: Performed by: INTERNAL MEDICINE

## 2024-11-09 PROCEDURE — 2700000000 HC OXYGEN THERAPY PER DAY

## 2024-11-09 PROCEDURE — 94640 AIRWAY INHALATION TREATMENT: CPT

## 2024-11-09 PROCEDURE — 1100000000 HC RM PRIVATE

## 2024-11-09 RX ORDER — AMLODIPINE BESYLATE 5 MG/1
5 TABLET ORAL DAILY
Status: DISCONTINUED | OUTPATIENT
Start: 2024-11-09 | End: 2024-11-10 | Stop reason: HOSPADM

## 2024-11-09 RX ORDER — NYSTATIN 100000 [USP'U]/ML
5 SUSPENSION ORAL 4 TIMES DAILY
Status: DISCONTINUED | OUTPATIENT
Start: 2024-11-09 | End: 2024-11-10 | Stop reason: HOSPADM

## 2024-11-09 RX ORDER — TRIAMCINOLONE ACETONIDE 1 MG/G
CREAM TOPICAL 2 TIMES DAILY
Status: DISCONTINUED | OUTPATIENT
Start: 2024-11-09 | End: 2024-11-10 | Stop reason: HOSPADM

## 2024-11-09 RX ADMIN — SODIUM CHLORIDE: 9 INJECTION, SOLUTION INTRAVENOUS at 01:11

## 2024-11-09 RX ADMIN — PANTOPRAZOLE SODIUM 40 MG: 40 TABLET, DELAYED RELEASE ORAL at 09:49

## 2024-11-09 RX ADMIN — AMLODIPINE BESYLATE 5 MG: 5 TABLET ORAL at 14:30

## 2024-11-09 RX ADMIN — SODIUM CHLORIDE, PRESERVATIVE FREE 10 ML: 5 INJECTION INTRAVENOUS at 09:52

## 2024-11-09 RX ADMIN — ESCITALOPRAM OXALATE 10 MG: 10 TABLET ORAL at 09:48

## 2024-11-09 RX ADMIN — AMPICILLIN SODIUM AND SULBACTAM SODIUM 3000 MG: 2; 1 INJECTION, POWDER, FOR SOLUTION INTRAMUSCULAR; INTRAVENOUS at 01:13

## 2024-11-09 RX ADMIN — IPRATROPIUM BROMIDE AND ALBUTEROL SULFATE 1 DOSE: 2.5; .5 SOLUTION RESPIRATORY (INHALATION) at 11:17

## 2024-11-09 RX ADMIN — SENNOSIDES AND DOCUSATE SODIUM 1 TABLET: 8.6; 5 TABLET ORAL at 21:15

## 2024-11-09 RX ADMIN — CETIRIZINE HYDROCHLORIDE 10 MG: 10 TABLET, FILM COATED ORAL at 09:49

## 2024-11-09 RX ADMIN — NYSTATIN 500000 UNITS: 100000 SUSPENSION ORAL at 17:54

## 2024-11-09 RX ADMIN — TRAZODONE HYDROCHLORIDE 25 MG: 50 TABLET ORAL at 21:15

## 2024-11-09 RX ADMIN — AMPICILLIN SODIUM AND SULBACTAM SODIUM 3000 MG: 2; 1 INJECTION, POWDER, FOR SOLUTION INTRAMUSCULAR; INTRAVENOUS at 06:41

## 2024-11-09 RX ADMIN — ISOSORBIDE DINITRATE 5 MG: 10 TABLET ORAL at 09:49

## 2024-11-09 RX ADMIN — SENNOSIDES AND DOCUSATE SODIUM 1 TABLET: 8.6; 5 TABLET ORAL at 09:48

## 2024-11-09 RX ADMIN — PANTOPRAZOLE SODIUM 40 MG: 40 TABLET, DELAYED RELEASE ORAL at 21:16

## 2024-11-09 RX ADMIN — SODIUM CHLORIDE, PRESERVATIVE FREE 10 ML: 5 INJECTION INTRAVENOUS at 21:21

## 2024-11-09 RX ADMIN — AMPICILLIN SODIUM AND SULBACTAM SODIUM 3000 MG: 2; 1 INJECTION, POWDER, FOR SOLUTION INTRAMUSCULAR; INTRAVENOUS at 18:01

## 2024-11-09 RX ADMIN — IPRATROPIUM BROMIDE AND ALBUTEROL SULFATE 1 DOSE: 2.5; .5 SOLUTION RESPIRATORY (INHALATION) at 15:31

## 2024-11-09 RX ADMIN — TRIAMCINOLONE ACETONIDE: 1 CREAM TOPICAL at 21:21

## 2024-11-09 RX ADMIN — ROSUVASTATIN CALCIUM 20 MG: 20 TABLET, FILM COATED ORAL at 21:15

## 2024-11-09 RX ADMIN — IPRATROPIUM BROMIDE AND ALBUTEROL SULFATE 1 DOSE: 2.5; .5 SOLUTION RESPIRATORY (INHALATION) at 19:26

## 2024-11-09 RX ADMIN — ISOSORBIDE DINITRATE 5 MG: 10 TABLET ORAL at 21:15

## 2024-11-09 RX ADMIN — TRIAMCINOLONE ACETONIDE: 1 CREAM TOPICAL at 14:30

## 2024-11-09 RX ADMIN — NYSTATIN 500000 UNITS: 100000 SUSPENSION ORAL at 21:16

## 2024-11-09 RX ADMIN — IPRATROPIUM BROMIDE AND ALBUTEROL SULFATE 1 DOSE: 2.5; .5 SOLUTION RESPIRATORY (INHALATION) at 07:11

## 2024-11-09 RX ADMIN — AMPICILLIN SODIUM AND SULBACTAM SODIUM 3000 MG: 2; 1 INJECTION, POWDER, FOR SOLUTION INTRAMUSCULAR; INTRAVENOUS at 12:21

## 2024-11-09 NOTE — PLAN OF CARE
Problem: Respiratory - Adult  Goal: Achieves optimal ventilation and oxygenation  11/9/2024 0716 by Aureliano Ziegler Sr., RCP  Outcome: Progressing  11/8/2024 2048 by Nubia Uriostegui LPN  Outcome: Progressing  11/8/2024 1930 by Isa Liu,   Outcome: Progressing

## 2024-11-09 NOTE — PLAN OF CARE
Problem: Respiratory - Adult  Goal: Achieves optimal ventilation and oxygenation  11/8/2024 2048 by Nubia Uriostegui LPN  Outcome: Progressing  11/8/2024 1930 by Isa Liu, RT  Outcome: Progressing  11/8/2024 0845 by Kirill Griffin, RT  Outcome: Progressing     Problem: Safety - Adult  Goal: Free from fall injury  Outcome: Progressing     Problem: Pain  Goal: Verbalizes/displays adequate comfort level or baseline comfort level  Outcome: Progressing     Problem: SLP Adult - Impaired Swallowing  Goal: By Discharge: Advance to least restrictive diet without signs or symptoms of aspiration for planned discharge setting.  See evaluation for individualized goals.  Description: Speech Pathology Goals  Initiated 11/8/2024    1. Patient will participate in Flexible Endoscopic Evaluation of Swallow (FEES) at bedside to further assess pharyngeal swallow function. MET 11/8/2024  Added 11/8/2024: 2. Patient will tolerate Regular/Mildly Thick Liquid diet without signs of aspiration or decline in respiratory status within 7 days.  11/8/2024 1304 by Armando Monzon, SLP  Outcome: Progressing  11/8/2024 1135 by Armando Monzon, SLP  Outcome: Progressing

## 2024-11-09 NOTE — PLAN OF CARE
Problem: Respiratory - Adult  Goal: Achieves optimal ventilation and oxygenation  11/8/2024 1930 by Isa Liu, RT  Outcome: Progressing  11/8/2024 0845 by Kirill Griffin, RT  Outcome: Progressing

## 2024-11-09 NOTE — PROGRESS NOTES
[facility]  Hospitalist Progress Note    NAME: Hanane Eastman   :  1950   MRN:  [id number]     Total duration of encounter: 3 days      Interim Hospital Summary: 74 y.o. female who presented on 2024 with Acute respiratory failure with hypoxemia. She has a past medical history of Anxiety, At risk for sleep apnea, Basal cell carcinoma of skin, Colon polyps, GERD (gastroesophageal reflux disease), Heart murmur, Hyperlipidemia LDL goal < 100, Hypertension, and Osteopenia..       Pt presenting to ED c/o SOB and cough with acute respiratory failure  No prior dx of COPD, but has been present in retrospect.  Pt seems tolerant to low O2 sats    Discharged 10/30 [facility] post  C4-5 ANTERIOR CERVICAL DISCECTOMY WITH FUSION 10/28  Post Op in PACU she became obtunded and ABG noted pH 6.9 with pCO2 >150 requiring re-intubation  Pulm Med tx Azithromycin and Bronchodilators  Completed the Zpack.   Did not take any of the Oxycodone Rx given on d/c    Has some chronic swallowing problems over several months  Seen by ENT and GI in [place]  Schedule to seen Dr. Velazquez, Pulm Med     Pt c/o tongue soreness on R side  ST was concerned with Thrush on her video    Subjective:     Chief Complaint / Reason for Physician Visit  \"good\".  Would like to go home  Discussed with RN   Have kept O2 at 1 l/min NC,  taking off frequently  Was smoking 1-2 ppd PTA    Cannot swallow phlegm very well  No using the suction  Has been simply spitting it out into cup  Getting up to bathroom,  taking off O2    Review of Systems:  Symptom Y/N Comments  Symptom Y/N Comments   Fever/Chills n   Chest Pain n    Poor Appetite n   Edema n    Cough y   Abdominal Pain n    Sputum n  Not this am  Joint Pain n    SOB/MCLAUGHLIN n   Pruritis/Rash n    Nausea/vomit n   Tolerating PT/OT y    Diarrhea n   Tolerating Diet y    Constipation n   Other         Current Facility-Administered Medications:     albuterol (PROVENTIL) (2.5 MG/3ML) 0.083%

## 2024-11-10 VITALS
SYSTOLIC BLOOD PRESSURE: 153 MMHG | DIASTOLIC BLOOD PRESSURE: 76 MMHG | BODY MASS INDEX: 18.99 KG/M2 | HEART RATE: 92 BPM | OXYGEN SATURATION: 92 % | HEIGHT: 65 IN | RESPIRATION RATE: 20 BRPM | WEIGHT: 114 LBS | TEMPERATURE: 97.9 F

## 2024-11-10 PROBLEM — F41.9 ANXIETY: Status: ACTIVE | Noted: 2023-02-28

## 2024-11-10 PROBLEM — J44.9 COPD (CHRONIC OBSTRUCTIVE PULMONARY DISEASE) (HCC): Status: ACTIVE | Noted: 2024-11-10

## 2024-11-10 PROBLEM — J18.9 NOSOCOMIAL PNEUMONIA: Status: ACTIVE | Noted: 2024-11-10
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
## 2024-11-10 LAB
ANION GAP SERPL CALC-SCNC: 4 MMOL/L (ref 2–12)
BUN SERPL-MCNC: 8 MG/DL (ref 6–20)
BUN/CREAT SERPL: 13 (ref 12–20)
CALCIUM SERPL-MCNC: 9.3 MG/DL (ref 8.5–10.1)
CHLORIDE SERPL-SCNC: 99 MMOL/L (ref 97–108)
CO2 SERPL-SCNC: 38 MMOL/L (ref 21–32)
CREAT SERPL-MCNC: 0.61 MG/DL (ref 0.55–1.02)
GLUCOSE SERPL-MCNC: 106 MG/DL (ref 65–100)
POTASSIUM SERPL-SCNC: 3.6 MMOL/L (ref 3.5–5.1)
SODIUM SERPL-SCNC: 141 MMOL/L (ref 136–145)

## 2024-11-10 PROCEDURE — 6360000002 HC RX W HCPCS: Performed by: INTERNAL MEDICINE

## 2024-11-10 PROCEDURE — 94640 AIRWAY INHALATION TREATMENT: CPT

## 2024-11-10 PROCEDURE — 6370000000 HC RX 637 (ALT 250 FOR IP): Performed by: INTERNAL MEDICINE

## 2024-11-10 PROCEDURE — 94761 N-INVAS EAR/PLS OXIMETRY MLT: CPT

## 2024-11-10 PROCEDURE — 80048 BASIC METABOLIC PNL TOTAL CA: CPT

## 2024-11-10 PROCEDURE — 2700000000 HC OXYGEN THERAPY PER DAY

## 2024-11-10 PROCEDURE — 36415 COLL VENOUS BLD VENIPUNCTURE: CPT

## 2024-11-10 PROCEDURE — 94668 MNPJ CHEST WALL SBSQ: CPT

## 2024-11-10 PROCEDURE — 2580000003 HC RX 258: Performed by: INTERNAL MEDICINE

## 2024-11-10 RX ORDER — ALPRAZOLAM 0.25 MG/1
0.25 TABLET ORAL 3 TIMES DAILY PRN
Qty: 30 TABLET | Refills: 0 | Status: SHIPPED | OUTPATIENT
Start: 2024-11-10 | End: 2024-11-20

## 2024-11-10 RX ORDER — NICOTINE 21 MG/24HR
1 PATCH, TRANSDERMAL 24 HOURS TRANSDERMAL DAILY
Qty: 30 PATCH | Refills: 0 | Status: SHIPPED | OUTPATIENT
Start: 2024-11-11

## 2024-11-10 RX ORDER — IPRATROPIUM BROMIDE AND ALBUTEROL SULFATE 2.5; .5 MG/3ML; MG/3ML
3 SOLUTION RESPIRATORY (INHALATION) 4 TIMES DAILY
Qty: 120 EACH | Refills: 0 | Status: SHIPPED | OUTPATIENT
Start: 2024-11-10 | End: 2024-12-10

## 2024-11-10 RX ORDER — NYSTATIN 100000 [USP'U]/ML
5 SUSPENSION ORAL 4 TIMES DAILY
Qty: 200 ML | Refills: 0 | Status: SHIPPED | OUTPATIENT
Start: 2024-11-10 | End: 2024-11-20

## 2024-11-10 RX ORDER — PREDNISONE 10 MG/1
TABLET ORAL
Qty: 15 TABLET | Refills: 0 | Status: SHIPPED | OUTPATIENT
Start: 2024-11-10 | End: 2024-11-20

## 2024-11-10 RX ORDER — TRIAMCINOLONE ACETONIDE 1 MG/G
CREAM TOPICAL
Qty: 60 G | Refills: 0 | Status: SHIPPED | OUTPATIENT
Start: 2024-11-10

## 2024-11-10 RX ADMIN — IPRATROPIUM BROMIDE AND ALBUTEROL SULFATE 1 DOSE: 2.5; .5 SOLUTION RESPIRATORY (INHALATION) at 11:16

## 2024-11-10 RX ADMIN — TRIAMCINOLONE ACETONIDE: 1 CREAM TOPICAL at 09:52

## 2024-11-10 RX ADMIN — ENOXAPARIN SODIUM 40 MG: 100 INJECTION SUBCUTANEOUS at 09:51

## 2024-11-10 RX ADMIN — SENNOSIDES AND DOCUSATE SODIUM 1 TABLET: 8.6; 5 TABLET ORAL at 09:50

## 2024-11-10 RX ADMIN — ESCITALOPRAM OXALATE 10 MG: 10 TABLET ORAL at 09:51

## 2024-11-10 RX ADMIN — AMPICILLIN SODIUM AND SULBACTAM SODIUM 3000 MG: 2; 1 INJECTION, POWDER, FOR SOLUTION INTRAMUSCULAR; INTRAVENOUS at 06:04

## 2024-11-10 RX ADMIN — ISOSORBIDE DINITRATE 5 MG: 10 TABLET ORAL at 09:51

## 2024-11-10 RX ADMIN — NYSTATIN 500000 UNITS: 100000 SUSPENSION ORAL at 09:51

## 2024-11-10 RX ADMIN — CETIRIZINE HYDROCHLORIDE 10 MG: 10 TABLET, FILM COATED ORAL at 09:51

## 2024-11-10 RX ADMIN — AMPICILLIN SODIUM AND SULBACTAM SODIUM 3000 MG: 2; 1 INJECTION, POWDER, FOR SOLUTION INTRAMUSCULAR; INTRAVENOUS at 12:11

## 2024-11-10 RX ADMIN — PANTOPRAZOLE SODIUM 40 MG: 40 TABLET, DELAYED RELEASE ORAL at 09:51

## 2024-11-10 RX ADMIN — AMPICILLIN SODIUM AND SULBACTAM SODIUM 3000 MG: 2; 1 INJECTION, POWDER, FOR SOLUTION INTRAMUSCULAR; INTRAVENOUS at 00:04

## 2024-11-10 RX ADMIN — AMLODIPINE BESYLATE 5 MG: 5 TABLET ORAL at 09:50

## 2024-11-10 RX ADMIN — IPRATROPIUM BROMIDE AND ALBUTEROL SULFATE 1 DOSE: 2.5; .5 SOLUTION RESPIRATORY (INHALATION) at 07:13

## 2024-11-10 NOTE — PLAN OF CARE
Problem: Respiratory - Adult  Goal: Achieves optimal ventilation and oxygenation  Outcome: Progressing     Problem: Safety - Adult  Goal: Free from fall injury  Outcome: Progressing     Problem: Pain  Goal: Verbalizes/displays adequate comfort level or baseline comfort level  Outcome: Progressing

## 2024-11-10 NOTE — PLAN OF CARE
Problem: Respiratory - Adult  Goal: Achieves optimal ventilation and oxygenation  11/10/2024 0718 by Aureliano Ziegler Sr., RCP  Outcome: Progressing  11/10/2024 0019 by America Castellano LPN  Outcome: Progressing  11/10/2024 0019 by America Castellano LPN  Outcome: Progressing

## 2024-11-10 NOTE — PLAN OF CARE
Problem: Respiratory - Adult  Goal: Achieves optimal ventilation and oxygenation  11/10/2024 1340 by Mary Ellen Garduno RN  Outcome: Adequate for Discharge  11/10/2024 1226 by Mary Ellen Garduno RN  Outcome: Progressing  11/10/2024 0718 by Aureliano Ziegler Sr., [facility]  Outcome: Progressing  11/10/2024 0019 by America Castellano LPN  Outcome: Progressing  11/10/2024 0019 by America Castellano LPN  Outcome: Progressing     Problem: Safety - Adult  Goal: Free from fall injury  11/10/2024 1340 by Mary Ellen Garduno RN  Outcome: Adequate for Discharge  11/10/2024 1226 by Mary Ellen Garduno RN  Outcome: Progressing  11/10/2024 0019 by America Castellano LPN  Outcome: Progressing  11/10/2024 0019 by America Castellano LPN  Outcome: Progressing     Problem: Pain  Goal: Verbalizes/displays adequate comfort level or baseline comfort level  11/10/2024 1340 by Mary Ellen Garduno RN  Outcome: Adequate for Discharge  11/10/2024 1226 by Mary Ellen Garduno RN  Outcome: Progressing  11/10/2024 0019 by America Castellano LPN  Outcome: Progressing  11/10/2024 0019 by America Castellano LPN  Outcome: Progressing

## 2024-11-10 NOTE — PROGRESS NOTES
Walked in the pt room this morning and the pt was getting dressed post shower with the assistance from the daughter. Pt then proceeded to tidy up room. O2 sats were checked and the pt was 90-90% with ambulation. Pt sat down and reached 92%.     @1055 This nurse walked pt from room, around nursing station, and back. Lowest the o2 sats got were 90% on room air. Pt was not short of breath. Pt family also had questions regarding recent slight confusion and restlessness. MD aware, will talk to family and pt.

## 2024-11-10 NOTE — DISCHARGE SUMMARY
22      Total time in minutes spent coordinating this discharge (includes going over instructions, follow-up, prescriptions, and preparing report for sign off to her PCP) :35 minutes    Signed:  Aureliano Jenkins MD  [facility] Hospitalist  [phone]

## 2024-11-10 NOTE — PROGRESS NOTES
Christina arrived to administer HHN. Patient was up and cleaning her room. No O2. Sat=90% on RA. Writer administered HHN. Patient was told to keep O2 on. Renator placed patient on .05 lpm NC post HHN

## 2024-11-10 NOTE — PLAN OF CARE
Problem: Respiratory - Adult  Goal: Achieves optimal ventilation and oxygenation  11/10/2024 0019 by America Castellano LPN  Outcome: Progressing  11/10/2024 0019 by America Castellano LPN  Outcome: Progressing     Problem: Safety - Adult  Goal: Free from fall injury  11/10/2024 0019 by America Castellano LPN  Outcome: Progressing  11/10/2024 0019 by America Castellano LPN  Outcome: Progressing     Problem: Pain  Goal: Verbalizes/displays adequate comfort level or baseline comfort level  11/10/2024 0019 by America Castellano LPN  Outcome: Progressing  11/10/2024 0019 by America Castellano LPN  Outcome: Progressing

## 2024-11-10 NOTE — PLAN OF CARE
Problem: Respiratory - Adult  Goal: Achieves optimal ventilation and oxygenation  11/10/2024 1226 by Mary Ellen Garduno RN  Outcome: Progressing  11/10/2024 0718 by Aureliano Ziegler Sr., P  Outcome: Progressing  11/10/2024 0019 by America Castellano LPN  Outcome: Progressing  11/10/2024 0019 by America Castellano LPN  Outcome: Progressing     Problem: Safety - Adult  Goal: Free from fall injury  11/10/2024 1226 by Mary Ellen Garduno RN  Outcome: Progressing  11/10/2024 0019 by America Castellano LPN  Outcome: Progressing  11/10/2024 0019 by America Castellano LPN  Outcome: Progressing     Problem: Pain  Goal: Verbalizes/displays adequate comfort level or baseline comfort level  11/10/2024 1226 by Mary Ellen Garduno RN  Outcome: Progressing  11/10/2024 0019 by America Castellano LPN  Outcome: Progressing  11/10/2024 0019 by America Castellano LPN  Outcome: Progressing

## 2024-11-10 NOTE — PROGRESS NOTES
Discharge Summary    Hanane Eastman  :  1950  MRN:  [id number]    ADMIT DATE:  2024  DISCHARGE DATE:  11/10/2024      Discharge instruction reviewed with patient and family. Kalpesh Eastman (spouse) and daughter in room. Written nebulizer prescription signed by Dr Jenkins and given to patient with discharge paperwork.    Home med's returned Yes    Personal belongings returned Yes    Patient walked to front entrance with Nurse.          SIGNED:    Mary Ellen Garduno RN

## 2024-11-10 NOTE — DISCHARGE INSTRUCTIONS
HOSPITALIST RECOMMENDATIONS    Keep planned appt with Dr. Velazquez 11/22  NO CIGARETTES  Continue Nicoderm Patches,  Add prn Xanax short-term use for withdrawal / anxiety  Augmentin for tx Aspiration / HCAP  Short-term Prednisone taper - twice daily for 5 days, then once daily for 5 days  DuoNebs four daily.   Albuterol Inhaler q4h prn with spacer between nebs only if needed  Nystatin swish and swallow for 1 week  Hold Zyrtec for now since this would be drying  CAL HARTMANN MD   [phone]

## 2024-11-11 ENCOUNTER — TELEPHONE (OUTPATIENT)
Age: 74
End: 2024-11-11

## 2024-11-11 NOTE — TELEPHONE ENCOUNTER
Care Transitions Initial Follow Up Call    Outreach made within 2 business days of discharge: Yes    Patient: Sushila Vitale Patient : 1950   MRN: [id number]  Reason for Admission: hypoemia  Discharge Date: 11/10/24       Spoke with: sushila vitale    Discharge department/facility: [facility] Interactive Patient Contact:  Was patient able to fill all prescriptions: Yes  Was patient instructed to bring all medications to the follow-up visit: Yes  Is patient taking all medications as directed in the discharge summary? Yes  Does patient understand their discharge instructions: Yes  Does patient have questions or concerns that need addressed prior to 7-14 day follow up office visit: no    Additional needs identified to be addressed with provider  No needs identified             Scheduled appointment with PCP within 7-14 days    Follow Up  Future Appointments   Date Time Provider Department Center   2024  2:30 PM Sarah Arguello, APRN - NP [facility] MAIN [facility] DEP       Jhoana Livingston LPN

## 2024-11-11 NOTE — TELEPHONE ENCOUNTER
Discharged from [facility] on 11/10 needs CARLEE appt and CARLEE call,  We can use one of those slots blocked on 11/18 for her follow up with Sarah lee.   Do Not Resuscitate (DNR)

## 2024-11-11 NOTE — TELEPHONE ENCOUNTER
Care Transitions Initial Follow Up Call    Outreach made within 2 business days of discharge: Yes    Patient: Hanane Eastman Patient : 1950   MRN: [id number]  Reason for Admission: hypoxiemia  Discharge Date: 11/10/24       Spoke with: LVM for pt to call back !st attempt    Discharge department/facility: [facility]        Scheduled appointment with PCP within 7-14 days    Follow Up  Future Appointments   Date Time Provider Department Center   12/3/2024  9:00 AM Sarah Arguello, APRN - NP [facility] MAIN [facility] DEP       Jhoana Livingston LPN

## 2024-11-12 ENCOUNTER — APPOINTMENT (OUTPATIENT)
Facility: HOSPITAL | Age: 74
End: 2024-11-12
Payer: MEDICARE

## 2024-11-12 ENCOUNTER — HOSPITAL ENCOUNTER (EMERGENCY)
Facility: HOSPITAL | Age: 74
Discharge: HOME | End: 2024-11-12
Attending: EMERGENCY MEDICINE
Payer: MEDICARE

## 2024-11-12 VITALS
SYSTOLIC BLOOD PRESSURE: 161 MMHG | WEIGHT: 120 LBS | RESPIRATION RATE: 16 BRPM | HEART RATE: 92 BPM | DIASTOLIC BLOOD PRESSURE: 72 MMHG | TEMPERATURE: 98.1 F | OXYGEN SATURATION: 96 % | HEIGHT: 65 IN | BODY MASS INDEX: 19.99 KG/M2

## 2024-11-12 DIAGNOSIS — J96.21 ACUTE ON CHRONIC HYPOXIC RESPIRATORY FAILURE: Primary | ICD-10-CM

## 2024-11-12 LAB
ALBUMIN SERPL-MCNC: 3.5 G/DL (ref 3.5–5)
ALBUMIN/GLOB SERPL: 0.7 (ref 1.1–2.2)
ALP SERPL-CCNC: 109 U/L (ref 45–117)
ALT SERPL-CCNC: 31 U/L (ref 12–78)
ANION GAP SERPL CALC-SCNC: 10 MMOL/L (ref 2–12)
AST SERPL-CCNC: 22 U/L (ref 15–37)
BASE DEFICIT BLDV-SCNC: 1 MMOL/L
BASOPHILS # BLD: 0 K/UL (ref 0–0.1)
BASOPHILS NFR BLD: 0 % (ref 0–1)
BDY SITE: ABNORMAL
BILIRUB SERPL-MCNC: 0.2 MG/DL (ref 0.2–1)
BUN SERPL-MCNC: 10 MG/DL (ref 6–20)
BUN/CREAT SERPL: 18 (ref 12–20)
CALCIUM SERPL-MCNC: 9.7 MG/DL (ref 8.5–10.1)
CHLORIDE SERPL-SCNC: 92 MMOL/L (ref 97–108)
CO2 SERPL-SCNC: 31 MMOL/L (ref 21–32)
CREAT SERPL-MCNC: 0.57 MG/DL (ref 0.55–1.02)
DIFFERENTIAL METHOD BLD: ABNORMAL
EKG ATRIAL RATE: 93 BPM
EKG DIAGNOSIS: NORMAL
EKG P AXIS: 66 DEGREES
EKG P-R INTERVAL: 148 MS
EKG Q-T INTERVAL: 368 MS
EKG QRS DURATION: 86 MS
EKG QTC CALCULATION (BAZETT): 457 MS
EKG R AXIS: -27 DEGREES
EKG T AXIS: 39 DEGREES
EKG VENTRICULAR RATE: 93 BPM
EOSINOPHIL # BLD: 0 K/UL (ref 0–0.4)
EOSINOPHIL NFR BLD: 0 % (ref 0–7)
ERYTHROCYTE [DISTWIDTH] IN BLOOD BY AUTOMATED COUNT: 12 % (ref 11.5–14.5)
GLOBULIN SER CALC-MCNC: 4.7 G/DL (ref 2–4)
GLUCOSE SERPL-MCNC: 124 MG/DL (ref 65–100)
HCO3 BLDV-SCNC: 25 MMOL/L (ref 23–28)
HCT VFR BLD AUTO: 44.4 % (ref 35–47)
HGB BLD-MCNC: 15 G/DL (ref 11.5–16)
IMM GRANULOCYTES # BLD AUTO: 0 K/UL (ref 0–0.04)
IMM GRANULOCYTES NFR BLD AUTO: 0 % (ref 0–0.5)
LYMPHOCYTES # BLD: 1.8 K/UL (ref 0.8–3.5)
LYMPHOCYTES NFR BLD: 11 % (ref 12–49)
MCH RBC QN AUTO: 31.3 PG (ref 26–34)
MCHC RBC AUTO-ENTMCNC: 33.8 G/DL (ref 30–36.5)
MCV RBC AUTO: 92.5 FL (ref 80–99)
MONOCYTES # BLD: 0.3 K/UL (ref 0–1)
MONOCYTES NFR BLD: 2 % (ref 5–13)
NEUTS SEG # BLD: 14.4 K/UL (ref 1.8–8)
NEUTS SEG NFR BLD: 87 % (ref 32–75)
NRBC # BLD: 0 K/UL (ref 0–0.01)
NRBC BLD-RTO: 0 PER 100 WBC
NT PRO BNP: 140 PG/ML (ref 0–125)
PCO2 BLDV: 44.5 MMHG (ref 41–51)
PH BLDV: 7.36 (ref 7.32–7.42)
PLATELET # BLD AUTO: 433 K/UL (ref 150–400)
PMV BLD AUTO: 10.2 FL (ref 8.9–12.9)
PO2 BLDV: 65 MMHG (ref 25–40)
POTASSIUM SERPL-SCNC: 4.2 MMOL/L (ref 3.5–5.1)
PROT SERPL-MCNC: 8.2 G/DL (ref 6.4–8.2)
RBC # BLD AUTO: 4.8 M/UL (ref 3.8–5.2)
RBC MORPH BLD: ABNORMAL
SAO2 % BLDV: 92 % (ref 65–88)
SAO2% DEVICE SAO2% SENSOR NAME: ABNORMAL
SODIUM SERPL-SCNC: 133 MMOL/L (ref 136–145)
SPECIMEN SITE: ABNORMAL
TROPONIN I SERPL HS-MCNC: 5 NG/L (ref 0–51)
WBC # BLD AUTO: 16.5 K/UL (ref 3.6–11)

## 2024-11-12 PROCEDURE — 6360000002 HC RX W HCPCS: Performed by: EMERGENCY MEDICINE

## 2024-11-12 PROCEDURE — 71045 X-RAY EXAM CHEST 1 VIEW: CPT

## 2024-11-12 PROCEDURE — 80053 COMPREHEN METABOLIC PANEL: CPT

## 2024-11-12 PROCEDURE — 94640 AIRWAY INHALATION TREATMENT: CPT

## 2024-11-12 PROCEDURE — 85025 COMPLETE CBC W/AUTO DIFF WBC: CPT

## 2024-11-12 PROCEDURE — 36415 COLL VENOUS BLD VENIPUNCTURE: CPT

## 2024-11-12 PROCEDURE — 84484 ASSAY OF TROPONIN QUANT: CPT

## 2024-11-12 PROCEDURE — 83880 ASSAY OF NATRIURETIC PEPTIDE: CPT

## 2024-11-12 PROCEDURE — 93005 ELECTROCARDIOGRAM TRACING: CPT | Performed by: EMERGENCY MEDICINE

## 2024-11-12 PROCEDURE — 6370000000 HC RX 637 (ALT 250 FOR IP): Performed by: FAMILY MEDICINE

## 2024-11-12 PROCEDURE — 99285 EMERGENCY DEPT VISIT HI MDM: CPT

## 2024-11-12 PROCEDURE — 82803 BLOOD GASES ANY COMBINATION: CPT

## 2024-11-12 RX ORDER — NICOTINE 21 MG/24HR
1 PATCH, TRANSDERMAL 24 HOURS TRANSDERMAL DAILY
Status: DISCONTINUED | OUTPATIENT
Start: 2024-11-12 | End: 2024-11-12 | Stop reason: HOSPADM

## 2024-11-12 RX ORDER — ALBUTEROL SULFATE 0.83 MG/ML
2.5 SOLUTION RESPIRATORY (INHALATION)
Status: COMPLETED | OUTPATIENT
Start: 2024-11-12 | End: 2024-11-12

## 2024-11-12 RX ADMIN — ALBUTEROL SULFATE 2.5 MG: 2.5 SOLUTION RESPIRATORY (INHALATION) at 08:07

## 2024-11-12 ASSESSMENT — LIFESTYLE VARIABLES
HOW OFTEN DO YOU HAVE A DRINK CONTAINING ALCOHOL: NEVER
HOW MANY STANDARD DRINKS CONTAINING ALCOHOL DO YOU HAVE ON A TYPICAL DAY: PATIENT DOES NOT DRINK
HOW MANY STANDARD DRINKS CONTAINING ALCOHOL DO YOU HAVE ON A TYPICAL DAY: PATIENT DOES NOT DRINK
HOW OFTEN DO YOU HAVE A DRINK CONTAINING ALCOHOL: NEVER

## 2024-11-12 ASSESSMENT — 6 MINUTE WALK TEST (6MWT)
BORG DYSPNEA SCALE SCORE: VERY SLIGHT
BORG FATIGUE SCALE SCORE: VERY, VERY SLIGHT (JUST NOTICEABLE)
OXYGEN DEVICE: ROOM AIR

## 2024-11-12 ASSESSMENT — PAIN - FUNCTIONAL ASSESSMENT: PAIN_FUNCTIONAL_ASSESSMENT: NONE - DENIES PAIN

## 2024-11-12 NOTE — CARE COORDINATION
0759: Received a call from [facility] supervisor earlier this morning about patient needing home o2. This is usually a 1-2 day process. Unfortunately we do NOT have a DME closet like other larger hospitals.  Spoke with Manuel Ragsdale. She said it could be possible that it will be delivered today. Home o2 ordered through [facility]. Waiting to hear back.       0845: Spoke with patient and family about home o2. They asked if they could leave hospital without it. Told them no because she qualifies for home o2. Told them to listen out for a call from home o2 and likely they will hear back before I do regarding delivery. Also asked Ms. Eastman if she would like home health. She declined because she doesn't want to be homebound. She might look into [facility] PT herself.       1144: Spoke with Melyssa. She is bringing portable tanks to patient. She should be here around 2-2:30pm today. Told her about the partial road closure. She will bring portable tanks the concentrator will be delivered at a later time. Primary nurse made aware to let family know.     1430: Received message from Melyssa from Manuel again. ETA 35 minutes. Primary nurse aware.

## 2024-11-12 NOTE — DISCHARGE INSTRUCTIONS
--Albuterol nebs every 4 hours as needed   --Continue with nicotine patches and antibiotics as prescribed.  --Do not take more than 1 liter of oxygen.

## 2024-11-12 NOTE — PROGRESS NOTES
11/12/24 0501   Resting (Room Air)   SpO2 90   HR 94   Resting (On O2)   SpO2 92   O2 Device Nasal cannula   O2 Flow Rate (l/min) 1 l/min   During Walk (Room Air)   SpO2 84      Walk/Assistance Device Ambulation   Rate of Dyspnea 1   During Walk (On O2)   SpO2 91      O2 Device Nasal cannula   O2 Flow Rate (l/min) 1 l/min   Need Additional O2 Flow Rate Rows No   Walk/Assistance Device Ambulation   Rate of Dyspnea 1   After Walk   SpO2 92   HR 97   O2 Flow Rate (l/min) 1 l/min   Rate of Dyspnea 1   Does the Patient Qualify for Home O2 Yes   Liter Flow at Rest 1   Liter Flow on Exertion 1   Does the Patient Need Portable Oxygen Tanks Yes

## 2024-11-12 NOTE — ED NOTES
Updated family on dispo, per case management oxygen company will be on the way with the tanks to deliver at pt home around 2-3pm this afternoon

## 2024-11-12 NOTE — ED TRIAGE NOTES
Pt arrived via EMS from home with report of SOB. EMS states that they were called to the pt's home x 2. On their first visit pt reported SOB and her SpO2 was 88% RA. EMS placed pt on oxygen and her SpO2 increased to 92%.  called EMS again because pt had snoring respirations while sleeping. Pt denied SOB/CP/dizziness on arrival to ED. Pt just discharged  from [facility] for RLL Pneumonia.

## 2024-11-12 NOTE — CARE COORDINATION
11/12/24 1416   Avoidable Days   Community/External       Equipment (DME) availability or delivery     Waiting on home o2 delivery through [facility].

## 2024-11-12 NOTE — ED PROVIDER NOTES
Constitutional:       Comments: Very thin   HENT:      Nose: Nose normal. No congestion or rhinorrhea.      Mouth/Throat:      Mouth: Mucous membranes are moist.   Cardiovascular:      Rate and Rhythm: Normal rate and regular rhythm.   Pulmonary:      Effort: No tachypnea or respiratory distress.      Breath sounds: No stridor. Wheezing present. No rhonchi or rales.   Chest:      Chest wall: No tenderness or crepitus.   Abdominal:      Palpations: Abdomen is soft.      Tenderness: There is no abdominal tenderness.   Musculoskeletal:      Cervical back: Normal range of motion.      Right lower leg: No edema.      Left lower leg: No edema.   Skin:     General: Skin is warm.      Findings: No bruising.   Neurological:      Mental Status: She is alert and oriented to person, place, and time.   Psychiatric:         Mood and Affect: Mood normal.            DIAGNOSTIC RESULTS   LABS:     Recent Results (from the past 24 hour(s))   EKG 12 Lead (SOB)    Collection Time: 11/12/24  1:26 AM   Result Value Ref Range    Ventricular Rate 93 BPM    Atrial Rate 93 BPM    P-R Interval 148 ms    QRS Duration 86 ms    Q-T Interval 368 ms    QTc Calculation (Bazett) 457 ms    P Axis 66 degrees    R Axis -27 degrees    T Axis 39 degrees    Diagnosis       Normal sinus rhythm  Normal ECG  When compared with ECG of 06-NOV-2024 12:18,  No significant change was found     CBC with Auto Differential    Collection Time: 11/12/24  1:28 AM   Result Value Ref Range    WBC 16.5 (H) 3.6 - 11.0 K/uL    RBC 4.80 3.80 - 5.20 M/uL    Hemoglobin 15.0 11.5 - 16.0 g/dL    Hematocrit 44.4 35.0 - 47.0 %    MCV 92.5 80.0 - 99.0 FL    MCH 31.3 26.0 - 34.0 PG    MCHC 33.8 30.0 - 36.5 g/dL    RDW 12.0 11.5 - 14.5 %    Platelets 433 (H) 150 - 400 K/uL    MPV 10.2 8.9 - 12.9 FL    Nucleated RBCs 0.0 0  WBC    nRBC 0.00 0.00 - 0.01 K/uL    Neutrophils % 87 (H) 32 - 75 %    Lymphocytes % 11 (L) 12 - 49 %    Monocytes % 2 (L) 5 - 13 %    Eosinophils % 0 0 -

## 2024-11-12 NOTE — ED NOTES
Pt continues to take off vital sign monitoring, attempting to get dressed, wondering around room \"rearranging it\". Redirected and advised to remain in bed d/t fall risk and call for assistance. Pt verbalized understanding, call bell provided within reach

## 2024-11-12 NOTE — CONSULTS
[facility]   Hospitalist ED Consult        11/12/2024 2:08 PM  Patient: Hanane Eastman 1950  PCP: Sarah Arguello APRN - NP    HISTORY  Chief Complaint:   Chief Complaint   Patient presents with    Shortness of Breath       HPI: 74 y.o. female presenting for admission to [facility] for further evaluation and treatment for <principal problem not specified>.  She  has a past medical history of Anxiety, At risk for sleep apnea, Basal cell carcinoma of skin, Colon polyps, GERD (gastroesophageal reflux disease), Heart murmur, Hyperlipidemia LDL goal < 100, Hypertension, and Osteopenia.    Pt presents for assessment of episodes of acute dyspnea a/w drop in Home Oximeter to 84% on RA transiently.    Recent hospitalization for RLL pneumonia developing following outpatient cervical spine procedure.  Post Op became sedate a/w marked elevation in pCO2 while in recovery on oxygen. CXR then on 10/28 was clear.  Pt admitted [facility] on 11/6 after returning home for tx RLL pneumonia and respiratory failure.  She has apparent undiagnosed COPD with long h/o cigarette abuse.  Has appt with Dr. Velazquez planned on 11/22.  She was d/c 11/9 on Augmentin, Prednisone taper, Duonebs.      Last evening she was having upper airway noise during sleep that alarmed her .  No apnea noted.  Subsequent felt more dyspnic and RA Oximetry dropped transiently to 84%, with usual reading in 88-90 range.  Prior to discharge the oximetry was stable on RA, walking in agrawal with Oximetry of 92% on RA.    Pt admits to resuming cigarettes use on returning home.  She is taking Augmentin / Pred as Rx and Duonebs tid.      Past Medical History:  Past Medical History:   Diagnosis Date    Anxiety     At risk for sleep apnea 10/15/2024    JUANPABLO 4    Basal cell carcinoma of skin     Colon polyps     GERD (gastroesophageal reflux disease)     Heart murmur     Dr. Back    Hyperlipidemia LDL goal < 100     Hypertension     Osteopenia

## 2024-11-18 ENCOUNTER — OFFICE VISIT (OUTPATIENT)
Age: 74
End: 2024-11-18

## 2024-11-18 DIAGNOSIS — R73.09 ELEVATED GLUCOSE: ICD-10-CM

## 2024-11-18 DIAGNOSIS — D72.829 LEUKOCYTOSIS, UNSPECIFIED TYPE: ICD-10-CM
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
## 2024-11-18 DIAGNOSIS — J96.01 ACUTE RESPIRATORY FAILURE WITH HYPOXEMIA: ICD-10-CM

## 2024-11-18 DIAGNOSIS — R53.1 GENERALIZED WEAKNESS: ICD-10-CM

## 2024-11-18 DIAGNOSIS — J18.9 NOSOCOMIAL PNEUMONIA: Primary | ICD-10-CM

## 2024-11-18 ASSESSMENT — PATIENT HEALTH QUESTIONNAIRE - PHQ9
2. FEELING DOWN, DEPRESSED OR HOPELESS: SEVERAL DAYS
SUM OF ALL RESPONSES TO PHQ9 QUESTIONS 1 & 2: 2
SUM OF ALL RESPONSES TO PHQ QUESTIONS 1-9: 2
1. LITTLE INTEREST OR PLEASURE IN DOING THINGS: SEVERAL DAYS
SUM OF ALL RESPONSES TO PHQ QUESTIONS 1-9: 2

## 2024-11-18 NOTE — PROGRESS NOTES
Labs drawn in left arm per Sarah's orders. Pt tolerated well.\"Have you been to the ER, urgent care clinic since your last visit?  Hospitalized since your last visit?\"    YES - When: approximately 1  weeks ago.  Where and Why: hospital.    “Have you seen or consulted any other health care providers outside our system since your last visit?”    YES - When: approximately 1 months ago.  Where and Why: neuro.      “Have you had a colorectal cancer screening such as a colonoscopy/FIT/Cologuard?    NO will get in jan    Date of last Colonoscopy: 11/5/2019  No cologuard on file  No FIT/FOBT on file   No flexible sigmoidoscopy on file          
  Musculoskeletal:         General: Normal range of motion.   Skin:     Comments: She has a bandage in place to the left side of her neck that is CD & I   Neurological:      Mental Status: She is alert and oriented to person, place, and time.   Psychiatric:         Behavior: Behavior normal.               Verbal and written instructions (see AVS) provided.  Patient expresses understanding of diagnosis and treatment plan.      Return in about 3 weeks (around 12/9/2024) for check up.        Patient has been advised to contact practice or seek care if condition persists or worsens.     JACKSON Mccauley - NP    Please note that this dictation was completed with computer voice recognition software. Quite often unanticipated grammatical, syntax, homophones, and other interpretive errors are inadvertently transcribed by the computer software. Please disregard and excuse any errors that have escaped final proofreading.

## 2024-11-19 VITALS
TEMPERATURE: 98.4 F | HEIGHT: 65 IN | DIASTOLIC BLOOD PRESSURE: 62 MMHG | BODY MASS INDEX: 18.16 KG/M2 | SYSTOLIC BLOOD PRESSURE: 109 MMHG | OXYGEN SATURATION: 92 % | RESPIRATION RATE: 18 BRPM | WEIGHT: 109 LBS

## 2024-11-19 LAB
ALBUMIN SERPL-MCNC: 3.3 G/DL (ref 3.5–5)
ALBUMIN/GLOB SERPL: 0.9 (ref 1.1–2.2)
ALP SERPL-CCNC: 90 U/L (ref 45–117)
ALT SERPL-CCNC: 24 U/L (ref 12–78)
ANION GAP SERPL CALC-SCNC: 7 MMOL/L (ref 2–12)
AST SERPL-CCNC: 11 U/L (ref 15–37)
BASOPHILS # BLD: 0.1 K/UL (ref 0–0.1)
BASOPHILS NFR BLD: 0 % (ref 0–1)
BILIRUB SERPL-MCNC: 0.4 MG/DL (ref 0.2–1)
BUN SERPL-MCNC: 12 MG/DL (ref 6–20)
BUN/CREAT SERPL: 24 (ref 12–20)
CALCIUM SERPL-MCNC: 9.4 MG/DL (ref 8.5–10.1)
CHLORIDE SERPL-SCNC: 97 MMOL/L (ref 97–108)
CO2 SERPL-SCNC: 33 MMOL/L (ref 21–32)
CREAT SERPL-MCNC: 0.49 MG/DL (ref 0.55–1.02)
DIFFERENTIAL METHOD BLD: ABNORMAL
EOSINOPHIL # BLD: 0 K/UL (ref 0–0.4)
EOSINOPHIL NFR BLD: 0 % (ref 0–7)
ERYTHROCYTE [DISTWIDTH] IN BLOOD BY AUTOMATED COUNT: 12.5 % (ref 11.5–14.5)
GLOBULIN SER CALC-MCNC: 3.5 G/DL (ref 2–4)
GLUCOSE SERPL-MCNC: 143 MG/DL (ref 65–100)
HCT VFR BLD AUTO: 41.4 % (ref 35–47)
HGB BLD-MCNC: 13.5 G/DL (ref 11.5–16)
IMM GRANULOCYTES # BLD AUTO: 0.1 K/UL (ref 0–0.04)
IMM GRANULOCYTES NFR BLD AUTO: 1 % (ref 0–0.5)
LYMPHOCYTES # BLD: 2.2 K/UL (ref 0.8–3.5)
LYMPHOCYTES NFR BLD: 13 % (ref 12–49)
MCH RBC QN AUTO: 30.6 PG (ref 26–34)
MCHC RBC AUTO-ENTMCNC: 32.6 G/DL (ref 30–36.5)
MCV RBC AUTO: 93.9 FL (ref 80–99)
MONOCYTES # BLD: 0.6 K/UL (ref 0–1)
MONOCYTES NFR BLD: 3 % (ref 5–13)
NEUTS SEG # BLD: 13.8 K/UL (ref 1.8–8)
NEUTS SEG NFR BLD: 83 % (ref 32–75)
NRBC # BLD: 0 K/UL (ref 0–0.01)
NRBC BLD-RTO: 0 PER 100 WBC
PLATELET # BLD AUTO: 410 K/UL (ref 150–400)
PMV BLD AUTO: 9.9 FL (ref 8.9–12.9)
POTASSIUM SERPL-SCNC: 3.9 MMOL/L (ref 3.5–5.1)
PROT SERPL-MCNC: 6.8 G/DL (ref 6.4–8.2)
RBC # BLD AUTO: 4.41 M/UL (ref 3.8–5.2)
SODIUM SERPL-SCNC: 137 MMOL/L (ref 136–145)
WBC # BLD AUTO: 16.6 K/UL (ref 3.6–11)

## 2024-11-19 ASSESSMENT — ENCOUNTER SYMPTOMS
SHORTNESS OF BREATH: 1
COUGH: 1
ABDOMINAL DISTENTION: 0
EYE DISCHARGE: 0
CHEST TIGHTNESS: 0
EYE ITCHING: 0

## 2024-11-21 ENCOUNTER — CLINICAL DOCUMENTATION (OUTPATIENT)
Age: 74
End: 2024-11-21

## 2024-11-25 ENCOUNTER — LAB (OUTPATIENT)
Age: 74
End: 2024-11-25
Payer: MEDICARE

## 2024-11-25 DIAGNOSIS — R73.09 ELEVATED GLUCOSE: ICD-10-CM

## 2024-11-25 DIAGNOSIS — D72.829 LEUKOCYTOSIS, UNSPECIFIED TYPE: ICD-10-CM

## 2024-11-25 PROCEDURE — 36415 COLL VENOUS BLD VENIPUNCTURE: CPT | Performed by: NURSE PRACTITIONER

## 2024-11-26 ENCOUNTER — APPOINTMENT (OUTPATIENT)
Facility: HOSPITAL | Age: 74
End: 2024-11-26
Payer: MEDICARE

## 2024-11-26 ENCOUNTER — HOSPITAL ENCOUNTER (EMERGENCY)
Facility: HOSPITAL | Age: 74
Discharge: ANOTHER HOSPITAL | End: 2024-11-26
Attending: EMERGENCY MEDICINE | Admitting: EMERGENCY MEDICINE
Payer: MEDICARE

## 2024-11-26 VITALS
SYSTOLIC BLOOD PRESSURE: 127 MMHG | TEMPERATURE: 99.1 F | OXYGEN SATURATION: 91 % | WEIGHT: 115 LBS | HEIGHT: 65 IN | BODY MASS INDEX: 19.16 KG/M2 | HEART RATE: 87 BPM | RESPIRATION RATE: 24 BRPM | DIASTOLIC BLOOD PRESSURE: 58 MMHG

## 2024-11-26 DIAGNOSIS — J96.21 ACUTE ON CHRONIC HYPOXIC RESPIRATORY FAILURE: ICD-10-CM

## 2024-11-26 DIAGNOSIS — S22.41XA CLOSED FRACTURE OF MULTIPLE RIBS OF RIGHT SIDE, INITIAL ENCOUNTER: Primary | ICD-10-CM

## 2024-11-26 LAB
ALBUMIN SERPL-MCNC: 3.1 G/DL (ref 3.5–5)
ALBUMIN/GLOB SERPL: 0.8 (ref 1.1–2.2)
ALP SERPL-CCNC: 98 U/L (ref 45–117)
ALT SERPL-CCNC: 18 U/L (ref 12–78)
ANION GAP SERPL CALC-SCNC: 4 MMOL/L (ref 2–12)
AST SERPL-CCNC: 21 U/L (ref 15–37)
BASOPHILS # BLD: 0.1 K/UL (ref 0–0.1)
BASOPHILS # BLD: 0.1 K/UL (ref 0–0.1)
BASOPHILS NFR BLD: 0 % (ref 0–1)
BASOPHILS NFR BLD: 0 % (ref 0–1)
BILIRUB SERPL-MCNC: 0.4 MG/DL (ref 0.2–1)
BUN SERPL-MCNC: 5 MG/DL (ref 6–20)
BUN/CREAT SERPL: 15 (ref 12–20)
CALCIUM SERPL-MCNC: 9.4 MG/DL (ref 8.5–10.1)
CHLORIDE SERPL-SCNC: 90 MMOL/L (ref 97–108)
CO2 SERPL-SCNC: 37 MMOL/L (ref 21–32)
CREAT SERPL-MCNC: 0.34 MG/DL (ref 0.55–1.02)
DIFFERENTIAL METHOD BLD: ABNORMAL
DIFFERENTIAL METHOD BLD: ABNORMAL
EOSINOPHIL # BLD: 0 K/UL (ref 0–0.4)
EOSINOPHIL # BLD: 0.1 K/UL (ref 0–0.4)
EOSINOPHIL NFR BLD: 0 % (ref 0–7)
EOSINOPHIL NFR BLD: 0 % (ref 0–7)
ERYTHROCYTE [DISTWIDTH] IN BLOOD BY AUTOMATED COUNT: 11.8 % (ref 11.5–14.5)
ERYTHROCYTE [DISTWIDTH] IN BLOOD BY AUTOMATED COUNT: 12 % (ref 11.5–14.5)
EST. AVERAGE GLUCOSE BLD GHB EST-MCNC: 114 MG/DL
GLOBULIN SER CALC-MCNC: 4 G/DL (ref 2–4)
GLUCOSE SERPL-MCNC: 101 MG/DL (ref 65–100)
HBA1C MFR BLD: 5.6 % (ref 4–5.6)
HCT VFR BLD AUTO: 38.7 % (ref 35–47)
HCT VFR BLD AUTO: 40.2 % (ref 35–47)
HGB BLD-MCNC: 12.6 G/DL (ref 11.5–16)
HGB BLD-MCNC: 13.6 G/DL (ref 11.5–16)
IMM GRANULOCYTES # BLD AUTO: 0.1 K/UL (ref 0–0.04)
IMM GRANULOCYTES # BLD AUTO: 0.1 K/UL (ref 0–0.04)
IMM GRANULOCYTES NFR BLD AUTO: 0 % (ref 0–0.5)
IMM GRANULOCYTES NFR BLD AUTO: 1 % (ref 0–0.5)
LYMPHOCYTES # BLD: 2.4 K/UL (ref 0.8–3.5)
LYMPHOCYTES # BLD: 2.8 K/UL (ref 0.8–3.5)
LYMPHOCYTES NFR BLD: 17 % (ref 12–49)
LYMPHOCYTES NFR BLD: 18 % (ref 12–49)
MCH RBC QN AUTO: 30.2 PG (ref 26–34)
MCH RBC QN AUTO: 30.4 PG (ref 26–34)
MCHC RBC AUTO-ENTMCNC: 32.6 G/DL (ref 30–36.5)
MCHC RBC AUTO-ENTMCNC: 33.8 G/DL (ref 30–36.5)
MCV RBC AUTO: 89.3 FL (ref 80–99)
MCV RBC AUTO: 93.5 FL (ref 80–99)
MONOCYTES # BLD: 0.9 K/UL (ref 0–1)
MONOCYTES # BLD: 1.3 K/UL (ref 0–1)
MONOCYTES NFR BLD: 7 % (ref 5–13)
MONOCYTES NFR BLD: 8 % (ref 5–13)
NEUTS SEG # BLD: 10.3 K/UL (ref 1.8–8)
NEUTS SEG # BLD: 11.3 K/UL (ref 1.8–8)
NEUTS SEG NFR BLD: 74 % (ref 32–75)
NEUTS SEG NFR BLD: 75 % (ref 32–75)
NRBC # BLD: 0 K/UL (ref 0–0.01)
NRBC # BLD: 0 K/UL (ref 0–0.01)
NRBC BLD-RTO: 0 PER 100 WBC
NRBC BLD-RTO: 0 PER 100 WBC
PLATELET # BLD AUTO: 243 K/UL (ref 150–400)
PLATELET # BLD AUTO: 267 K/UL (ref 150–400)
PMV BLD AUTO: 10.6 FL (ref 8.9–12.9)
PMV BLD AUTO: 9.3 FL (ref 8.9–12.9)
POTASSIUM SERPL-SCNC: 3.5 MMOL/L (ref 3.5–5.1)
PROT SERPL-MCNC: 7.1 G/DL (ref 6.4–8.2)
RBC # BLD AUTO: 4.14 M/UL (ref 3.8–5.2)
RBC # BLD AUTO: 4.5 M/UL (ref 3.8–5.2)
SODIUM SERPL-SCNC: 131 MMOL/L (ref 136–145)
WBC # BLD AUTO: 13.7 K/UL (ref 3.6–11)
WBC # BLD AUTO: 15.6 K/UL (ref 3.6–11)

## 2024-11-26 PROCEDURE — 94640 AIRWAY INHALATION TREATMENT: CPT

## 2024-11-26 PROCEDURE — 6360000002 HC RX W HCPCS: Performed by: EMERGENCY MEDICINE

## 2024-11-26 PROCEDURE — 6370000000 HC RX 637 (ALT 250 FOR IP): Performed by: EMERGENCY MEDICINE

## 2024-11-26 PROCEDURE — 96374 THER/PROPH/DIAG INJ IV PUSH: CPT

## 2024-11-26 PROCEDURE — 36415 COLL VENOUS BLD VENIPUNCTURE: CPT

## 2024-11-26 PROCEDURE — 71250 CT THORAX DX C-: CPT

## 2024-11-26 PROCEDURE — 99285 EMERGENCY DEPT VISIT HI MDM: CPT

## 2024-11-26 PROCEDURE — 85025 COMPLETE CBC W/AUTO DIFF WBC: CPT

## 2024-11-26 PROCEDURE — 80053 COMPREHEN METABOLIC PANEL: CPT

## 2024-11-26 PROCEDURE — 71101 X-RAY EXAM UNILAT RIBS/CHEST: CPT

## 2024-11-26 RX ORDER — FENTANYL CITRATE 50 UG/ML
25 INJECTION, SOLUTION INTRAMUSCULAR; INTRAVENOUS
Status: COMPLETED | OUTPATIENT
Start: 2024-11-26 | End: 2024-11-26

## 2024-11-26 RX ORDER — IPRATROPIUM BROMIDE AND ALBUTEROL SULFATE 2.5; .5 MG/3ML; MG/3ML
1 SOLUTION RESPIRATORY (INHALATION)
Status: COMPLETED | OUTPATIENT
Start: 2024-11-26 | End: 2024-11-26

## 2024-11-26 RX ORDER — OXYCODONE HYDROCHLORIDE 5 MG/1
5 TABLET ORAL
Status: COMPLETED | OUTPATIENT
Start: 2024-11-26 | End: 2024-11-26

## 2024-11-26 RX ORDER — FENTANYL CITRATE 50 UG/ML
50 INJECTION, SOLUTION INTRAMUSCULAR; INTRAVENOUS ONCE
Status: DISCONTINUED | OUTPATIENT
Start: 2024-11-26 | End: 2024-11-26

## 2024-11-26 RX ADMIN — IPRATROPIUM BROMIDE AND ALBUTEROL SULFATE 1 DOSE: .5; 2.5 SOLUTION RESPIRATORY (INHALATION) at 06:16

## 2024-11-26 RX ADMIN — FENTANYL CITRATE 25 MCG: 50 INJECTION INTRAMUSCULAR; INTRAVENOUS at 06:33

## 2024-11-26 RX ADMIN — OXYCODONE 5 MG: 5 TABLET ORAL at 10:24

## 2024-11-26 ASSESSMENT — PAIN SCALES - GENERAL
PAINLEVEL_OUTOF10: 7
PAINLEVEL_OUTOF10: 7

## 2024-11-26 ASSESSMENT — PAIN DESCRIPTION - ORIENTATION
ORIENTATION: RIGHT
ORIENTATION: RIGHT

## 2024-11-26 ASSESSMENT — PAIN DESCRIPTION - LOCATION
LOCATION: RIB CAGE
LOCATION: RIB CAGE

## 2024-11-26 ASSESSMENT — PAIN - FUNCTIONAL ASSESSMENT: PAIN_FUNCTIONAL_ASSESSMENT: 0-10

## 2024-11-26 NOTE — ED TRIAGE NOTES
Patient came in with c/o R sided rib pain from a fall that happened tonight. Patient stated that she was going to bed when she tripped and hit her ribs on a dresser. No LOC.

## 2024-11-26 NOTE — PROGRESS NOTES
arranged ALS  transportation from [facility] ED 1 to  [facility] .  Arranged ALS transport w/[facility] -  advised to bring appropiate equipment - ETA of  1030  given - updated ED staff w/ETA

## 2024-11-26 NOTE — ED PROVIDER NOTES
tablet by mouth every 8 hours as needed for Anxiety    IPRATROPIUM 0.5 MG-ALBUTEROL 2.5 MG (DUONEB) 0.5-2.5 (3) MG/3ML SOLN NEBULIZER SOLUTION    Inhale 3 mLs into the lungs 4 times daily    ISOSORBIDE DINITRATE (ISORDIL) 5 MG TABLET    Take 1 tablet by mouth in the morning and at bedtime    MAGNESIUM OXIDE (MAG-OX) 400 (240 MG) MG TABLET    Take 1 tablet by mouth daily    NEBULIZERS (COMPRESSOR/NEBULIZER) MISC    1 each by Does not apply route 4 times daily    NICOTINE (NICODERM CQ) 21 MG/24HR    Place 1 patch onto the skin daily    OMEGA-3 FATTY ACIDS (FISH OIL) 360 MG CAPS    Take 360 mg by mouth daily    PANTOPRAZOLE (PROTONIX) 40 MG TABLET    Take 1 tablet by mouth in the morning and at bedtime    ROSUVASTATIN (CRESTOR) 20 MG TABLET    TAKE 1 TABLET BY MOUTH EVERY DAY AT NIGHT    TRAZODONE (DESYREL) 50 MG TABLET    Take 1 tablet by mouth nightly    TRIAMCINOLONE (KENALOG) 0.1 % CREAM    Apply topically 2 times daily.    VITAMIN D (VITAMIN D-3) 25 MCG (1000 UT) CAPS    Take 1 capsule by mouth daily       SCREENINGS               No data recorded        PHYSICAL EXAM      ED Triage Vitals [11/26/24 6122]   Encounter Vitals Group      BP (!) 176/72      Systolic BP Percentile       Diastolic BP Percentile       Pulse 80      Respirations 18      Temp 99.1 °F (37.3 °C)      Temp Source Oral      SpO2 (!) 88 %      Weight - Scale 52.2 kg (115 lb)      Height 1.651 m (5' 5\")      Head Circumference       Peak Flow       Pain Score       Pain Loc       Pain Education       Exclude from Growth Chart               Physical Exam  Vitals and nursing note reviewed.   Constitutional:       Comments: Thin   HENT:      Mouth/Throat:      Mouth: Mucous membranes are moist.   Cardiovascular:      Rate and Rhythm: Normal rate and regular rhythm.      Pulses: Normal pulses.      Heart sounds: Normal heart sounds. No murmur heard.  Pulmonary:      Effort: Pulmonary effort is normal. No respiratory distress.      Breath sounds: No

## 2024-12-06 ENCOUNTER — APPOINTMENT (OUTPATIENT)
Facility: HOSPITAL | Age: 74
End: 2024-12-06
Payer: MEDICARE

## 2024-12-06 ENCOUNTER — HOSPITAL ENCOUNTER (EMERGENCY)
Facility: HOSPITAL | Age: 74
Discharge: HOME | End: 2024-12-06
Attending: EMERGENCY MEDICINE
Payer: MEDICARE

## 2024-12-06 VITALS
BODY MASS INDEX: 18.33 KG/M2 | WEIGHT: 110 LBS | OXYGEN SATURATION: 93 % | DIASTOLIC BLOOD PRESSURE: 70 MMHG | HEART RATE: 83 BPM | TEMPERATURE: 98.1 F | SYSTOLIC BLOOD PRESSURE: 119 MMHG | HEIGHT: 65 IN | RESPIRATION RATE: 20 BRPM

## 2024-12-06 DIAGNOSIS — S27.0XXA TRAUMATIC PNEUMOTHORAX, INITIAL ENCOUNTER: ICD-10-CM

## 2024-12-06 DIAGNOSIS — T50.905A MEDICATION SIDE EFFECT, INITIAL ENCOUNTER: ICD-10-CM

## 2024-12-06 DIAGNOSIS — R40.4 TRANSIENT ALTERATION OF AWARENESS: Primary | ICD-10-CM

## 2024-12-06 LAB
ALBUMIN SERPL-MCNC: 2.7 G/DL (ref 3.5–5)
ALBUMIN/GLOB SERPL: 0.8 (ref 1.1–2.2)
ALP SERPL-CCNC: 106 U/L (ref 45–117)
ALT SERPL-CCNC: 31 U/L (ref 12–78)
AMPHET UR QL SCN: NEGATIVE
ANION GAP SERPL CALC-SCNC: 4 MMOL/L (ref 2–12)
APAP SERPL-MCNC: <2 UG/ML (ref 10–30)
APPEARANCE UR: CLEAR
ARTERIAL PATENCY WRIST A: POSITIVE
AST SERPL-CCNC: 12 U/L (ref 15–37)
BACTERIA URNS QL MICRO: NEGATIVE /HPF
BARBITURATES UR QL SCN: NEGATIVE
BASE EXCESS BLD CALC-SCNC: 10.8 MMOL/L
BASOPHILS # BLD: 0.1 K/UL (ref 0–0.1)
BASOPHILS NFR BLD: 1 % (ref 0–1)
BDY SITE: ABNORMAL
BENZODIAZ UR QL: NEGATIVE
BILIRUB SERPL-MCNC: 0.3 MG/DL (ref 0.2–1)
BILIRUB UR QL: NEGATIVE
BUN SERPL-MCNC: 9 MG/DL (ref 6–20)
BUN/CREAT SERPL: 33 (ref 12–20)
CALCIUM SERPL-MCNC: 8.6 MG/DL (ref 8.5–10.1)
CANNABINOIDS UR QL SCN: NEGATIVE
CHLORIDE SERPL-SCNC: 100 MMOL/L (ref 97–108)
CK SERPL-CCNC: 25 U/L (ref 26–192)
CO2 SERPL-SCNC: 36 MMOL/L (ref 21–32)
COCAINE UR QL SCN: NEGATIVE
COLOR UR: NORMAL
CREAT SERPL-MCNC: 0.27 MG/DL (ref 0.55–1.02)
DIFFERENTIAL METHOD BLD: ABNORMAL
EKG ATRIAL RATE: 95 BPM
EKG DIAGNOSIS: NORMAL
EKG P AXIS: 89 DEGREES
EKG P-R INTERVAL: 144 MS
EKG Q-T INTERVAL: 368 MS
EKG QRS DURATION: 82 MS
EKG QTC CALCULATION (BAZETT): 462 MS
EKG R AXIS: -1 DEGREES
EKG T AXIS: 54 DEGREES
EKG VENTRICULAR RATE: 95 BPM
EOSINOPHIL # BLD: 0.5 K/UL (ref 0–0.4)
EOSINOPHIL NFR BLD: 5 % (ref 0–7)
EPITH CASTS URNS QL MICRO: NORMAL /LPF
ERYTHROCYTE [DISTWIDTH] IN BLOOD BY AUTOMATED COUNT: 11.9 % (ref 11.5–14.5)
ETHANOL SERPL-MCNC: <10 MG/DL (ref 0–0.08)
GAS FLOW.O2 O2 DELIVERY SYS: ABNORMAL
GLOBULIN SER CALC-MCNC: 3.6 G/DL (ref 2–4)
GLUCOSE BLD STRIP.AUTO-MCNC: 102 MG/DL (ref 65–117)
GLUCOSE SERPL-MCNC: 102 MG/DL (ref 65–100)
GLUCOSE UR STRIP.AUTO-MCNC: NEGATIVE MG/DL
HCO3 BLD-SCNC: 37.8 MMOL/L (ref 21–28)
HCT VFR BLD AUTO: 37.2 % (ref 35–47)
HGB BLD-MCNC: 12 G/DL (ref 11.5–16)
HGB UR QL STRIP: NEGATIVE
HYALINE CASTS URNS QL MICRO: NORMAL /LPF (ref 0–2)
IMM GRANULOCYTES # BLD AUTO: 0 K/UL (ref 0–0.04)
IMM GRANULOCYTES NFR BLD AUTO: 0 % (ref 0–0.5)
KETONES UR QL STRIP.AUTO: NEGATIVE MG/DL
LEUKOCYTE ESTERASE UR QL STRIP.AUTO: NEGATIVE
LYMPHOCYTES # BLD: 3.4 K/UL (ref 0.8–3.5)
LYMPHOCYTES NFR BLD: 33 % (ref 12–49)
Lab: NORMAL
MCH RBC QN AUTO: 30.4 PG (ref 26–34)
MCHC RBC AUTO-ENTMCNC: 32.3 G/DL (ref 30–36.5)
MCV RBC AUTO: 94.2 FL (ref 80–99)
METHADONE UR QL: NEGATIVE
MONOCYTES # BLD: 0.7 K/UL (ref 0–1)
MONOCYTES NFR BLD: 7 % (ref 5–13)
NEUTS SEG # BLD: 5.6 K/UL (ref 1.8–8)
NEUTS SEG NFR BLD: 54 % (ref 32–75)
NITRITE UR QL STRIP.AUTO: NEGATIVE
NRBC # BLD: 0 K/UL (ref 0–0.01)
NRBC BLD-RTO: 0 PER 100 WBC
O2/TOTAL GAS SETTING VFR VENT: 21 %
OPIATES UR QL: NEGATIVE
PCO2 BLD: 59.7 MMHG (ref 35–48)
PCP UR QL: NEGATIVE
PH BLD: 7.41 (ref 7.35–7.45)
PH UR STRIP: 7.5 (ref 5–8)
PLATELET # BLD AUTO: 364 K/UL (ref 150–400)
PMV BLD AUTO: 8.8 FL (ref 8.9–12.9)
PO2 BLD: 51 MMHG (ref 83–108)
POTASSIUM SERPL-SCNC: 3.7 MMOL/L (ref 3.5–5.1)
PROT SERPL-MCNC: 6.3 G/DL (ref 6.4–8.2)
PROT UR STRIP-MCNC: NEGATIVE MG/DL
RBC # BLD AUTO: 3.95 M/UL (ref 3.8–5.2)
RBC #/AREA URNS HPF: NORMAL /HPF (ref 0–5)
SALICYLATES SERPL-MCNC: <1.7 MG/DL (ref 2.8–20)
SAO2 % BLD: 84.6 % (ref 92–97)
SERVICE CMNT-IMP: NORMAL
SODIUM SERPL-SCNC: 140 MMOL/L (ref 136–145)
SP GR UR REFRACTOMETRY: 1.01
SPECIMEN TYPE: ABNORMAL
URINE CULTURE IF INDICATED: NORMAL
UROBILINOGEN UR QL STRIP.AUTO: 1 EU/DL (ref 0.2–1)
WBC # BLD AUTO: 10.4 K/UL (ref 3.6–11)
WBC URNS QL MICRO: NORMAL /HPF (ref 0–4)

## 2024-12-06 PROCEDURE — 71045 X-RAY EXAM CHEST 1 VIEW: CPT

## 2024-12-06 PROCEDURE — 80179 DRUG ASSAY SALICYLATE: CPT

## 2024-12-06 PROCEDURE — 36415 COLL VENOUS BLD VENIPUNCTURE: CPT

## 2024-12-06 PROCEDURE — 80143 DRUG ASSAY ACETAMINOPHEN: CPT

## 2024-12-06 PROCEDURE — 99285 EMERGENCY DEPT VISIT HI MDM: CPT

## 2024-12-06 PROCEDURE — 82550 ASSAY OF CK (CPK): CPT

## 2024-12-06 PROCEDURE — 80307 DRUG TEST PRSMV CHEM ANLYZR: CPT

## 2024-12-06 PROCEDURE — 82803 BLOOD GASES ANY COMBINATION: CPT

## 2024-12-06 PROCEDURE — 36600 WITHDRAWAL OF ARTERIAL BLOOD: CPT

## 2024-12-06 PROCEDURE — 82962 GLUCOSE BLOOD TEST: CPT

## 2024-12-06 PROCEDURE — 93005 ELECTROCARDIOGRAM TRACING: CPT | Performed by: EMERGENCY MEDICINE

## 2024-12-06 PROCEDURE — 85025 COMPLETE CBC W/AUTO DIFF WBC: CPT

## 2024-12-06 PROCEDURE — 82077 ASSAY SPEC XCP UR&BREATH IA: CPT

## 2024-12-06 PROCEDURE — 81001 URINALYSIS AUTO W/SCOPE: CPT

## 2024-12-06 PROCEDURE — 71250 CT THORAX DX C-: CPT

## 2024-12-06 PROCEDURE — 80053 COMPREHEN METABOLIC PANEL: CPT

## 2024-12-06 ASSESSMENT — ENCOUNTER SYMPTOMS
DIARRHEA: 0
ABDOMINAL PAIN: 0
VOMITING: 0
NAUSEA: 0
SHORTNESS OF BREATH: 0

## 2024-12-06 NOTE — ED NOTES
Bedside shift change report given to Monae RN (oncoming nurse) by Lulú RN (offgoing nurse). Report included the following information Nurse Handoff Report, Index, ED Encounter Summary, ED SBAR, Adult Overview, Surgery Report, Intake/Output, MAR, Recent Results, and Cardiac Rhythm SINUS .

## 2024-12-06 NOTE — ED NOTES
Discharge paperwork reviewed and provided to pt. Time was given to ask any questions or concerns which there were none att.

## 2024-12-06 NOTE — ED PROVIDER NOTES
EMERGENCY DEPARTMENT HISTORY AND PHYSICAL EXAM     ----------------------------------------------------------------------------  Please note that this dictation was completed with [employer], the computer voice recognition software.  Quite often unanticipated grammatical, syntax, homophones, and other interpretive errors are inadvertently transcribed by the computer software.  Please disregard these errors.  Please excuse any errors that have escaped final proofreading  ----------------------------------------------------------------------------      Date: 12/6/2024  Patient Name: Hanane Eastman      HISTORY OF PRESENT ILLNESS     Chief Complaint   Patient presents with    Altered Mental Status     Pt arrives from home via EMS for AMS per daughter. Pt recently d/c for rib fractures and prescribed narcotics for the pain. When EMS arrived, pt was GCS 13 w/ pinpoint pupils. Narcan given en-route w/ improvement to pt's mental status. Per family, she tokes trazodone for sleep and has a hx of AMS when her CO2 levels increase. Pt A&Ox4 at this time         History obtainted from:  Patient    Other independent source of history: Family, EMS    HPI: Hanane Eastman is a 74 y.o. female, with significant pmhx of hypertension, cholesterol, reflux, anxiety, recent rib fractures due to fall, who presents via EMS to the ED with c/o transient altered mental status.  Patient's family notes that she had gotten up to go the bathroom in 1 night and nearly fell to the floor but was assisted by her .  This to the patient was difficult to arouse at that point in 911 was called.  EMS arrived and noted GCS of 13 with pinpoint pupils at which time they gave her Narcan.  Patient has had complete resolution of her previous altered mental status by the time of her arrival to the emergency department.  Patient's family notes that she is back to her baseline.  Has not had similar episode in the past.  Has been taking trazodone for sleep after      Education level    COMPLEXITY     Amount and/or Complexity of Data Reviewed  HIGH complexity decision making performed   Presentation: ACUTE and SEVERE (giving consideration to thing such as systemic symptoms, impact on quality of life, morbidity and mortality).  Clinical lab tests: ordered as appropriate, reviewed and interpreted by me   Radiology studies: (if indicated) ordered as appropriate & images reviewed and interpreted by me  Reviewed and summarized all available results: yes  Review and summarize available past medical records: yes  Independent visualization of images, ECGs, or specimens (if performed): yes  Independent history obtained from other sources such as family, EMS, bystanders, law enforcement (if available): yes    FINAL IMPRESSION     1. Transient alteration of awareness    2. Traumatic pneumothorax, initial encounter    3. Medication side effect, initial encounter         DISPOSITION/PLAN      Discharge to home     Hanane Eastman's  results have been reviewed with her.  She has been counseled regarding her diagnosis, treatment, and plan.   I have discussed with the patient and/or caregiver my initial clinical impression which is based on an evidence-based clinical evaluation of the patient and interpretation of available results. Involved patient and/or caregiver in management, treatment options and final disposition. Patient/caregiver verbalize understanding of and agreement. We agree that at this time additional imaging or blood work is not emergently needed. She verbally conveys understanding and agreement of the signs, symptoms, diagnosis, treatment and prognosis and additionally agrees to follow up as discussed.  She also agrees with the care-plan.  I believe that all of her questions have been answered.  I have also provided discharge instructions for her that include: educational information regarding their diagnosis and treatment, and list of reasons why they would want to return to

## 2024-12-06 NOTE — ED NOTES
Bedside shift change report given to ARMANDO Licona (oncoming nurse) by ARMANDO Chino (offgoing nurse). Report included the following information Nurse Handoff Report, ED Encounter Summary, ED SBAR, and MAR.      at bedside.

## 2024-12-06 NOTE — DISCHARGE INSTRUCTIONS
It was a pleasure taking care of you in our Emergency Department today.  We know that when you come to [facility], you are entrusting us with your health, comfort, and safety.  Our physicians and nurses honor that trust, and truly appreciate the opportunity to care for you and your loved ones.      We also value your feedback.  If you receive a survey about your Emergency Department experience today, please fill it out.  We care about our patients' feedback, and we listen to what you have to say.  Thank you!      Dr. Mara Sears MD.

## 2024-12-06 NOTE — ED NOTES
Pt son-in-law at bedside. Pt alert, conversational, and in no acute distress. Pt taken off 1.5L NC and is 95% on RA. Pt bed wheels locked, bed in lowest position, side rails in upright position x2, pt on the monitor x3, and call bell within reach.

## 2025-01-01 ENCOUNTER — HOSPITAL ENCOUNTER (EMERGENCY)
Facility: HOSPITAL | Age: 75
End: 2025-01-22
Attending: EMERGENCY MEDICINE
Payer: MEDICARE

## 2025-01-01 ENCOUNTER — APPOINTMENT (OUTPATIENT)
Facility: HOSPITAL | Age: 75
End: 2025-01-01
Payer: MEDICARE

## 2025-01-01 VITALS
SYSTOLIC BLOOD PRESSURE: 98 MMHG | HEART RATE: 55 BPM | HEIGHT: 66 IN | BODY MASS INDEX: 15.27 KG/M2 | WEIGHT: 95 LBS | DIASTOLIC BLOOD PRESSURE: 41 MMHG | OXYGEN SATURATION: 97 %

## 2025-01-01 DIAGNOSIS — J96.02 ACUTE RESPIRATORY FAILURE WITH HYPOXIA AND HYPERCARBIA: Primary | ICD-10-CM

## 2025-01-01 DIAGNOSIS — I46.9 CARDIAC ARREST: ICD-10-CM

## 2025-01-01 DIAGNOSIS — J96.01 ACUTE RESPIRATORY FAILURE WITH HYPOXIA AND HYPERCARBIA: Primary | ICD-10-CM

## 2025-01-01 LAB
ALBUMIN SERPL-MCNC: 2.7 G/DL (ref 3.5–5)
ALBUMIN/GLOB SERPL: 0.9 (ref 1.1–2.2)
ALP SERPL-CCNC: 77 U/L (ref 45–117)
ALT SERPL-CCNC: 25 U/L (ref 12–78)
ANION GAP BLD CALC-SCNC: ABNORMAL (ref 10–20)
ANION GAP SERPL CALC-SCNC: 4 MMOL/L (ref 2–12)
AST SERPL-CCNC: 18 U/L (ref 15–37)
BASOPHILS # BLD: 0.11 K/UL (ref 0–0.1)
BASOPHILS NFR BLD: 0.3 % (ref 0–1)
BILIRUB SERPL-MCNC: 0.3 MG/DL (ref 0.2–1)
BUN SERPL-MCNC: 12 MG/DL (ref 6–20)
BUN/CREAT SERPL: 27 (ref 12–20)
CA-I BLD-MCNC: 1.25 MMOL/L (ref 1.15–1.33)
CALCIUM SERPL-MCNC: 8.3 MG/DL (ref 8.5–10.1)
CHLORIDE BLD-SCNC: 100 MMOL/L (ref 100–111)
CHLORIDE SERPL-SCNC: 101 MMOL/L (ref 97–108)
CO2 SERPL-SCNC: 41 MMOL/L (ref 21–32)
CREAT SERPL-MCNC: 0.44 MG/DL (ref 0.55–1.02)
CREAT UR-MCNC: 0.4 MG/DL (ref 0.6–1.3)
DIFFERENTIAL METHOD BLD: ABNORMAL
EOSINOPHIL # BLD: 0 K/UL (ref 0–0.4)
EOSINOPHIL NFR BLD: 0 % (ref 0–7)
ERYTHROCYTE [DISTWIDTH] IN BLOOD BY AUTOMATED COUNT: 14.3 % (ref 11.5–14.5)
GLOBULIN SER CALC-MCNC: 2.9 G/DL (ref 2–4)
GLUCOSE BLD STRIP.AUTO-MCNC: 159 MG/DL (ref 74–99)
GLUCOSE SERPL-MCNC: 142 MG/DL (ref 65–100)
HCT VFR BLD AUTO: 39.7 % (ref 35–47)
HGB BLD-MCNC: 12 G/DL (ref 11.5–16)
IMM GRANULOCYTES # BLD AUTO: 0.46 K/UL (ref 0–0.04)
IMM GRANULOCYTES NFR BLD AUTO: 1.3 % (ref 0–0.5)
LACTATE BLD-SCNC: 3.83 MMOL/L (ref 0.4–2)
LYMPHOCYTES # BLD: 2.31 K/UL (ref 0.8–3.5)
LYMPHOCYTES NFR BLD: 6.6 % (ref 12–49)
MCH RBC QN AUTO: 30.8 PG (ref 26–34)
MCHC RBC AUTO-ENTMCNC: 30.2 G/DL (ref 30–36.5)
MCV RBC AUTO: 102.1 FL (ref 80–99)
MONOCYTES # BLD: 2.35 K/UL (ref 0–1)
MONOCYTES NFR BLD: 6.7 % (ref 5–13)
NEUTS SEG # BLD: 29.77 K/UL (ref 1.8–8)
NEUTS SEG NFR BLD: 85.1 % (ref 32–75)
NRBC # BLD: 0.03 K/UL (ref 0–0.01)
NRBC BLD-RTO: 0.1 PER 100 WBC
PCO2 BLDV: >110 MMHG (ref 41–51)
PH BLDV: 6.97 (ref 7.32–7.42)
PLATELET # BLD AUTO: 307 K/UL (ref 150–400)
PMV BLD AUTO: 9 FL (ref 8.9–12.9)
PO2 BLDV: <27 MMHG (ref 25–40)
POTASSIUM BLD-SCNC: 4.3 MMOL/L (ref 3.5–5.5)
POTASSIUM SERPL-SCNC: 4 MMOL/L (ref 3.5–5.1)
PROT SERPL-MCNC: 5.6 G/DL (ref 6.4–8.2)
RBC # BLD AUTO: 3.89 M/UL (ref 3.8–5.2)
RBC MORPH BLD: ABNORMAL
SERVICE CMNT-IMP: ABNORMAL
SODIUM BLD-SCNC: 149 MMOL/L (ref 136–145)
SODIUM SERPL-SCNC: 146 MMOL/L (ref 136–145)
SPECIMEN SITE: ABNORMAL
WBC # BLD AUTO: 35 K/UL (ref 3.6–11)
WBC MORPH BLD: ABNORMAL

## 2025-01-01 PROCEDURE — 82947 ASSAY GLUCOSE BLOOD QUANT: CPT

## 2025-01-01 PROCEDURE — 2500000003 HC RX 250 WO HCPCS: Performed by: EMERGENCY MEDICINE

## 2025-01-01 PROCEDURE — 92950 HEART/LUNG RESUSCITATION CPR: CPT

## 2025-01-01 PROCEDURE — 31500 INSERT EMERGENCY AIRWAY: CPT

## 2025-01-01 PROCEDURE — 96374 THER/PROPH/DIAG INJ IV PUSH: CPT

## 2025-01-01 PROCEDURE — 84132 ASSAY OF SERUM POTASSIUM: CPT

## 2025-01-01 PROCEDURE — 82803 BLOOD GASES ANY COMBINATION: CPT

## 2025-01-01 PROCEDURE — 84295 ASSAY OF SERUM SODIUM: CPT

## 2025-01-01 PROCEDURE — 36415 COLL VENOUS BLD VENIPUNCTURE: CPT

## 2025-01-01 PROCEDURE — 99285 EMERGENCY DEPT VISIT HI MDM: CPT

## 2025-01-01 PROCEDURE — 82330 ASSAY OF CALCIUM: CPT

## 2025-01-01 PROCEDURE — 71045 X-RAY EXAM CHEST 1 VIEW: CPT

## 2025-01-01 PROCEDURE — 96375 TX/PRO/DX INJ NEW DRUG ADDON: CPT

## 2025-01-01 PROCEDURE — 85025 COMPLETE CBC W/AUTO DIFF WBC: CPT

## 2025-01-01 PROCEDURE — 80053 COMPREHEN METABOLIC PANEL: CPT

## 2025-01-01 PROCEDURE — 6360000002 HC RX W HCPCS: Performed by: EMERGENCY MEDICINE

## 2025-01-01 RX ORDER — 0.9 % SODIUM CHLORIDE 0.9 %
1000 INTRAVENOUS SOLUTION INTRAVENOUS ONCE
Status: DISCONTINUED | OUTPATIENT
Start: 2025-01-01 | End: 2025-01-01 | Stop reason: HOSPADM

## 2025-01-01 RX ORDER — ATROPINE SULFATE 0.1 MG/ML
INJECTION INTRAVENOUS DAILY PRN
Status: COMPLETED | OUTPATIENT
Start: 2025-01-01 | End: 2025-01-01

## 2025-01-01 RX ORDER — CALCIUM CHLORIDE 100 MG/ML
INJECTION INTRAVENOUS; INTRAVENTRICULAR DAILY PRN
Status: COMPLETED | OUTPATIENT
Start: 2025-01-01 | End: 2025-01-01

## 2025-01-01 RX ORDER — EPINEPHRINE IN SOD CHLOR,ISO 1 MG/10 ML
SYRINGE (ML) INTRAVENOUS DAILY PRN
Status: COMPLETED | OUTPATIENT
Start: 2025-01-01 | End: 2025-01-01

## 2025-01-01 RX ORDER — ROCURONIUM BROMIDE 50 MG/5 ML
80 SYRINGE (ML) INTRAVENOUS ONCE
Status: COMPLETED | OUTPATIENT
Start: 2025-01-01 | End: 2025-01-01

## 2025-01-01 RX ORDER — ETOMIDATE 2 MG/ML
20 INJECTION INTRAVENOUS
Status: COMPLETED | OUTPATIENT
Start: 2025-01-01 | End: 2025-01-01

## 2025-01-01 RX ADMIN — EPINEPHRINE 1 MG: 0.1 INJECTION, SOLUTION ENDOTRACHEAL; INTRACARDIAC; INTRAVENOUS at 08:30

## 2025-01-01 RX ADMIN — ETOMIDATE 20 MG: 2 INJECTION, SOLUTION INTRAVENOUS at 08:23

## 2025-01-01 RX ADMIN — EPINEPHRINE 1 MG: 0.1 INJECTION, SOLUTION ENDOTRACHEAL; INTRACARDIAC; INTRAVENOUS at 08:35

## 2025-01-01 RX ADMIN — EPINEPHRINE 1 MG: 0.1 INJECTION, SOLUTION ENDOTRACHEAL; INTRACARDIAC; INTRAVENOUS at 08:52

## 2025-01-01 RX ADMIN — ATROPINE SULFATE 0.5 MG: 0.1 INJECTION, SOLUTION INTRAVENOUS at 08:57

## 2025-01-01 RX ADMIN — SODIUM BICARBONATE 50 MEQ: 84 INJECTION, SOLUTION INTRAVENOUS at 08:32

## 2025-01-01 RX ADMIN — EPINEPHRINE 1 MG: 0.1 INJECTION, SOLUTION ENDOTRACHEAL; INTRACARDIAC; INTRAVENOUS at 08:33

## 2025-01-01 RX ADMIN — Medication 80 MG: at 08:23

## 2025-01-01 RX ADMIN — CALCIUM CHLORIDE 1000 MG: 100 INJECTION, SOLUTION INTRAVENOUS at 08:53

## 2025-01-01 RX ADMIN — EPINEPHRINE 1 MG: 0.1 INJECTION, SOLUTION ENDOTRACHEAL; INTRACARDIAC; INTRAVENOUS at 08:50

## 2025-01-01 ASSESSMENT — LIFESTYLE VARIABLES
HOW OFTEN DO YOU HAVE A DRINK CONTAINING ALCOHOL: PATIENT UNABLE TO ANSWER
HOW MANY STANDARD DRINKS CONTAINING ALCOHOL DO YOU HAVE ON A TYPICAL DAY: PATIENT UNABLE TO ANSWER

## 2025-01-01 ASSESSMENT — PULMONARY FUNCTION TESTS: PIF_VALUE: 23

## 2025-01-02 DIAGNOSIS — E78.2 MIXED HYPERLIPIDEMIA: ICD-10-CM

## 2025-01-02 RX ORDER — ROSUVASTATIN CALCIUM 20 MG/1
20 TABLET, COATED ORAL NIGHTLY
Qty: 90 TABLET | Refills: 1 | Status: SHIPPED | OUTPATIENT
Start: 2025-01-02

## 2025-01-13 RX ORDER — CLOTRIMAZOLE 10 MG/1
10 LOZENGE ORAL
Qty: 50 TABLET | Refills: 0 | Status: SHIPPED | OUTPATIENT
Start: 2025-01-13 | End: 2025-01-23

## 2025-01-14 RX ORDER — ESCITALOPRAM OXALATE 20 MG/1
20 TABLET ORAL DAILY
Qty: 90 TABLET | Refills: 1 | Status: SHIPPED | OUTPATIENT
Start: 2025-01-14

## 2025-01-22 NOTE — ED NOTES
Escorted family to waiting room. Therapeutic presence provided. Patient's  will call back with  home once decided. Post mortem care provided.

## 2025-01-22 NOTE — ED NOTES
Intensivist MD Rios at bedside. MD Rios spoke with family, states to transition to DNR, no more ACLS. Family coming back. Femoral pulse still present but faint.

## 2025-01-22 NOTE — ED PROVIDER NOTES
[facility] EMERGENCY DEPARTMENT  EMERGENCY DEPARTMENT ENCOUNTER    Patient Name: Charo Gan  MRN: [id number]  YOB: 1950  Provider: Yovani Kinsey MD  PCP: No primary care provider on file.  Time/Date of evaluation: 8:18 AM EST on 1/22/25    History of Presenting Illness     Chief Complaint   Patient presents with    Respiratory Distress     History Provided by: Patient   History is limited by: Nothing    HISTORY (Narrative):   Charo Gan is a 74 y.o. female with a PMHX of obstructive sleep apnea, anxiety, hyperlipidemia, hypertension, and GERD  who presents to the emergency department (room 11) by EMS C/O altered mental status and respiratory distress.  Patient is unable to provide any history.  EMS reports that she had a tumor removed yesterday as an outpatient and has been less responsive than normal.  EMS states she had agonal respirations on their arrival.    Nursing Notes were all reviewed and agreed with or any disagreements were addressed in the HPI.    Past History     PAST MEDICAL HISTORY:  No past medical history on file.    PAST SURGICAL HISTORY:  No past surgical history on file.    FAMILY HISTORY:  No family history on file.    SOCIAL HISTORY:       MEDICATIONS:  No current facility-administered medications on file prior to encounter.     No current outpatient medications on file prior to encounter.       ALLERGIES:  No Known Allergies    SOCIAL DETERMINANTS OF HEALTH:  Social Determinants of Health     Tobacco Use: Not on file   Alcohol Use: Patient Unable To Answer (1/22/2025)    AUDIT-C     Frequency of Alcohol Consumption: Patient unable to answer     Average Number of Drinks: Patient unable to answer     Frequency of Binge Drinking: Patient unable to answer   Financial Resource Strain: Not on file   Food Insecurity: Not on file   Transportation Needs: Not on file   Physical Activity: Not on file   Stress: Not on file   Social Connections: Not on file   Intimate

## 2025-01-22 NOTE — ACP (ADVANCE CARE PLANNING)
I was asked to come see this woman after she had undergone CPR/ACLS for approx 20 minutes to consider admission to ICU under the [facility]. I briefly reviewed her medical history and performed a cursory exam. While I was evaluating her, she again suffered PEA cardiac arrest and chest compressions were resumed with the Sav device. She received 2 rounds of compressions and epinephrine under the guidance of Dr Kinsey, the ER MD. At that point, it was evident that the likelihood of a favorable outcome was nil. I spoke with the patient's  and daughter and conveyed my concern. They did not wish for her to undergo any further CPR. When I returned to the patient's room, there was a pulse and perfusing rhythm. I conveyed to the daughter and  that it was highly likely that she would again arrest and confirmed that we were not going to undertake any further ACLS efforts. I shared the results of these conversations with the ED staf and Dr Kinsey.    Armaan Rios MD  [facility]  [facility] 4th floor [facility] phone: [phone]  [facility] 7th floor [facility] phone: [phone]  [facility] phone: [phone]  1/22/2025

## 2025-01-22 NOTE — ED NOTES
Shortly after arrival and intubation, patient lost peripheral and femoral pulses. CPR was immediately started and ACLS meds given. Patient got pulse back. ACLS performed two more times. Dr. Rios spoke with family and family decided no more ACLS. Patient had a strong bradycardic pulse which slowed down and stopped at 0910 AM (confirmed via palpating femoral pulse and auscultating heart sounds). Ventilator stopped. Patient was surrounded by family, this RN & pastoral care. Patient was peaceful and not indicating pain. Patient was extubated per family request at 0940AM (MD approved). Family remains at bedside and do not have a  home at this time.

## 2025-01-22 NOTE — PROGRESS NOTES
Spiritual Health History and Assessment/Progress Note  [facility]    Crisis (Cardiac arrest),  , Adjustment to illness,      Name: Charo Gan MRN: [id number]    Age: 74 y.o.     Sex: female   Language: English   Christian: Jew   <principal problem not specified>     Date: 1/22/2025            Total Time Calculated: 25 min              Spiritual Assessment began in [facility] EMERGENCY DEPARTMENT        Referral/Consult From: Nurse   Encounter Overview/Reason: Crisis (Cardiac arrest)  Service Provided For: Family    Delia, Belief, Meaning:   Patient has beliefs or practices that help with coping during difficult times  Family/Friends have beliefs or practices that help with coping during difficult times      Importance and Influence:  Patient has spiritual/personal beliefs that influence decisions regarding their health  Family/Friends have spiritual/personal beliefs that influence decisions regarding the patient's health    Community:  Patient feels well-supported. Support system includes: Spouse/Partner, Children, [place] Community, and Extended family  Family/Friends feel well-supported. Support system includes: Parent/s, [place], and Extended family    Assessment and Plan of Care:     Patient Interventions include: Other:    Family/Friends Interventions include: Facilitated expression of thoughts and feelings, Explored spiritual coping/struggle/distress, Affirmed coping skills/support systems, and Facilitated life review and/or legacy    Patient Plan of Care: No future visits per patient/family request  Family/Friends Plan of Care: No future visits per patient/family request    Electronically signed by JOSELINE Saha on 1/22/2025 at 10:14 AM

## 2025-01-22 NOTE — ED TRIAGE NOTES
TRIAGE: Patient arrives by EMS from home. Patient had a tumor removed yesterday. Patient arrives unresponsive. Arrives with an NPA and being bagged via BVM. Patient's eyes are open but not responsive. . GCS 6.  On EMS arrival, patient was agonal. RA 75%. BVM 98%

## 2025-01-31 ENCOUNTER — TELEPHONE (OUTPATIENT)
Age: 75
End: 2025-01-31

## 2025-02-02 LAB
EKG ATRIAL RATE: 39 BPM
EKG DIAGNOSIS: NORMAL
EKG P AXIS: 27 DEGREES
EKG P-R INTERVAL: 276 MS
EKG Q-T INTERVAL: 394 MS
EKG QRS DURATION: 90 MS
EKG QTC CALCULATION (BAZETT): 317 MS
EKG R AXIS: 69 DEGREES
EKG T AXIS: 39 DEGREES
EKG VENTRICULAR RATE: 39 BPM

## (undated) DEVICE — GLOVE ORANGE PI 7 1/2   MSG9075

## (undated) DEVICE — CUFF BLD PRSS AD CLTH SGL TB W/ BAYNT CONN ROUNDED CORNER

## (undated) DEVICE — SYSTEM REPROC CBL 3 LD DISPOSABLE

## (undated) DEVICE — IV START KIT: Brand: MEDLINE

## (undated) DEVICE — TUBING, SUCTION, 1/4" X 10', STRAIGHT: Brand: MEDLINE

## (undated) DEVICE — SOLUTION SURG PREP 26 CC PURPREP

## (undated) DEVICE — MARKER,SKIN,WI/RULER AND LABELS: Brand: MEDLINE

## (undated) DEVICE — SOLUTION IRRIG 1000ML 0.9% SOD CHL USP POUR PLAS BTL

## (undated) DEVICE — BITEBLOCK 54FR W/ DENT RIM BLOX

## (undated) DEVICE — TOOL MR8-14MH30 MR8 14CM MATCH 3MM: Brand: MIDAS REX MR8

## (undated) DEVICE — 450 ML BOTTLE OF 0.05% CHLORHEXIDINE GLUCONATE IN 99.95% STERILE WATER FOR IRRIGATION, USP AND APPLICATOR.: Brand: IRRISEPT ANTIMICROBIAL WOUND LAVAGE

## (undated) DEVICE — SUTURE MONOCRYL SZ 4-0 L27IN ABSRB UD L19MM PS-2 1/2 CIR PRIM Y426H

## (undated) DEVICE — ADHESIVE SKIN CLOSURE WND 8.661X1.5 IN 22 CM LIQUIBAND SECUR

## (undated) DEVICE — SNAP KOVER: Brand: UNBRANDED

## (undated) DEVICE — CATHETER IV 22GA L1IN OD0.8382-0.9144MM ID0.6096-0.6858MM 382523

## (undated) DEVICE — FLOSEAL HEMOSTATIC MATRIX, 5 ML: Brand: FLOSEAL

## (undated) DEVICE — SUTURE VICRYL + SZ 30 L18IN ABSRB UD CP2 L26MM 1/2 CIR REV

## (undated) DEVICE — LIQUIBAND RAPID ADHESIVE 36/CS 0.8ML: Brand: MEDLINE

## (undated) DEVICE — LAMINECTOMY-MRMC: Brand: MEDLINE INDUSTRIES, INC.

## (undated) DEVICE — SPONGE,PEANUT,XRAY,ST,SM,3/8",5/CARD: Brand: MEDLINE INDUSTRIES, INC.

## (undated) DEVICE — MAGNETIC INSTR DRAPE 20X16: Brand: MEDLINE INDUSTRIES, INC.

## (undated) DEVICE — CASPAR DISTR PIN12MMSTER: Brand: AESCULAP

## (undated) DEVICE — SOLUTION IRRIG 1000ML STRL H2O USP PLAS POUR BTL

## (undated) DEVICE — SHEET,T,THYROID,STERILE: Brand: MEDLINE

## (undated) DEVICE — SET GRAV CK VLV NEEDLESS ST 3 GANGED 4WAY STPCOCK HI FLO 10